# Patient Record
Sex: FEMALE | Race: WHITE | NOT HISPANIC OR LATINO | Employment: FULL TIME | URBAN - METROPOLITAN AREA
[De-identification: names, ages, dates, MRNs, and addresses within clinical notes are randomized per-mention and may not be internally consistent; named-entity substitution may affect disease eponyms.]

---

## 2018-05-03 ENCOUNTER — OFFICE VISIT - HEALTHEAST (OUTPATIENT)
Dept: FAMILY MEDICINE | Facility: CLINIC | Age: 38
End: 2018-05-03

## 2018-05-03 ENCOUNTER — COMMUNICATION - HEALTHEAST (OUTPATIENT)
Dept: TELEHEALTH | Facility: CLINIC | Age: 38
End: 2018-05-03

## 2018-05-03 DIAGNOSIS — R30.0 DYSURIA: ICD-10-CM

## 2018-05-03 DIAGNOSIS — R35.0 INCREASED URINARY FREQUENCY: ICD-10-CM

## 2018-05-03 DIAGNOSIS — N94.10 FEMALE DYSPAREUNIA: ICD-10-CM

## 2018-05-03 DIAGNOSIS — F52.31 FEMALE ORGASMIC DISORDER: ICD-10-CM

## 2018-05-03 ASSESSMENT — MIFFLIN-ST. JEOR: SCORE: 1321.32

## 2018-05-04 ENCOUNTER — COMMUNICATION - HEALTHEAST (OUTPATIENT)
Dept: FAMILY MEDICINE | Facility: CLINIC | Age: 38
End: 2018-05-04

## 2018-05-09 ENCOUNTER — COMMUNICATION - HEALTHEAST (OUTPATIENT)
Dept: FAMILY MEDICINE | Facility: CLINIC | Age: 38
End: 2018-05-09

## 2018-05-15 ENCOUNTER — COMMUNICATION - HEALTHEAST (OUTPATIENT)
Dept: FAMILY MEDICINE | Facility: CLINIC | Age: 38
End: 2018-05-15

## 2018-05-17 ENCOUNTER — COMMUNICATION - HEALTHEAST (OUTPATIENT)
Dept: FAMILY MEDICINE | Facility: CLINIC | Age: 38
End: 2018-05-17

## 2018-06-04 ENCOUNTER — RECORDS - HEALTHEAST (OUTPATIENT)
Dept: ADMINISTRATIVE | Facility: OTHER | Age: 38
End: 2018-06-04

## 2018-06-08 ENCOUNTER — COMMUNICATION - HEALTHEAST (OUTPATIENT)
Dept: FAMILY MEDICINE | Facility: CLINIC | Age: 38
End: 2018-06-08

## 2018-07-13 ENCOUNTER — RECORDS - HEALTHEAST (OUTPATIENT)
Dept: ADMINISTRATIVE | Facility: OTHER | Age: 38
End: 2018-07-13

## 2018-08-29 ENCOUNTER — RECORDS - HEALTHEAST (OUTPATIENT)
Dept: ADMINISTRATIVE | Facility: OTHER | Age: 38
End: 2018-08-29

## 2018-09-05 ENCOUNTER — OFFICE VISIT - HEALTHEAST (OUTPATIENT)
Dept: FAMILY MEDICINE | Facility: CLINIC | Age: 38
End: 2018-09-05

## 2018-09-05 DIAGNOSIS — Z12.4 SCREENING FOR CERVICAL CANCER: ICD-10-CM

## 2018-09-05 DIAGNOSIS — N88.9 CERVIX ABNORMALITY: ICD-10-CM

## 2018-09-05 LAB
CLUE CELLS: NORMAL
TRICHOMONAS, WET PREP: NORMAL
YEAST, WET PREP: NORMAL

## 2018-09-05 ASSESSMENT — MIFFLIN-ST. JEOR: SCORE: 1330.39

## 2018-09-06 LAB
HPV SOURCE: NORMAL
HUMAN PAPILLOMA VIRUS 16 DNA: NEGATIVE
HUMAN PAPILLOMA VIRUS 18 DNA: NEGATIVE
HUMAN PAPILLOMA VIRUS FINAL DIAGNOSIS: NORMAL
HUMAN PAPILLOMA VIRUS OTHER HR: NEGATIVE
SPECIMEN DESCRIPTION: NORMAL

## 2018-09-10 ENCOUNTER — COMMUNICATION - HEALTHEAST (OUTPATIENT)
Dept: FAMILY MEDICINE | Facility: CLINIC | Age: 38
End: 2018-09-10

## 2018-09-12 ENCOUNTER — COMMUNICATION - HEALTHEAST (OUTPATIENT)
Dept: FAMILY MEDICINE | Facility: CLINIC | Age: 38
End: 2018-09-12

## 2018-09-12 ENCOUNTER — COMMUNICATION - HEALTHEAST (OUTPATIENT)
Dept: HEALTH INFORMATION MANAGEMENT | Facility: CLINIC | Age: 38
End: 2018-09-12

## 2018-09-12 ENCOUNTER — COMMUNICATION - HEALTHEAST (OUTPATIENT)
Dept: TELEHEALTH | Facility: CLINIC | Age: 38
End: 2018-09-12

## 2018-09-12 LAB
BKR LAB AP ABNORMAL BLEEDING: NO
BKR LAB AP BIRTH CONTROL/HORMONES: NORMAL
BKR LAB AP CERVICAL APPEARANCE: NORMAL
BKR LAB AP GYN ADEQUACY: NORMAL
BKR LAB AP GYN INTERPRETATION: NORMAL
BKR LAB AP HPV REFLEX: NORMAL
BKR LAB AP LMP: NORMAL
BKR LAB AP PATIENT STATUS: NORMAL
BKR LAB AP PREVIOUS ABNORMAL: NORMAL
BKR LAB AP PREVIOUS NORMAL: 2011
HIGH RISK?: NO
PATH REPORT.COMMENTS IMP SPEC: NORMAL
RESULT FLAG (HE HISTORICAL CONVERSION): NORMAL

## 2018-10-26 ENCOUNTER — OFFICE VISIT - HEALTHEAST (OUTPATIENT)
Dept: FAMILY MEDICINE | Facility: CLINIC | Age: 38
End: 2018-10-26

## 2018-10-26 DIAGNOSIS — M54.50 ACUTE BILATERAL LOW BACK PAIN WITHOUT SCIATICA: ICD-10-CM

## 2018-10-26 DIAGNOSIS — M53.3 SI (SACROILIAC) JOINT DYSFUNCTION: ICD-10-CM

## 2018-10-26 ASSESSMENT — MIFFLIN-ST. JEOR: SCORE: 1325.86

## 2019-02-11 ENCOUNTER — OFFICE VISIT - HEALTHEAST (OUTPATIENT)
Dept: FAMILY MEDICINE | Facility: CLINIC | Age: 39
End: 2019-02-11

## 2019-02-11 DIAGNOSIS — R53.83 LOW ENERGY: ICD-10-CM

## 2019-02-11 DIAGNOSIS — K21.9 GASTROESOPHAGEAL REFLUX DISEASE, ESOPHAGITIS PRESENCE NOT SPECIFIED: ICD-10-CM

## 2019-02-11 ASSESSMENT — MIFFLIN-ST. JEOR: SCORE: 1188.43

## 2019-03-04 ENCOUNTER — OFFICE VISIT - HEALTHEAST (OUTPATIENT)
Dept: FAMILY MEDICINE | Facility: CLINIC | Age: 39
End: 2019-03-04

## 2019-03-04 DIAGNOSIS — R31.0 GROSS HEMATURIA: ICD-10-CM

## 2019-03-04 DIAGNOSIS — Z11.3 ROUTINE SCREENING FOR STI (SEXUALLY TRANSMITTED INFECTION): ICD-10-CM

## 2019-03-04 DIAGNOSIS — Z00.00 HEALTHCARE MAINTENANCE: ICD-10-CM

## 2019-03-04 DIAGNOSIS — F32.81 PMDD (PREMENSTRUAL DYSPHORIC DISORDER): ICD-10-CM

## 2019-03-04 LAB
ALBUMIN SERPL-MCNC: 3.9 G/DL (ref 3.5–5)
ALBUMIN UR-MCNC: NEGATIVE MG/DL
ALP SERPL-CCNC: 37 U/L (ref 45–120)
ALT SERPL W P-5'-P-CCNC: 18 U/L (ref 0–45)
ANION GAP SERPL CALCULATED.3IONS-SCNC: 7 MMOL/L (ref 5–18)
APPEARANCE UR: CLEAR
AST SERPL W P-5'-P-CCNC: 19 U/L (ref 0–40)
BILIRUB SERPL-MCNC: 0.5 MG/DL (ref 0–1)
BILIRUB UR QL STRIP: NEGATIVE
BUN SERPL-MCNC: 8 MG/DL (ref 8–22)
CALCIUM SERPL-MCNC: 9.2 MG/DL (ref 8.5–10.5)
CHLORIDE BLD-SCNC: 104 MMOL/L (ref 98–107)
CHOLEST SERPL-MCNC: 138 MG/DL
CO2 SERPL-SCNC: 27 MMOL/L (ref 22–31)
COLOR UR AUTO: YELLOW
CREAT SERPL-MCNC: 0.77 MG/DL (ref 0.6–1.1)
FASTING STATUS PATIENT QL REPORTED: NO
GFR SERPL CREATININE-BSD FRML MDRD: >60 ML/MIN/1.73M2
GLUCOSE BLD-MCNC: 88 MG/DL (ref 70–125)
GLUCOSE UR STRIP-MCNC: NEGATIVE MG/DL
HDLC SERPL-MCNC: 51 MG/DL
HGB UR QL STRIP: NEGATIVE
HIV 1+2 AB+HIV1 P24 AG SERPL QL IA: NEGATIVE
KETONES UR STRIP-MCNC: NEGATIVE MG/DL
LDLC SERPL CALC-MCNC: 76 MG/DL
LEUKOCYTE ESTERASE UR QL STRIP: NEGATIVE
NITRATE UR QL: NEGATIVE
PH UR STRIP: 7 [PH] (ref 5–8)
POTASSIUM BLD-SCNC: 3.8 MMOL/L (ref 3.5–5)
PROT SERPL-MCNC: 6 G/DL (ref 6–8)
SODIUM SERPL-SCNC: 138 MMOL/L (ref 136–145)
SP GR UR STRIP: 1.01 (ref 1–1.03)
TRIGL SERPL-MCNC: 54 MG/DL
UROBILINOGEN UR STRIP-ACNC: NORMAL

## 2019-03-04 ASSESSMENT — MIFFLIN-ST. JEOR: SCORE: 1193.19

## 2019-03-05 LAB
C TRACH DNA SPEC QL PROBE+SIG AMP: NEGATIVE
N GONORRHOEA DNA SPEC QL NAA+PROBE: NEGATIVE
T PALLIDUM AB SER QL: NEGATIVE

## 2019-03-06 LAB
HAV IGM SERPL QL IA: NEGATIVE
HBV CORE IGM SERPL QL IA: NEGATIVE
HBV SURFACE AG SERPL QL IA: NEGATIVE
HCV AB SERPL QL IA: NEGATIVE

## 2019-05-31 ENCOUNTER — OFFICE VISIT - HEALTHEAST (OUTPATIENT)
Dept: FAMILY MEDICINE | Facility: CLINIC | Age: 39
End: 2019-05-31

## 2019-05-31 DIAGNOSIS — B07.8 OTHER VIRAL WARTS: ICD-10-CM

## 2019-05-31 DIAGNOSIS — F32.81 PMDD (PREMENSTRUAL DYSPHORIC DISORDER): ICD-10-CM

## 2019-05-31 DIAGNOSIS — K12.0 CANKER SORE: ICD-10-CM

## 2019-05-31 DIAGNOSIS — T78.40XA ALLERGIC STATE, INITIAL ENCOUNTER: ICD-10-CM

## 2019-05-31 ASSESSMENT — MIFFLIN-ST. JEOR: SCORE: 1142.04

## 2019-06-18 ENCOUNTER — OFFICE VISIT - HEALTHEAST (OUTPATIENT)
Dept: FAMILY MEDICINE | Facility: CLINIC | Age: 39
End: 2019-06-18

## 2019-06-18 DIAGNOSIS — S46.911A MUSCLE STRAIN OF RIGHT UPPER ARM, INITIAL ENCOUNTER: ICD-10-CM

## 2019-06-18 ASSESSMENT — MIFFLIN-ST. JEOR: SCORE: 1153.5

## 2019-06-21 ENCOUNTER — COMMUNICATION - HEALTHEAST (OUTPATIENT)
Dept: FAMILY MEDICINE | Facility: CLINIC | Age: 39
End: 2019-06-21

## 2019-06-28 ENCOUNTER — COMMUNICATION - HEALTHEAST (OUTPATIENT)
Dept: FAMILY MEDICINE | Facility: CLINIC | Age: 39
End: 2019-06-28

## 2019-07-01 ENCOUNTER — COMMUNICATION - HEALTHEAST (OUTPATIENT)
Dept: FAMILY MEDICINE | Facility: CLINIC | Age: 39
End: 2019-07-01

## 2019-07-03 ENCOUNTER — COMMUNICATION - HEALTHEAST (OUTPATIENT)
Dept: FAMILY MEDICINE | Facility: CLINIC | Age: 39
End: 2019-07-03

## 2019-09-13 ENCOUNTER — OFFICE VISIT - HEALTHEAST (OUTPATIENT)
Dept: FAMILY MEDICINE | Facility: CLINIC | Age: 39
End: 2019-09-13

## 2019-09-13 DIAGNOSIS — G56.00 MILD CARPAL TUNNEL SYNDROME, UNSPECIFIED LATERALITY: ICD-10-CM

## 2019-09-13 DIAGNOSIS — M79.602 PAIN IN BOTH UPPER EXTREMITIES: ICD-10-CM

## 2019-09-13 DIAGNOSIS — M77.12 LATERAL EPICONDYLITIS OF LEFT ELBOW: ICD-10-CM

## 2019-09-13 DIAGNOSIS — M79.601 PAIN IN BOTH UPPER EXTREMITIES: ICD-10-CM

## 2019-09-13 ASSESSMENT — MIFFLIN-ST. JEOR: SCORE: 1144.42

## 2019-10-15 ENCOUNTER — OFFICE VISIT - HEALTHEAST (OUTPATIENT)
Dept: FAMILY MEDICINE | Facility: CLINIC | Age: 39
End: 2019-10-15

## 2019-10-15 DIAGNOSIS — J40 BRONCHITIS: ICD-10-CM

## 2019-10-15 ASSESSMENT — MIFFLIN-ST. JEOR: SCORE: 1162.57

## 2019-10-30 ENCOUNTER — COMMUNICATION - HEALTHEAST (OUTPATIENT)
Dept: FAMILY MEDICINE | Facility: CLINIC | Age: 39
End: 2019-10-30

## 2019-11-26 ENCOUNTER — COMMUNICATION - HEALTHEAST (OUTPATIENT)
Dept: FAMILY MEDICINE | Facility: CLINIC | Age: 39
End: 2019-11-26

## 2019-12-09 ENCOUNTER — OFFICE VISIT - HEALTHEAST (OUTPATIENT)
Dept: FAMILY MEDICINE | Facility: CLINIC | Age: 39
End: 2019-12-09

## 2019-12-09 DIAGNOSIS — N94.9 FEMALE GENITAL SYMPTOMS: ICD-10-CM

## 2019-12-09 DIAGNOSIS — N93.8 DYSFUNCTIONAL UTERINE BLEEDING: ICD-10-CM

## 2019-12-09 LAB — HGB BLD-MCNC: 12.8 G/DL (ref 12–16)

## 2019-12-09 ASSESSMENT — MIFFLIN-ST. JEOR: SCORE: 1180.71

## 2019-12-18 ENCOUNTER — COMMUNICATION - HEALTHEAST (OUTPATIENT)
Dept: FAMILY MEDICINE | Facility: CLINIC | Age: 39
End: 2019-12-18

## 2020-02-07 ENCOUNTER — COMMUNICATION - HEALTHEAST (OUTPATIENT)
Dept: FAMILY MEDICINE | Facility: CLINIC | Age: 40
End: 2020-02-07

## 2020-02-07 DIAGNOSIS — N93.8 DYSFUNCTIONAL UTERINE BLEEDING: ICD-10-CM

## 2020-02-07 DIAGNOSIS — N94.9 FEMALE GENITAL SYMPTOMS: ICD-10-CM

## 2020-02-13 ENCOUNTER — COMMUNICATION - HEALTHEAST (OUTPATIENT)
Dept: FAMILY MEDICINE | Facility: CLINIC | Age: 40
End: 2020-02-13

## 2020-02-24 ENCOUNTER — RECORDS - HEALTHEAST (OUTPATIENT)
Dept: ADMINISTRATIVE | Facility: OTHER | Age: 40
End: 2020-02-24

## 2020-03-04 ENCOUNTER — RECORDS - HEALTHEAST (OUTPATIENT)
Dept: ADMINISTRATIVE | Facility: OTHER | Age: 40
End: 2020-03-04

## 2020-05-04 ENCOUNTER — AMBULATORY - HEALTHEAST (OUTPATIENT)
Dept: SURGERY | Facility: CLINIC | Age: 40
End: 2020-05-04

## 2020-05-04 DIAGNOSIS — Z11.59 ENCOUNTER FOR SCREENING FOR OTHER VIRAL DISEASES: ICD-10-CM

## 2020-08-04 ENCOUNTER — AMBULATORY - HEALTHEAST (OUTPATIENT)
Dept: SURGERY | Facility: HOSPITAL | Age: 40
End: 2020-08-04

## 2020-08-04 DIAGNOSIS — Z11.59 ENCOUNTER FOR SCREENING FOR OTHER VIRAL DISEASES: ICD-10-CM

## 2020-08-17 ENCOUNTER — OFFICE VISIT - HEALTHEAST (OUTPATIENT)
Dept: FAMILY MEDICINE | Facility: CLINIC | Age: 40
End: 2020-08-17

## 2020-08-17 ENCOUNTER — COMMUNICATION - HEALTHEAST (OUTPATIENT)
Dept: FAMILY MEDICINE | Facility: CLINIC | Age: 40
End: 2020-08-17

## 2020-08-17 ENCOUNTER — AMBULATORY - HEALTHEAST (OUTPATIENT)
Dept: FAMILY MEDICINE | Facility: CLINIC | Age: 40
End: 2020-08-17

## 2020-08-17 DIAGNOSIS — Z01.818 PREOP GENERAL PHYSICAL EXAM: ICD-10-CM

## 2020-08-17 DIAGNOSIS — Z11.59 ENCOUNTER FOR SCREENING FOR OTHER VIRAL DISEASES: ICD-10-CM

## 2020-08-17 DIAGNOSIS — N94.9 FEMALE GENITAL SYMPTOMS: ICD-10-CM

## 2020-08-17 LAB
ALBUMIN SERPL-MCNC: 4.1 G/DL (ref 3.5–5)
ALBUMIN UR-MCNC: NEGATIVE MG/DL
ALP SERPL-CCNC: 37 U/L (ref 45–120)
ALT SERPL W P-5'-P-CCNC: 12 U/L (ref 0–45)
ANION GAP SERPL CALCULATED.3IONS-SCNC: 8 MMOL/L (ref 5–18)
APPEARANCE UR: CLEAR
AST SERPL W P-5'-P-CCNC: 14 U/L (ref 0–40)
BILIRUB SERPL-MCNC: 0.7 MG/DL (ref 0–1)
BILIRUB UR QL STRIP: NEGATIVE
BUN SERPL-MCNC: 11 MG/DL (ref 8–22)
CALCIUM SERPL-MCNC: 9.4 MG/DL (ref 8.5–10.5)
CHLORIDE BLD-SCNC: 103 MMOL/L (ref 98–107)
CO2 SERPL-SCNC: 26 MMOL/L (ref 22–31)
COLOR UR AUTO: YELLOW
CREAT SERPL-MCNC: 0.81 MG/DL (ref 0.6–1.1)
GFR SERPL CREATININE-BSD FRML MDRD: >60 ML/MIN/1.73M2
GLUCOSE BLD-MCNC: 84 MG/DL (ref 70–125)
GLUCOSE UR STRIP-MCNC: NEGATIVE MG/DL
HCG UR QL: NEGATIVE
HGB BLD-MCNC: 13.9 G/DL (ref 12–16)
HGB UR QL STRIP: NEGATIVE
KETONES UR STRIP-MCNC: NEGATIVE MG/DL
LEUKOCYTE ESTERASE UR QL STRIP: NEGATIVE
NITRATE UR QL: NEGATIVE
PH UR STRIP: 7.5 [PH] (ref 5–8)
POTASSIUM BLD-SCNC: 3.9 MMOL/L (ref 3.5–5)
PROT SERPL-MCNC: 6.1 G/DL (ref 6–8)
SODIUM SERPL-SCNC: 137 MMOL/L (ref 136–145)
SP GR UR STRIP: 1.01 (ref 1–1.03)
UROBILINOGEN UR STRIP-ACNC: NORMAL

## 2020-08-19 ENCOUNTER — COMMUNICATION - HEALTHEAST (OUTPATIENT)
Dept: SCHEDULING | Facility: CLINIC | Age: 40
End: 2020-08-19

## 2020-08-20 ENCOUNTER — SURGERY - HEALTHEAST (OUTPATIENT)
Dept: SURGERY | Facility: HOSPITAL | Age: 40
End: 2020-08-20

## 2020-08-20 ENCOUNTER — ANESTHESIA - HEALTHEAST (OUTPATIENT)
Dept: SURGERY | Facility: HOSPITAL | Age: 40
End: 2020-08-20

## 2020-08-23 ENCOUNTER — COMMUNICATION - HEALTHEAST (OUTPATIENT)
Dept: SCHEDULING | Facility: CLINIC | Age: 40
End: 2020-08-23

## 2021-01-08 ENCOUNTER — OFFICE VISIT - HEALTHEAST (OUTPATIENT)
Dept: FAMILY MEDICINE | Facility: CLINIC | Age: 41
End: 2021-01-08

## 2021-01-08 DIAGNOSIS — G56.02 CARPAL TUNNEL SYNDROME OF LEFT WRIST: ICD-10-CM

## 2021-01-08 DIAGNOSIS — D18.03 HEMANGIOMA OF LIVER: ICD-10-CM

## 2021-01-08 DIAGNOSIS — R42 LIGHTHEADEDNESS: ICD-10-CM

## 2021-01-08 RX ORDER — ALBUTEROL SULFATE 90 UG/1
2 AEROSOL, METERED RESPIRATORY (INHALATION)
Status: SHIPPED | COMMUNITY
Start: 2020-10-25 | End: 2022-04-20

## 2021-01-23 ENCOUNTER — COMMUNICATION - HEALTHEAST (OUTPATIENT)
Dept: FAMILY MEDICINE | Facility: CLINIC | Age: 41
End: 2021-01-23

## 2021-05-28 ENCOUNTER — RECORDS - HEALTHEAST (OUTPATIENT)
Dept: ADMINISTRATIVE | Facility: CLINIC | Age: 41
End: 2021-05-28

## 2021-05-29 NOTE — PROGRESS NOTES
Assessment/Plan:     Presents to clinic to follow-up on several issues.    1. Canker sore  Patient describes lesions in the roof of her mouth and back of her tongue that resemble canker sores.  I recommended that she discontinue use of sodium lauryl sulfate in her toothpaste and to decrease acidic foods.  Handout was provided to patient on treatment options at home.    2. Allergic state, initial encounter  We discussed that allergy testing would be unlikely to benefit the patient as she would be unable to control her environment.  We recommended Claritin or Flonase as needed for allergies.    3. Other viral warts  Single lesion on patient's thumb treated x5.  - Cryotherapy, skin lesion; Future    4. PMDD (premenstrual dysphoric disorder)  Patient and I had a long discussion about continuation of Prozac.  I offered an increase, she would like to wait 3 months to determine if that is necessary.  No changes to medication today.      Return to clinic in 3 months.    Total time spent with patient was 25 minutes with greater than 50% spent in face-to-face counseling regarding the above plan.    Subjective:      Orquidea Mckeon is a 38 y.o. female who presents to clinic for several concerns.    Patient states she felt she had a nervous breakdown a few weeks ago. She had so much bad news in a short period of time that she felt very overwhelmed. She took good care of herself during this time and now thinks things are getting better.  She wonders if she should stop the Prozac.  She does believe it is helping and has had comments from close friends/family saying that she appears improved on this medication.    Patient has been experiencing a small lesion/bump on her index finger of the right hand which she wonders if it might be a wart.    In addition, patient experiences recurrent lesions in mouth frequently. It comes and goes in spurts.     Patient endorses allergies.  She wonders if it is related to her canker sores.   "She is not taking any medication for this.  She wonders if allergy testing is reasonable.      Past Medical History, Family History, and Social History reviewed.     Current Outpatient Medications on File Prior to Visit   Medication Sig Dispense Refill     FLUoxetine (PROZAC) 10 MG capsule Take 1 capsule (10 mg total) by mouth daily. 90 capsule 3     No current facility-administered medications on file prior to visit.        Review of systems is as stated in HPI.  Endorses: allergy symptoms, cold intolerance and increased thirst  The remainder of the 10 system review is otherwise negative.    Objective:     /60   Pulse 85   Ht 5' 0.05\" (1.525 m)   Wt 122 lb 4.8 oz (55.5 kg)   SpO2 98%   BMI 23.85 kg/m   Body mass index is 23.85 kg/m .    Gen: Alert, NAD, appears stated age, normal hygiene   Psych: no apparent hallucinations or delusions, no pressured speech; alert, oriented x3  SKIN: Very small, 2 mm diameter verrucous lesion present on patient's thumb    This note has been dictated using voice recognition software. Any grammatical or context distortions are unintentional and inherent to the the software.     Chantel Segovia MD      "

## 2021-05-29 NOTE — PROGRESS NOTES
Assessment/Plan:     Patient presents to clinic's 2 days status post cleaning her pool with what appears to be a strain of the right upper arm.  She has symptoms located at the shoulder joint, the elbow joint, and at the wrist extending into the fingers.  There are no clinically concerning physical exam findings, no signs of infection, no obvious nerve compromise.  Would recommend rest, sparing use of NSAIDs, and gentle stretching.  A note was composed for work in order for patient to limit exacerbation.  Anticipate recovery within the next several days to weeks.    Problem List Items Addressed This Visit     None      Visit Diagnoses     Muscle strain of right upper arm, initial encounter    -  Primary          Return to clinic for physical.     Total time spent with patient was 15 minutes with greater than 50% spent in face-to-face counseling regarding the above plan.    Subjective:      Orquidea Mckeon is a 38 y.o. female who presents to clinic for right hand swelling and pain.    Patient first noticed the pain on Sunday, 2 days ago. It started in the hand but now it has progressed up the arm. It is located on the ventral and dorsal surfaces of the forearm but not the lateral aspects. She denies inciting injury. It is now swollen which has her concerned. It has worsened in that it has spread but also severity has increased up 6/10. It is worse when she does . Her shoulder also hurts. Today is the worst day so far. She has taken ibuprofen for it and it 'takes the edge off' but it doesn't resolve the pain. She has not had anything like this before. She has some numbness in the 5th and 4th digits.     Upon further probing, patient endorses that she cleaned her pool for the first time on Sunday.  This involves numerous repetitive atypical movements for the patient.  She is significantly right-handed and did not change positions during the cleaning.    Past Medical History, Family History, and Social  History reviewed.     Current Outpatient Medications on File Prior to Visit   Medication Sig Dispense Refill     FLUoxetine (PROZAC) 10 MG capsule Take 1 capsule (10 mg total) by mouth daily. 90 capsule 3     No current facility-administered medications on file prior to visit.        Review of systems is as stated in HPI.  The remainder of the 10 system review is otherwise negative.    Objective:     /70   Pulse 62   Ht 5' (1.524 m)   Wt 125 lb (56.7 kg)   SpO2 98%   BMI 24.41 kg/m   Body mass index is 24.41 kg/m .    Gen: Alert, NAD, appears stated age, normal hygiene   MSK: grossly full range of motion in all joints, no obvious deformity; possible slight weakness at both the shoulder joint and the  strength on the right, but it would be subtle.  Full range of motion of all joints, no obvious deformity.  No obvious edema or erythema of the limb.  No significant tenderness to palpation.    This note has been dictated using voice recognition software. Any grammatical or context distortions are unintentional and inherent to the the software.     Chantel Segovia MD

## 2021-05-30 NOTE — TELEPHONE ENCOUNTER
Pt. Came into the clinic and needs FMLA forms filled out for her work.  When complete if we can fax it and then call her so she knows that they got sent.  Forms placed in Luca's bin.    Thanks.

## 2021-06-01 VITALS — HEIGHT: 68 IN | WEIGHT: 136 LBS | BODY MASS INDEX: 20.61 KG/M2

## 2021-06-01 VITALS — WEIGHT: 134 LBS | HEIGHT: 68 IN | BODY MASS INDEX: 20.31 KG/M2

## 2021-06-01 NOTE — PROGRESS NOTES
Assessment/Plan:     Patient presents to clinic with ongoing waxing and waning discomfort in bilateral upper extremities is worse than left than the right.  I suspect that given that every joint is affected, patient's sleeping or work environment are likely contributory.  I recommended an ergonomic assessment at work and that patient sleep with a pillow to help support the top shoulder if she is sleeping on her side.    Her physical exam is fairly benign.  She does have a positive Tinel and Phalen, and some mild tenderness to palpation just distal to the left elbow.  She has full range of motion and preserved strength.  I have no suspicion of a rotator cuff injury.    We discussed that next steps would be to resume bracing her wrists at night, consider a band to apply pressure for the left elbow discomfort, and that she should return if she is interested in physical therapy, or an EMG.    Problem List Items Addressed This Visit     None      Visit Diagnoses     Pain in both upper extremities    -  Primary    Lateral epicondylitis of left elbow        Mild carpal tunnel syndrome, unspecified laterality              Return to clinic in 1 to 2 months if not improving.    Total time spent with patient was 15 minutes with greater than 50% spent in face-to-face counseling regarding the above plan.    Subjective:      Orquidea Mckeon is a 39 y.o. female who presents to clinic for pain in bilateral upper extremities.    Patient has been having intermittent, waxing and waning, bilateral upper extremity discomfort for several months.  She states it was particularly bad when she was last seen here, earlier in the summer.  However, it then gradually improved.  However, last week, it returned significantly.  It is worse on the left than the right, patient is right-handed.  Patient has had an ergonomic evaluation of her workstation, but she does type all day.  She has an ergonomic keyboard now, but has not had her station  reevaluated for her posture and support for her wrists and upper extremities.    Patient is also a side sleeper.  She sleeps on both sides, but often will sleep on the right side.  She uses one pillow.  She does not support her shoulder.  She does wake up in the middle the night and flex her wrist.  She has used braces in the past during the day for her hand and wrist pain.      Past Medical History, Family History, and Social History reviewed.     Current Outpatient Medications on File Prior to Visit   Medication Sig Dispense Refill     FLUoxetine (PROZAC) 10 MG capsule Take 1 capsule (10 mg total) by mouth daily. 90 capsule 3     No current facility-administered medications on file prior to visit.        Review of systems is as stated in HPI.  The remainder of the 10 system review is otherwise negative.    Objective:     /70   Pulse 70   Ht 5' (1.524 m)   Wt 123 lb (55.8 kg)   SpO2 98%   BMI 24.02 kg/m   Body mass index is 24.02 kg/m .    Gen: Alert, NAD, appears stated age, normal hygiene   MSK: no obvious deformity, preserved strength bilaterally at both proximal and distal upper extremities, positive Tinel and Phalen on the left, endorsed tenderness to palpation just distal to the elbow on the left, full range of motion in joints      This note has been dictated using voice recognition software. Any grammatical or context distortions are unintentional and inherent to the the software.     Chantel Segovia MD

## 2021-06-02 VITALS — WEIGHT: 135 LBS | BODY MASS INDEX: 20.46 KG/M2 | HEIGHT: 68 IN

## 2021-06-02 VITALS — BODY MASS INDEX: 24.92 KG/M2 | WEIGHT: 132 LBS | HEIGHT: 61 IN

## 2021-06-02 VITALS — BODY MASS INDEX: 24.39 KG/M2 | WEIGHT: 129.2 LBS | HEIGHT: 61 IN

## 2021-06-02 NOTE — PATIENT INSTRUCTIONS - HE
Consider Sucrets (numbing, lozenge)  Guaifenesin: mucinex, robitussin, dayquil/nyquil whatever  Tessalon perles; ok to stack with anything else    TREATMENT OF SORE THROAT  Sore throat treatment: Antibiotics do not help throat pain caused by a virus and are not recommended as they only for bacterial infections. Most sore throats are caused by viruses. Inappropriate use of antibiotics for viral illness can unnecessarily expose patients to side effects like diarrhea, rash, or more serious allergic reactions. Sore throat caused by viral infections usually lasts four to five days. During this time, treatments to reduce pain may be helpful. Several therapies can help to relieve throat pain.  Pain medication -- Over the counter pain relievers such as acetaminophen (Tylenol ) or a non-steroidal anti-inflammatory agent such as ibuprofen or naproxen (Motrin  or Aleve ) have been shown to provide fast and effective relief of sore throat pain.  Oral rinses -- Salt-water gargles are an old stand-by for throat pain. It is not clear that salt water works to relieve pain, but it is unlikely to be harmful. Most recipes suggest 1/4 to 1/2 teaspoon (1.5 to 3.0 g) of salt per one cup (8 ounces or 250 mL) of warm water.   Sprays -- Sprays containing topical anesthetics (eg, benzocaine, phenol) are available to treat sore throat. However, such sprays are no more effective than sucking on hard candy and it is important not to swallow the liquid.  Lozenges -- A variety of lozenges (cough drops) containing topical anesthetics are available to treat throat pain or relieve dryness Lozenges may persist longer in the throat than sprays or gargles and thus, may be more effective for symptom relief [2].  Other treatments -- Other treatments that may help with throat pain include sipping warm beverages (eg, honey or lemon tea, chicken soup), cold beverages, or eating cold or frozen desserts (eg, ice cream, popsicles).  Honey - Honey is a natural  mucolytic and can make sore throats with phlegm feel better. Many patients like to mix it with lemon or lemon tea.    Dr. Pablo's recipe:  - 1 tablespoon lemon juice  - 3-4 tablespoons water  - honey (about a teaspoon but marifer to taste)  Heat in a small glass and try before bedtime. Humera with the recipe to your taste preference.

## 2021-06-02 NOTE — PROGRESS NOTES
Assessment/Plan:     Since to clinic with symptoms consistent with bronchitis.  Recommended symptomatic treatment of guaifenesin, lozenges, and Tessalon Perles.  Patient was given a handout on symptomatic treatment for sore throats.  She was encouraged to have good hydration.    Problem List Items Addressed This Visit     None      Visit Diagnoses     Bronchitis    -  Primary    Relevant Medications    benzonatate (TESSALON PERLES) 100 MG capsule          Return to clinic on Friday, to discuss hand concerns.    Total time spent with patient was 10 minutes with greater than 50% spent in face-to-face counseling regarding the above plan.    Subjective:      Orquidea Mckeon is a 39 y.o. female who presents to clinic for deep cough.    Patient has been experiencing symptoms for 13 days. It is sometimes improving and sometimes worsening.  Patient endorses: congestion, post-nasal drip, itchy throat, fatigue, muscle aches vs meeta horses, and dry cough, slight shortness of breath, chest pressure, ear pressure  Patient denies: chest pain, fever, ear pain, ear discharge, rash, rhinorrhea, sinus pressure  Treatment thus far has consisted of ibuprofen and chloraseptic.  Sick contacts include .  Recent travel to [place].    Past Medical History, Family History, and Social History reviewed.     Current Outpatient Medications on File Prior to Visit   Medication Sig Dispense Refill     FLUoxetine (PROZAC) 10 MG capsule Take 1 capsule (10 mg total) by mouth daily. 90 capsule 3     No current facility-administered medications on file prior to visit.        Review of systems is as stated in HPI.  Endorses: weakness, spots in vision, hoarseness, chest pain, leg cramps, joint pain  The remainder of the 10 system review is otherwise negative.    Objective:     BP 96/70   Temp 98.8  F (37.1  C)   Ht 5' (1.524 m)   Wt 127 lb (57.6 kg)   BMI 24.80 kg/m   Body mass index is 24.8 kg/m .    Gen: Alert, NAD, appears stated age,  normal hygiene   Eyes: conjunctivae without injection, sclera clear, EOMI  ENT/mouth: nares clear, septum midline, absent rhinorrhea, trace pharyngeal injection, neck is supple  CV: RRR, no murmur appreciated, pedal edema absent bilaterally  Resp: CTAB, no wheezes, rales or ronchi  ABD: non-tender to palpation, nondistended  MSK: grossly full range of motion in all joints, no obvious deformity  Neuro: CN II-XII grossly intact, no deficits in coordination  Psych: no apparent hallucinations or delusions, no pressured speech; alert, oriented x3  SKIN: dry and without lesions  Heme/lymph: no pallor, no active bleeding/bruising, no adenopathy appreciated    This note has been dictated using voice recognition software. Any grammatical or context distortions are unintentional and inherent to the the software.     Chantel Segovia MD

## 2021-06-03 VITALS
HEIGHT: 60 IN | OXYGEN SATURATION: 98 % | DIASTOLIC BLOOD PRESSURE: 70 MMHG | HEART RATE: 70 BPM | SYSTOLIC BLOOD PRESSURE: 102 MMHG | BODY MASS INDEX: 24.15 KG/M2 | WEIGHT: 123 LBS

## 2021-06-03 VITALS — BODY MASS INDEX: 24.54 KG/M2 | WEIGHT: 125 LBS | HEIGHT: 60 IN

## 2021-06-03 VITALS
WEIGHT: 127 LBS | HEIGHT: 60 IN | TEMPERATURE: 98.8 F | BODY MASS INDEX: 24.94 KG/M2 | DIASTOLIC BLOOD PRESSURE: 70 MMHG | SYSTOLIC BLOOD PRESSURE: 96 MMHG

## 2021-06-03 VITALS — BODY MASS INDEX: 24.01 KG/M2 | WEIGHT: 122.3 LBS | HEIGHT: 60 IN

## 2021-06-04 VITALS — WEIGHT: 126 LBS | BODY MASS INDEX: 24.61 KG/M2

## 2021-06-04 VITALS
DIASTOLIC BLOOD PRESSURE: 78 MMHG | OXYGEN SATURATION: 99 % | BODY MASS INDEX: 24.61 KG/M2 | HEART RATE: 82 BPM | SYSTOLIC BLOOD PRESSURE: 100 MMHG | WEIGHT: 126 LBS

## 2021-06-04 VITALS
WEIGHT: 131 LBS | HEIGHT: 60 IN | DIASTOLIC BLOOD PRESSURE: 74 MMHG | SYSTOLIC BLOOD PRESSURE: 102 MMHG | OXYGEN SATURATION: 98 % | HEART RATE: 69 BPM | BODY MASS INDEX: 25.72 KG/M2

## 2021-06-04 NOTE — PROGRESS NOTES
"  Assessment/Plan:     Patient presents to clinic with a distant history of pelvic pain and suspected endometriosis, and a new onset worsening dysfunctional uterine bleeding with known fibroids.  I suspect the patient would benefit from an ablation versus a hysterectomy.  I referred patient to gynecology for evaluation of options.  Although I do not suspect acute anemia, a hemoglobin was obtained for completion.    Problem List Items Addressed This Visit     Pelvic Pain - Primary    Relevant Orders    Ambulatory referral to Gynecology      Other Visit Diagnoses     Dysfunctional uterine bleeding        Relevant Orders    Ambulatory referral to Gynecology    Hemoglobin          Return to clinic PRN.    Subjective:      Orquidea Mckeon is a 39 y.o. female who presents to clinic for dysfunctional uterine bleeding.    Patient's last menstrual cycle was the worst she has had.  She describes a cyst or a polyp, but when asked to describe in detail she describes a \"gummy-like\" clot.  Bleeding was light the first week, and heavy the second week.  And lasted from approximately November 18 until November 20.  She is still having periods once monthly, fairly regularly, but the cycles themselves have become more and more dysfunctional.  She also has a history of ongoing pelvic pain.  It has not been thickened factor in some time.    Past Medical History, Family History, and Social History reviewed.  Patient does have a history of endometritis and small fibroids.    Current Outpatient Medications on File Prior to Visit   Medication Sig Dispense Refill     benzonatate (TESSALON PERLES) 100 MG capsule Take 1 capsule (100 mg total) by mouth every 6 (six) hours as needed for cough. 30 capsule 0     FLUoxetine (PROZAC) 10 MG capsule Take 1 capsule (10 mg total) by mouth daily. 90 capsule 3     No current facility-administered medications on file prior to visit.        Review of systems is as stated in HPI.  Endorses: lightheadedness " and throat concerns  The remainder of the 10 system review is otherwise negative.    Objective:     /74   Pulse 69   Ht 5' (1.524 m)   Wt 131 lb (59.4 kg)   SpO2 98%   BMI 25.58 kg/m   Body mass index is 25.58 kg/m .    Gen: Alert, NAD, appears stated age, normal hygiene   Psych: no apparent hallucinations or delusions, no pressured speech; alert, oriented x3  SKIN: dry and without lesions  Heme/lymph: no pallor    This note has been dictated using voice recognition software. Any grammatical or context distortions are unintentional and inherent to the the software.     Chantel Segovia MD

## 2021-06-05 VITALS
HEART RATE: 69 BPM | WEIGHT: 119 LBS | BODY MASS INDEX: 22.48 KG/M2 | SYSTOLIC BLOOD PRESSURE: 118 MMHG | OXYGEN SATURATION: 98 % | DIASTOLIC BLOOD PRESSURE: 80 MMHG

## 2021-06-10 NOTE — PROGRESS NOTES
[facility]  [address]  [place] [address]  Dept: [phone]  Dept Fax: [phone]  Primary Provider: Chantel Carrillo MD  Pre-op Performing Provider: CHANTEL CARRILLO    PREOPERATIVE EVALUATION:  Today's date: 8/17/2020    Orquidea Mckeon is a 39 y.o. female who presents for a preoperative evaluation.    Surgical Information:  No flowsheet data found.  Fax number for surgical facility: Note does not need to be faxed, will be available electronically in Epic.  Type of Anesthesia Anticipated: unknown at this time    Subjective     HPI related to upcoming procedure: Patient has been having pelvic pain for about 3 years. It has worsened this past month. It goes in waves. The pain is aching. It radiates up the abdomen and back. Patient has tried contraception, physical therapy, constipation medications and analgesics. Nothing has worked sufficiently. She has had numerous referrals.    No flowsheet data found.        Patient does not have a Health Care Directive or Living Will: Discussed advance care planning with patient; information given to patient to review.    [place] reviewed:  reviewed - no concerns    none    Review of Systems  10 point negative except for leg cramps    Patient Active Problem List    Diagnosis Date Noted     PMDD (premenstrual dysphoric disorder) 03/04/2019     Gastroesophageal reflux disease, esophagitis presence not specified 02/11/2019     Hemangioma of liver 09/18/2014     Herpes Simplex Type I      Eczema      Pelvic Pain      Past Medical History:   Diagnosis Date     Hemangioma     location: liver     Past Surgical History:   Procedure Laterality Date     BREAST SURGERY       MT APPENDECTOMY      Description: Appendectomy;  Recorded: 11/26/2008;     MT LIGATE FALLOPIAN TUBE      Description: Tubal Ligation;  Recorded: 06/27/2013;  Comments: for sterilization on 7/09     No current outpatient medications on file.     No current  facility-administered medications for this visit.        Allergies   Allergen Reactions     Other Allergy (See Comments)      [facility] [employer] [facility], 2008.         Social History     Tobacco Use     Smoking status: Former Smoker     Last attempt to quit: 3/4/2006     Years since quittin.4     Smokeless tobacco: Never Used   Substance Use Topics     Alcohol use: Yes     Alcohol/week: 1.0 standard drinks     Types: 1 Glasses of wine per week      Family History   Problem Relation Age of Onset     Hemangiomas Sister      Hemangiomas Brother      Breast cancer Maternal Grandmother      Kidney disease Maternal Grandfather      Alcohol abuse Maternal Grandfather      Social History     Substance and Sexual Activity   Drug Use No           Objective   /78   Pulse 82   Wt 126 lb (57.2 kg)   SpO2 99%   BMI 24.61 kg/m    Physical Exam  Gen: Alert, NAD, appears stated age, normal hygiene   Eyes: conjunctivae without injection, sclera clear, EOMI  ENT/mouth: nares clear, septum midline, absent rhinorrhea, throat without injection, neck is supple, no thyroid enlargement  CV: RRR, no murmur appreciated, pedal edema absent bilaterally  Resp: CTAB, no wheezes, rales or ronchi  ABD: normoactive, non-tender to palpation, nondistended  MSK: grossly full range of motion in all joints, no obvious deformity  Neuro: CN II-XII grossly intact, no deficits in coordination  Psych: no apparent hallucinations or delusions, no pressured speech; alert, oriented x3  SKIN: dry and without lesions  Heme/lymph: no pallor, no active bleeding/bruising, no adenopathy appreciated      Recent Labs   Lab Test 19  1608 19  1400   HGB 12.8  --    NA  --  138   K  --  3.8   CREATININE  --  0.77        PRE-OP Diagnostics:   Labs pending at this time. Results will be reviewed when available.  No EKG required for low risk surgery (cataract, skin procedure, breast biopsy,  etc).  No EKG required, no history of coronary heart disease, significant arrhythmia, peripheral arterial disease or other structural heart disease.       Assessment & Plan       The proposed surgical procedure is considered LOW risk.    REVISED CARDIAC RISK INDEX   The patient has the following serious cardiovascular risks for perioperative complications:  No serious cardiac risks = 0 points    INTERPRETATION: 0 points: Class I (very low risk - 0.4% complication rate)      ICD-10-CM    1. Preop general physical exam  Z01.818 Comprehensive Metabolic Panel     Hemoglobin     Urinalysis-UC if Indicated     Pregnancy, Urine   2. Pelvic Pain  N94.9        The patient has the following additional risks and recommendations for perioperative complications:     - No identified additional risk factors other than previously addressed     MEDICATION INSTRUCTIONS:  Patient is on no chronic medications    RECOMMENDATION:  APPROVAL GIVEN to proceed with proposed procedure, without further diagnostic evaluation.    No follow-ups on file.    Signed Electronically by: Chantel Segovia MD    Copy of this evaluation report is provided to requesting physician.

## 2021-06-10 NOTE — ANESTHESIA PROCEDURE NOTES
Peripheral Block    Patient location during procedure: OR  Start time: 8/20/2020 9:34 AM  End time: 8/20/2020 9:38 AM  post-op analgesia per surgeon order as noted in medical record  Staffing:  Performing  Anesthesiologist: Rula Garcia MD  Preanesthetic Checklist  Completed: patient identified, risks, benefits, and alternatives discussed, timeout performed, consent obtained, at patient's request, airway assessed, oxygen available, suction available, emergency drugs available and hand hygiene performed  Peripheral Block  Block type: other, TAP  Prep: ChloraPrep  Patient position: supine  Patient monitoring: cardiac monitor, continuous pulse oximetry, heart rate and blood pressure  Laterality: bilateral, same technique used bilaterally  Injection technique: ultrasound guided    Ultrasound used to visualize needle placement in proximity to nerve being blocked: yes   US used to visualize anesthetic spread  Visualized anatomic structures normal  No Pathological Findings  Permanent ultrasound image captured for medical record  Sterile gel and probe cover used for ultrasound.  Needle  Needle type: Stimuplex   Needle gauge: 20G  Needle length: 4 in  no peripheral nerve catheter placed  Assessment  Injection assessment: no difficulty with injection, negative aspiration for heme, no paresthesia on injection and incremental injection

## 2021-06-10 NOTE — ANESTHESIA PREPROCEDURE EVALUATION
Anesthesia Evaluation      Patient summary reviewed   No history of anesthetic complications     Airway   Mallampati: II  Neck ROM: full   Pulmonary     breath sounds clear to auscultation  (+) a smoker (remote history, quit in 2006)  (-) asthma, sleep apnea                         Cardiovascular   (-) hypertension  Rhythm: regular  Rate: normal,         Neuro/Psych    (+) chronic pain  (-) no seizures, no CVA    Endo/Other    (-) no diabetes, no obesity, not pregnant     GI/Hepatic/Renal    (+) GERD well controlled,        Other findings:   Covid negative 8/17    Labs 8/20  Hbg 13.2  + ANTIBODIES IN BLOOD     Labs 8/17  Hbg 13.9  Na 137  K 3.9  BUN/Cr 11/0.81      Dental - normal exam                        Anesthesia Plan  Planned anesthetic: general endotracheal  Acetaminophen 1 g (pre-op), ketamine 25 mg on induction, ketorolac 15 mg if ok w/ surgeon  Bilateral TAP blocks post-induction  Propofol gtt background   Dexamethasone 10 mg, ondansetron 4 mg  ASA 2   Induction: intravenous   Anesthetic plan and risks discussed with: patient  Anesthesia plan special considerations: antiemetics,   Post-op plan: routine recovery

## 2021-06-10 NOTE — TELEPHONE ENCOUNTER
Orquidea had surgery Thursday, hysterectomy. Feels fine with abdomen, manageable. Is wondering about lungs and breathing tube, is swallowing more. Not short of breath, not discomfort but different than usual. Temp 99 yesterday, no fever today. Discussed fluids, taking deep breaths, moving about. If any worsening or persisting symptoms be seen. Certainly if any shortness of breath even very subtle develops or fever develops be seen. She verbalized understanding.     Additional Information    Negative: Sounds like a life-threatening emergency to the triager    Negative: Chest pain    Negative: Difficulty breathing    Negative: Surgical incision symptoms and questions    Negative: [1] Discomfort (pain, burning or stinging) when passing urine AND [2] male    Negative: [1] Discomfort (pain, burning or stinging) when passing urine AND [2] female    Negative: Constipation    Negative: New or worsening leg (calf, thigh) pain    Negative: New or worsening leg swelling    Negative: Dizziness is severe, or persists > 24 hours after surgery    Negative: Pain, redness, swelling, or pus at IV Site    Negative: Symptoms arising from use of a urinary catheter (Valles or Coude)    Negative: Cast problems or questions    Negative: Medication question    Negative: [1] Widespread rash AND [2] bright red, sunburn-like    Negative: [1] SEVERE headache AND [2] after spinal (epidural) anesthesia    Negative: [1] Vomiting AND [2] persists > 4 hours    Negative: [1] Vomiting AND [2] abdomen looks much more swollen than usual    Negative: [1] Drinking very little AND [2] dehydration suspected (e.g., no urine > 12 hours, very dry mouth, very lightheaded)    Negative: Patient sounds very sick or weak to the triager    Negative: Sounds like a serious complication to the triager    Negative: Fever > 100.4 F (38.0 C)    Negative: [1] SEVERE post-op pain (e.g., excruciating, pain scale 8-10) AND [2] not controlled with pain medications    Negative:  [1] Caller has URGENT question AND [2] triager unable to answer question    Negative: [1] Headache AND [2] after spinal (epidural) anesthesia AND [3] not severe    Negative: Fever present > 3 days (72 hours)    Negative: [1] MILD-MODERATE post-op pain (e.g., pain scale 1-7) AND [2] not controlled with pain medications    Negative: [1] Caller has NON-URGENT question AND [2] triager unable to answer question    Protocols used: POST-OP SYMPTOMS AND [id number]

## 2021-06-10 NOTE — ANESTHESIA CARE TRANSFER NOTE
Last vitals:   Vitals:    08/20/20 1125   BP: 113/69   Pulse: 96   Resp: 15   Temp: 36.3  C (97.4  F)   SpO2: 100%     Patient's level of consciousness is drowsy  Spontaneous respirations: yes  Maintains airway independently: yes  Dentition unchanged: yes  Oropharynx: oropharynx clear of all foreign objects    QCDR Measures:  ASA# 20 - Surgical Safety Checklist: WHO surgical safety checklist completed prior to induction    PQRS# 430 - Adult PONV Prevention: 4558F - Pt received => 2 anti-emetic agents (different classes) preop & intraop  ASA# 8 - Peds PONV Prevention: NA - Not pediatric patient, not GA or 2 or more risk factors NOT present  PQRS# 424 - Yue-op Temp Management: 4559F - At least one body temp DOCUMENTED => 35.5C or 95.9F within required timeframe  PQRS# 426 - PACU Transfer Protocol: - Transfer of care checklist used  ASA# 14 - Acute Post-op Pain: ASA14B - Patient did NOT experience pain >= 7 out of 10

## 2021-06-10 NOTE — PATIENT INSTRUCTIONS - HE

## 2021-06-14 NOTE — PROGRESS NOTES
Assessment & Plan     Carpal tunnel syndrome of left > right wrist  Although patient does endorse jamming the left ring finger, the pain appears to be more related to a carpal tunnel syndrome, which is also consistent with her increase in typing in the past few weeks.  Recommended bracing, patient has these at home already.  Offered physical therapy which is excepted.  If this does not improve symptoms, low threshold for an EMG.  - Ambulatory referral to PT/OT    Hemangioma of liver  Reviewed the most recent CT scan with patient which demonstrated a slight increase in the hemangioma compared to the ultrasound done in 2017.  - MR Liver With Without Contrast per protocol    Lightheadedness  Recommended increased hydration, monitoring of symptoms, looking way periodically when working at the computer, and reporting back if there is any improvement.      Review of the result(s) of each unique test - most recent Chest CT and 2017 abdominal ultrasound           No follow-ups on file.    Chantel Segovia MD  [facility]     Orquidea Mckeon is 40 y.o. and presents to clinic today for the following health issues:    Patient jammed her left ring finger over the summer.  She rejammed it 1 month ago.  She began having symptoms in the past few weeks of tingling and numbness as well as pain in the palm of her hand.  She wonders if it is attributable to the jamming of the finger.  She has also restarted work.  She is working from home and does not have a perfectly ergonomic set up.  She finds that it is worse at the end of day after typing all day.  It is present in the left hand as well but not as aggressively.    Patient also would like to review the imaging of her most recent CT chest which was obtained for difficulty breathing, in order to review the known hemangioma which appears to be growing.    Finally, patient also endorses some lightheadedness.  This has been present for many  months, but has worsened recently.  She believes she drinks enough water and her urine is clear.      Review of Systems  none      Objective    /80   Pulse 69   Wt 119 lb (54 kg)   LMP 08/04/2020   SpO2 98%   BMI 22.48 kg/m    Body mass index is 22.48 kg/m .  Physical Exam  GEN: No apparent distress  MSK: Tenderness to palpation at the left wrist.  Positive Tinel and Phalen sign on the left.  Improvement in symptoms with compression of the forearm.  Skin: Skin tenting present

## 2021-06-16 PROBLEM — K21.9 GASTROESOPHAGEAL REFLUX DISEASE: Status: ACTIVE | Noted: 2019-02-11

## 2021-06-16 PROBLEM — F32.81 PMDD (PREMENSTRUAL DYSPHORIC DISORDER): Status: ACTIVE | Noted: 2019-03-04

## 2021-06-16 PROBLEM — L30.9 ECZEMA: Status: ACTIVE | Noted: 2020-09-02

## 2021-06-17 NOTE — PATIENT INSTRUCTIONS - HE
Patient Instructions by Chantel Segovia MD at 2/11/2019  1:50 PM     Author: Chantel Segovia MD Service: -- Author Type: Physician    Filed: 2/11/2019  2:10 PM Encounter Date: 2/11/2019 Status: Addendum    : Chantel Segovia MD (Physician)    Related Notes: Original Note by Chantel Segovia MD (Physician) filed at 2/11/2019  2:10 PM         Patient Education     Tips to Control Acid Reflux    To control acid reflux, youll need to make some basic diet and lifestyle changes. The simple steps outlined below may be all youll need to ease discomfort.  Watch what you eat    Avoid fatty foods and spicy foods.    Eat fewer acidic foods, such as citrus and tomato-based foods. These can increase symptoms.    Limit drinking alcohol, caffeine, and fizzy beverages. All increase acid reflux.    Try limiting chocolate, peppermint, and spearmint. These can worsen acid reflux in some people.  Watch when you eat    Avoid lying down for 3 hours after eating.    Do not snack before going to bed.  Raise your head  Raising your head and upper body by 4 to 6 inches helps limit reflux when youre lying down. Put blocks under the head of your bed frame to raise it.  Other changes    Lose weight, if you need to    Dont exercise near bedtime    Avoid tight-fitting clothes    Limit aspirin and ibuprofen    Stop smoking   Date Last Reviewed: 7/1/2016 2000-2017 [employer]. [address] [place] [address], [place] [address]. All rights reserved. This information is not intended as a substitute for professional medical care. Always follow your healthcare professional's instructions.

## 2021-06-17 NOTE — PROGRESS NOTES
Assessment/Plan:     Patient presents to clinic for establish medical care.  She is experiencing an aching discomfort with both urination and orgasm, that is accompanied by increased urinary frequency and occasional radiation to her back.  No physical exam is performed today as patient has been seen by several specialists and the likelihood of finding something readily apparent on examination seemed unlikely.  For the same reason, will not repeat a UA which has already returned normal.  Patient was tested negative for sexually transmitted infections and for her wet prep.  She is currently on doxycycline for endometritis, but has not noticed any improvement.  I did recommend that she continue with the referral to the pelvic floor physical therapy clinic, patient will go at least once to see if she derives any benefit.  Recommended naproxen prior to intercourse by 30 minutes to see if there is any improvement, to trial intercourse with a condom, to trial pyrimidine, and to obtain a urological evaluation.  Patient will return having attempted these experiments and with that formal consultation.    Discontinued Medications:  There are no discontinued medications.    AVS printed and given to patient.  Return to clinic after urology exam.    Total time spent with patient was 25 minutes with greater than 50% spent in face-to-face counseling regarding the above plan.    This note has been dictated using voice recognition software. Any grammatical or context distortions are unintentional and inherent to the the software.     Chantel Segovia MD  Family [facility]      Subjective:      Orquidea Mckeon is a 37 y.o. female who presents to clinic for pelvic pain ×3 months.    Patient has been experiencing discomfort for approximately 3 months.  It has been increasing in both frequency and duration since its inception.  Prior to this point she has never experienced any discomfort like this before.  It does come  "and go.  The discomfort seems to be triggered by urination and by orgasm.  After an orgasm, the pain will become worse for the next 2 days.  The pain is located \"deep\", occasionally attributed to the cervix, does not appear to be related to the surface of the urethra.  The pain does occasionally radiate to her back.  There is no pain with actual intercourse/penetration.  The discomfort is described as an ache, that occasionally moves her to tears in severity.  It was worse the past 3 days, this coincided with the beginning of her menstrual cycle.  Prior to that, menstrual cycle seems not to affect the sensation.  She has not had any new partners, and nothing has changed about her sex life.  She is not noticing any change in her vaginal discharge, previously was more clumpy and now it is more normal.  She was never treated with an antifungal agent.  Patient has noticed increasing urinary frequency and pain with urination described as an aching, not a burning sensation.   She was encouraged to take doxycycline to treat endometritis, but has not noticed any improvement.  She was encouraged to take naproxen but has not yet begun.  She was referred to pelvic therapy but has not yet attended these treatments.  The ultrasound did demonstrate small fibroids on the uterus, it is unlikely that these are causing her pain.  All other examinations and lab work has been essentially normal.    Old records reviewed:   - patient has not been seen in our clinic since 2013  - previous note from ob/gyn in outside health system  - previous labs/imagining: gc/chlamydia, wet prep, pap, ua, ultrasound, etc    Past Medical History, Family History, and Social History reviewed.   History   Smoking Status     Never Smoker   Smokeless Tobacco     Never Used       Review of systems is as stated in HPI.  Patient endorses: back pain, increased urination, pain with urination  The remainder of the 10 system review is otherwise " "negative.    Objective:     /72  Pulse 70  Ht 5' 8\" (1.727 m)  Wt 134 lb (60.8 kg)  LMP 04/29/2018  SpO2 98%  BMI 20.37 kg/m2 Body mass index is 20.37 kg/(m^2).    Gen: Alert, NAD, appears stated age, normal hygiene   Psych: no apparent hallucinations or delusions, no pressured speech; alert, oriented x3      No current outpatient prescriptions on file prior to visit.     No current facility-administered medications on file prior to visit.                "

## 2021-06-17 NOTE — PATIENT INSTRUCTIONS - HE
Patient Instructions by Chantel Segovia MD at 5/31/2019  1:30 PM     Author: Chantel Segovia MD Service: -- Author Type: Physician    Filed: 5/31/2019  1:52 PM Encounter Date: 5/31/2019 Status: Addendum    : Chantel Segovia MD (Physician)    Related Notes: Original Note by Chantel Segovia MD (Physician) filed at 5/31/2019  1:52 PM       Avoid: sodium lauryl sulfate      Patient Education     Understanding Canker Sores  Canker sores are small, painful sores inside the mouth. They occur most often on the tongue, gums, or insides of the cheeks. The medical term for canker sores is aphthous ulcers.  What causes a canker sore?  The exact cause of canker sores is not known, but they are linked to a number of conditions. These include:    An injury or irritation in the mouth, such as biting the inside of your cheek or braces rubbing    Allergy or sensitivity to certain foods or substances, such as citrus juice or some kinds of toothpaste    Poor nutrition    Emotional stress    Certain infections and illnesses  Canker sores tend to run in families.  What are the symptoms of a canker sore?  These are some common traits of canker sores:    Sores are open and grayish-yellow, surrounded by redness.    Sores are usually painful and sensitive to touch.    Canker sores may be preceded by a burning or tingling sensation a few hours to a few days before the sore appears.    Children and teens tend to get canker sores more often than adults.  How are canker sores treated?  Canker sores usually go away by themselves within 10 to 14 days. There is no cure for canker sores. Treatment focuses on relieving symptoms and shortening outbreaks. Treatments may include:    Prescription or over-the-counter skin treatments to apply to the sores. Steroids for your skin (topical) may protect the canker sores from further irritation and allow them to heal. Topical pain relief medicines may numb the area and make the  sores less painful.    Certain types of toothpaste. These do not contain sodium lauryl sulfate. This type of toothpaste may prevent further aggravation of canker sores.    Oral prescription medicines. These are used for severe cases to help relieve symptoms.    Prescription or over-the-counter pain medicines. These help with discomfort.  What are the complications of a canker sore?  Mouth sores that seem to be canker sores can be signs of a more serious illness. If you have other signs of illness along with mouth sores, you should talk with a healthcare provider. Canker sores can be so painful that they interfere with talking, eating, or drinking.  When should I call my healthcare provider?  Call your healthcare provider right away if you have any of these:    Canker sores that dont go away after 2 weeks    Canker sores that come back more than 3 times a year    Canker sores that are larger than about a half-inch across    Fever of 100.4 F (38 C) or higher, or as directed    Pain that gets worse    You arent able to eat or drink because of painful sores    Symptoms that dont get better, or symptoms that get worse    New symptoms   Date Last Reviewed: 5/1/2016 2000-2017 [employer]. [address] [place] [address], [place] [address]. All rights reserved. This information is not intended as a substitute for professional medical care. Always follow your healthcare professional's instructions.

## 2021-06-18 NOTE — LETTER
Letter by Chantel Segovia MD at      Author: Chantel Segovia MD Service: -- Author Type: --    Filed:  Encounter Date: 2/11/2019 Status: (Other)       February 11, 2019     Patient: Orquidea Mckeon   YOB: 1980   Date of Visit: 2/11/2019       To Whom It May Concern:    It is my medical opinion that Orquidea Mckeon may return to work on 2/12 provided symptoms have improved.    If you have any questions or concerns, please don't hesitate to call.    Sincerely,        Electronically signed by Chantel Segovia MD

## 2021-06-18 NOTE — LETTER
Letter by Chantel Segovia MD at      Author: Chantel Segovia MD Service: -- Author Type: --    Filed:  Encounter Date: 2/11/2019 Status: (Other)       February 11, 2019     Patient: Orquidea Mckeon   YOB: 1980   Date of Visit: 2/11/2019       To Whom it May Concern:    Orquidea Mckeon was seen in my clinic on 2/11/2019.    If you have any questions or concerns, please don't hesitate to call.    Sincerely,         Electronically signed by Chantel Segovia MD

## 2021-06-19 NOTE — LETTER
Letter by Chantel Segovia MD at      Author: Chantel Segovia MD Service: -- Author Type: --    Filed:  Encounter Date: 6/18/2019 Status: (Other)         June 18, 2019     Patient: Orquidea Mckeon   YOB: 1980   Date of Visit: 6/18/2019       To Whom It May Concern:    It is my medical opinion that Orquidea Mckeon should not return to work before 6/21.    If you have any questions or concerns, please don't hesitate to call.    Sincerely,        Electronically signed by Chantel Segovia MD

## 2021-06-19 NOTE — LETTER
Letter by Chantel Segovia MD at      Author: Chantel Segovia MD Service: -- Author Type: --    Filed:  Encounter Date: 9/13/2019 Status: (Other)         September 13, 2019     Patient: Orquidea Mckeon   YOB: 1980   Date of Visit: 9/13/2019       To Whom it May Concern:    Orquidea Mckeon was seen in my clinic on 9/13/2019.  She will need to have her workstation reevaluated for ergonomic optimization.    If you have any questions or concerns, please don't hesitate to call.    Sincerely,         Electronically signed by Chantel Segovia MD

## 2021-06-20 NOTE — PROGRESS NOTES
"  Assessment/Plan:     Patient was due for Pap smear so, although she presented for erythema of the cervix is visualized by the urologist, we chose to do a Pap.  However, during the Pap we noticed a cervical polyp and a slightly abnormal appearance to the transition zone.  We will await the results and make a plan going forward once these are known.  Also obtained a wet prep based on the vaginal discharge discussion and the friability of the cervix, very scant vaginal discharge appreciated on examination.    Problem List Items Addressed This Visit     None      Visit Diagnoses     Cervix abnormality    -  Primary    Relevant Orders    Wet Prep, Vaginal    Orgasm dysfunction        Relevant Orders    Wet Prep, Vaginal    Screening for cervical cancer        Relevant Orders    Gynecologic Cytology (PAP Smear)          Return to clinic PRN.    Total time spent with patient was 15 minutes with greater than 50% spent in face-to-face counseling regarding the above plan.    Subjective:      Orquidea Mckeon is a 38 y.o. female who presents to clinic for \"redness of the cervix\".    Patient has been suffering from pain with orgasm for several months now.  She states it is slightly improved but still not resolved.  She has been seen by urology who noted an erythematous cervix.  Patient would like this evaluated.  Other than the known orgasmic difficulties, patient has no concerns.  She does note a slightly increased viscosity to the vaginal discharge.      Past Medical History, Family History, and Social History reviewed.     Current Outpatient Prescriptions on File Prior to Visit   Medication Sig Dispense Refill     polyethylene glycol (GLYCOLAX) 17 gram/dose powder Take 17 g by mouth.       ranitidine (ZANTAC) 150 MG tablet TAKE ONE TABLET BY MOUTH TWICE A DAY       simethicone (MYLICON) 80 MG chewable tablet Chew 80 mg every 6 (six) hours as needed for flatulence.       No current facility-administered medications on file " "prior to visit.        Review of systems is as stated in HPI.  Endorses: weight changes, leg cramps, back pain  The remainder of the 10 system review is otherwise negative.    Objective:     /70  Pulse 77  Ht 5' 8\" (1.727 m)  Wt 136 lb (61.7 kg)  SpO2 98%  BMI 20.68 kg/m2 Body mass index is 20.68 kg/(m^2).    Gen: Alert, NAD, appears stated age, normal hygiene   :   - external genitalia: normal appearance, no lesions, no flattening of the anatomic structures  - urethral meatus: without prolapse, no obvious irritation  - vagina: expected resilience given estrogen status, no cystocele or rectocele, normal discharge  - cervix: Cervical polyp presents, abnormal appearance to the transition zone with some increased erythema and friability, no lesions, no discharge, no cervical prolapse appreciated  - uterus: anteverted, no uterine prolapse appreciated  - anus and perineum: normal and without lesions      This note has been dictated using voice recognition software. Any grammatical or context distortions are unintentional and inherent to the the software.     Chantel Segovia MD      "

## 2021-06-21 NOTE — PROGRESS NOTES
Assessment/Plan:     Patient presents to clinic with 4 weeks of low back pain that is worsening. However, on examination, I suspect a component of sacroiliac joint dysfunction. No warning signs to indicate spinal cord compression.    1. SI (sacroiliac) joint dysfunction with Acute bilateral low back pain without sciatica  - handout on symptomatic treatment  - recommend stretching and exercises  - PT ordered  - flexeril for night time use predominantly     Discontinued Medications:  There are no discontinued medications.    AVS printed and given to patient.  Return to clinic in 6 months for physical.     Total time spent with patient was 15 minutes with greater than 50% spent in face-to-face counseling regarding the above plan.    This note has been dictated using voice recognition software. Any grammatical or context distortions are unintentional and inherent to the the software.     Chantel Segovia MD  Family Medicine [facility]      Subjective:      Orquidea Mckeon is a 38 y.o. female who presents to clinic for low back pain.    Location/radiation: low back pain, but also experiences shoulder and arm pain; does not radiate  Quality: endorses a 'locking up sensation' in her low back, pressure  Severity: 6/10 at its worst, 5/10 now  Length of time since symptoms began: 4 weeks, worsening  Frequency, episode duration, regularity: constant  Triggers or situations in which it occurs: worsens with stillness, better with movement  Modifying factors: ibuprofen does not seem to help; she has not tried heat or ice  Associated signs and sx: nausea, unsure if that is related    Patient states they may have experienced this type of pain before.  She specifically denies red flag symptoms.     Past Medical History, Family History, and Social History reviewed.   History   Smoking Status     Never Smoker   Smokeless Tobacco     Never Used       Review of systems is as stated in HPI.  Patient endorses: back pain, lump on  "heel  The remainder of the 10 system review is otherwise negative.    Objective:     /70  Pulse 61  Ht 5' 8\" (1.727 m)  Wt 135 lb (61.2 kg)  SpO2 98%  BMI 20.53 kg/m2 Body mass index is 20.53 kg/(m^2).    Gen: Alert, NAD, appears stated age, normal hygiene   MSK: grossly full range of motion in all joints, no obvious deformity; full ROM of spine, tenderness to palpation in bilateral lower lumbar/sacral area but not in the midline, full ROM of bilateral hips, no worsening of pain with bridging maneuvers        Current Outpatient Prescriptions on File Prior to Visit   Medication Sig Dispense Refill     polyethylene glycol (GLYCOLAX) 17 gram/dose powder Take 17 g by mouth.       ranitidine (ZANTAC) 150 MG tablet TAKE ONE TABLET BY MOUTH TWICE A DAY       simethicone (MYLICON) 80 MG chewable tablet Chew 80 mg every 6 (six) hours as needed for flatulence.       No current facility-administered medications on file prior to visit.                "

## 2021-06-23 NOTE — PROGRESS NOTES
Assessment/Plan:     Patient presents to clinic for low energy and lightheadedness.  I suspect the lightheadedness is secondary to dehydration as she is not taking as much water as normal.  In addition, this could have been compounded by patient's diarrhea and vomiting.  Recommend increased hydration, rest, and monitoring for worsening symptoms.  Patient does seem to be improving, note was written for work.  It is unclear if this is secondary to influenza virus, gastrointestinal illness, or some other cause.  Physical exam is benign and vital signs are within normal limits.    For patient's acid reflux, handout was provided on behavior modifications, I am comfortable the patient discontinue the Zantac although I do not believe this is the cause of her friends mother's esophageal surgery.  Discussed that intermittent GERD is not a concern.    Problem List Items Addressed This Visit     Gastroesophageal reflux disease, esophagitis presence not specified      Other Visit Diagnoses     Low energy    -  Primary          Return to clinic for physical.   Total time spent with patient was 15 minutes with greater than 50% spent in face-to-face counseling regarding the above plan.    Subjective:      Orquidea Mckeon is a 38 y.o. female who presents to clinic for several concerns.    On Friday, patient vomited twice and had diarrhea x 1-2x. Patient then experienced body aches and feeling exhausted. She does not feel she had a fever. She endorses some current dizziness described as lightheadedness. She has not had the lightheadedness today. Her energy is low but better today. She has had very low energy over the weekend. She denies any cold symptoms. Her son was sick almost one week ago but he recovered more quickly.     She did stop taking her acid reflux medication last month.  Patient had heard that taking Zantac could cause the need for surgical intervention on the esophagus.  She does experience symptoms of acid  "reflux.      Past Medical History, Family History, and Social History reviewed.     Current Outpatient Medications on File Prior to Visit   Medication Sig Dispense Refill     polyethylene glycol (GLYCOLAX) 17 gram/dose powder Take 17 g by mouth.       [DISCONTINUED] cyclobenzaprine (FLEXERIL) 5 MG tablet Take 1 tablet (5 mg total) by mouth 3 (three) times a day as needed for muscle spasms. 30 tablet 0     [DISCONTINUED] ranitidine (ZANTAC) 150 MG tablet TAKE ONE TABLET BY MOUTH TWICE A DAY       [DISCONTINUED] simethicone (MYLICON) 80 MG chewable tablet Chew 80 mg every 6 (six) hours as needed for flatulence.       No current facility-administered medications on file prior to visit.        Review of systems is as stated in HPI.  The remainder of the 10 system review is otherwise negative.    Objective:     /60 (Patient Site: Right Arm, Patient Position: Sitting, Cuff Size: Adult Regular)   Pulse 70   Ht 5' 1\" (1.549 m)   Wt 129 lb 3.2 oz (58.6 kg)   SpO2 98%   BMI 24.41 kg/m   Body mass index is 24.41 kg/m .    Gen: Alert, NAD, appears stated age, normal hygiene, appears fatigued  Eyes: conjunctivae without injection, sclera clear, EOMI  ENT/mouth: nares clear, septum midline, absent rhinorrhea  CV: RRR, no murmur appreciated, pedal edema absent bilaterally  Resp: CTAB, no wheezes, rales or ronchi  ABD: non-tender to palpation, nondistended  MSK: grossly full range of motion in all joints, no obvious deformity  Neuro: CN II-XII grossly intact, no deficits in coordination  Psych: no apparent hallucinations or delusions, no pressured speech; alert, oriented x3  SKIN: dry and without lesions  Heme/lymph: no pallor, no active bleeding/bruising, no adenopathy appreciated    This note has been dictated using voice recognition software. Any grammatical or context distortions are unintentional and inherent to the the software.     Chantel Segovia MD      "

## 2021-06-24 NOTE — PROGRESS NOTES
Assessment/Plan:     Patient presents today for routine physical examination.    Healthcare Maintenance: USPSTF recommendations for age 38;  Patient has been counseled on/screened for:  - the benefits of supplemental folic acid   - intimate partner violence and there are no concerns at this time  - a healthful diet and physical activity for CVD disease prevention  - Diabetes and hyperlipidemia: screening was performed.   Sexually transmitted infections: Patient would like to be screened for chlamydia, gonorrhea, syphilis, HIV, and hepatitis  Immunizations: up to date   Cervical Cancer Screening: patient has been screened for cervical cancer per protocol in 2018, results were negative with negative hpv, next testing in 2023      Additional concerns are as detailed below:    Patient presents to clinic for routine healthcare maintenance this is increase in irritability around her menstrual cycle.    1. PMDD (premenstrual dysphoric disorder)  After discussion that we could regulate her periods or regulate her mood to approach this issue, patient would like to try a mood management medication first as she does have a bilateral tubal ligation.  We will trial fluoxetine 10 mg once daily for at least 6 weeks to 2 months.  At that time, patient can return for increased dosing or she may wait until I return from leave.  - FLUoxetine (PROZAC) 10 MG capsule; Take 1 capsule (10 mg total) by mouth daily.  Dispense: 90 capsule; Refill: 3    2. Gross hematuria  No symptoms currently, but patient does state that she has some blood in her urine when she strains.  No recent UA on file.  - Urinalysis-UC if Indicated      AVS printed and given to patient.  Return to clinic in 1 year.    I have had an Advance Directives discussion with the patient.    Total time spent with patient was 40 minutes with greater than 50% spent in face-to-face counseling regarding the above plan.    This note has been dictated using voice recognition  "software. Any grammatical or context distortions are unintentional and inherent to the the software.     Chantel Segovia MD  Family Medicine [facility]    Subjective:     Orquidea Mckeon is a 38 y.o. female who presents to clinic for routine physical.    Additional concerns include:    1.  Patient notices that her \"periods seem to last 3 weeks\".  She will only have 1 week of actual menstruation, but has an increase in crabbiness for 3 weeks surrounding the period.  Wonders what can be done about this.    Diet: well balanced diet and except on the weekends  Physical Activity: The patient maintains a regular, healthy exercise program.    PHQ-2 Score:  0    Health Care Directive: not on file but handout provided    Patient Care Team:   PCP: Chantel Segovia     Past Medical History, Family History, and Social History reviewed.     Review of systems is as stated in HPI.  Patient endorses: none  The remainder of the 10 system review is otherwise negative.    Objective:     /80   Pulse 63   Ht 5' 0.5\" (1.537 m)   Wt 132 lb (59.9 kg)   SpO2 98%   BMI 25.36 kg/m    Gen: Alert, NAD, appears stated age, normal hygiene   Eyes: conjunctivae without injection, sclera clear, EOMI  ENT/mouth: nares clear, septum midline, absent rhinorrhea,absent pharyngeal injection, neck is supple, no thyroid enlargement  CV: RRR, no murmur appreciated, pedal edema absent bilaterally  Resp: CTAB, no wheezes, rales or ronchi  ABD: normoactive, non-tender to palpation, nondistended  MSK: grossly full range of motion in all joints, no obvious deformity  Neuro: CN II-XII grossly intact, no deficits in coordination  Psych: no apparent hallucinations or delusions, no pressured speech; alert, oriented x3  SKIN: dry and without lesions  Heme/lymph: no pallor, no active bleeding/bruising, no adenopathy appreciated  Breast: Implants present, nontender to palpation, no nipple discharge, no masses appreciated    Medications:  Current " Outpatient Medications   Medication Sig     FLUoxetine (PROZAC) 10 MG capsule Take 1 capsule (10 mg total) by mouth daily.       Allergies:  Allergies   Allergen Reactions     Other Allergy (See Comments)      Contrast Media [employer]-Box MISC, 2008.; Contrast Media Ready-Box MISC, 2008.         PMH:  Past Medical History:   Diagnosis Date     Hemangioma     location: liver       PSH:  Past Surgical History:   Procedure Laterality Date     BREAST SURGERY       DE APPENDECTOMY      Description: Appendectomy;  Recorded: 2008;     DE LIGATE FALLOPIAN TUBE      Description: Tubal Ligation;  Recorded: 2013;  Comments: for sterilization on        Family Hx:  Family History   Problem Relation Age of Onset     Hemangiomas Sister      Hemangiomas Brother      Breast cancer Maternal Grandmother      Kidney disease Maternal Grandfather      Alcohol abuse Maternal Grandfather        Social History:  Social History     Socioeconomic History     Marital status:      Spouse name: Not on file     Number of children: Not on file     Years of education: Not on file     Highest education level: Not on file   Occupational History     Not on file   Social Needs     Financial resource strain: Not on file     Food insecurity:     Worry: Not on file     Inability: Not on file     Transportation needs:     Medical: Not on file     Non-medical: Not on file   Tobacco Use     Smoking status: Former Smoker     Last attempt to quit: 3/4/2006     Years since quittin.0     Smokeless tobacco: Never Used   Substance and Sexual Activity     Alcohol use: Yes     Alcohol/week: 0.6 oz     Types: 1 Glasses of wine per week     Drug use: No     Sexual activity: Yes     Partners: Male     Birth control/protection: Surgical   Lifestyle     Physical activity:     Days per week: Not on file     Minutes per session: Not on file     Stress: Not on file   Relationships     Social connections:     Talks on phone: Not on file      Gets together: Not on file     Attends Buddhism service: Not on file     Active member of club or organization: Not on file     Attends meetings of clubs or organizations: Not on file     Relationship status: Not on file     Intimate partner violence:     Fear of current or ex partner: Not on file     Emotionally abused: Not on file     Physically abused: Not on file     Forced sexual activity: Not on file   Other Topics Concern     Not on file   Social History Narrative     Not on file       LDL Calculated (mg/dL)   Date Value   12/12/2011 105        Immunization History   Administered Date(s) Administered     Influenza, inj, historic,unspecified 10/30/2015, 10/20/2016, 11/15/2017     Influenza, seasonal,quad inj 6-35 mos 12/12/2011     Influenza,seasonal quad, PF, 36+MOS 10/14/2014     Td, Adult, Absorbed 11/19/1998     Td,adult,historic,unspecified 11/19/1998     Tdap 12/12/2011     There are no preventive care reminders to display for this patient.

## 2021-06-26 ENCOUNTER — HEALTH MAINTENANCE LETTER (OUTPATIENT)
Age: 41
End: 2021-06-26

## 2021-08-26 ENCOUNTER — OFFICE VISIT (OUTPATIENT)
Dept: FAMILY MEDICINE | Facility: CLINIC | Age: 41
End: 2021-08-26
Payer: COMMERCIAL

## 2021-08-26 VITALS
SYSTOLIC BLOOD PRESSURE: 96 MMHG | BODY MASS INDEX: 23.81 KG/M2 | HEART RATE: 74 BPM | OXYGEN SATURATION: 98 % | WEIGHT: 126 LBS | DIASTOLIC BLOOD PRESSURE: 60 MMHG

## 2021-08-26 DIAGNOSIS — R30.0 DYSURIA: ICD-10-CM

## 2021-08-26 DIAGNOSIS — N76.0 VAGINITIS AND VULVOVAGINITIS: Primary | ICD-10-CM

## 2021-08-26 LAB
ALBUMIN UR-MCNC: NEGATIVE MG/DL
APPEARANCE UR: CLEAR
BILIRUB UR QL STRIP: NEGATIVE
CLUE CELLS: NORMAL
COLOR UR AUTO: YELLOW
GLUCOSE UR STRIP-MCNC: NEGATIVE MG/DL
HGB UR QL STRIP: NEGATIVE
KETONES UR STRIP-MCNC: NEGATIVE MG/DL
LEUKOCYTE ESTERASE UR QL STRIP: NEGATIVE
NITRATE UR QL: NEGATIVE
PH UR STRIP: 7 [PH] (ref 5–8)
SP GR UR STRIP: 1.01 (ref 1–1.03)
TRICHOMONAS, WET PREP: NORMAL
UROBILINOGEN UR STRIP-ACNC: 0.2 E.U./DL
WBC'S/HIGH POWER FIELD, WET PREP: NORMAL
YEAST, WET PREP: NORMAL

## 2021-08-26 PROCEDURE — 99213 OFFICE O/P EST LOW 20 MIN: CPT | Performed by: FAMILY MEDICINE

## 2021-08-26 PROCEDURE — 81003 URINALYSIS AUTO W/O SCOPE: CPT | Performed by: FAMILY MEDICINE

## 2021-08-26 PROCEDURE — 87210 SMEAR WET MOUNT SALINE/INK: CPT | Performed by: FAMILY MEDICINE

## 2021-08-26 NOTE — PROGRESS NOTES
Assessment & Plan     Vaginitis and vulvovaginitis  Wet prep is normal.  Clinical exam and symptoms consistent with yeast vaginitis.  Treat with diflucan.  Notify clinic if symptoms persist.  - Wet prep - Clinic Collect  - fluconazole (DIFLUCAN) 150 MG tablet; Take 1 tablet (150 mg) by mouth once for 1 dose Repeat in 3-7 days if symptoms persist.    Dysuria  Ua is negative.  Symptoms likely due to above  - UA Macro with Reflex to Micro and Culture - lab collect; Future  - UA Macro with Reflex to Micro and Culture - lab collect    Chiquis Sanchez MD  [facility] TOM Heard is a 41 year old who presents for the following health issues  HPI   Symptoms present for 1 week, started with 3 days of discharge and now discomfort.  Doesn't feel like she's emptying her bladder and pelvic bones sore. Monogamous, but not active recently.    No fevers, chills or flank pain.  Normal menses  Otherwise healthy      Review of Systems   CONSTITUTIONAL: NEGATIVE for fever, chills, change in weight  ENT/MOUTH: NEGATIVE for ear, mouth and throat problems  RESP: NEGATIVE for significant cough or SOB  CV: NEGATIVE for chest pain, palpitations or peripheral edema      Objective    BP 96/60 (BP Location: Left arm, Patient Position: Sitting, Cuff Size: Adult Regular)   Pulse 74   Wt 57.2 kg (126 lb)   SpO2 98%   BMI 23.81 kg/m    Body mass index is 23.81 kg/m .  Physical Exam   GENERAL: healthy, alert and no distress  RESP: lungs clear to auscultation - no rales, rhonchi or wheezes  CV: regular rate and rhythm, normal S1 S2, no S3 or S4, no murmur, click or rub, no peripheral edema and peripheral pulses strong  ABDOMEN: soft, nontender, no hepatosplenomegaly, no masses and bowel sounds normal   (female): normal female external genitalia, normal urethral meatus , vaginal mucosa pink, moist, well rugated, vaginal discharge - white, curd-like and odorless and normal cervix, adnexae, and uterus  without masses.  MS: no gross musculoskeletal defects noted, no edema

## 2021-08-27 ENCOUNTER — TELEPHONE (OUTPATIENT)
Dept: FAMILY MEDICINE | Facility: CLINIC | Age: 41
End: 2021-08-27

## 2021-08-27 RX ORDER — FLUCONAZOLE 150 MG/1
150 TABLET ORAL ONCE
Qty: 2 TABLET | Refills: 0 | Status: SHIPPED | OUTPATIENT
Start: 2021-08-27 | End: 2021-08-27

## 2021-08-27 NOTE — TELEPHONE ENCOUNTER
I reached pt and we spoke today. Pt is aware of the result message below through "[employer] and was notified by her Pharmacy of the medication. Pt will update us if there are no improvements.

## 2021-08-27 NOTE — TELEPHONE ENCOUNTER
----- Message from Chiquis Sanchez MD sent at 8/27/2021  9:14 AM CDT -----  Clinically your symptoms are consistent with yeast, despite the lab results.  I am sending a prescription to your pharmacy for diflucan.  You should feel better over the weekend.  Please let me know Monday if your symptoms persist.

## 2021-09-16 ENCOUNTER — OFFICE VISIT (OUTPATIENT)
Dept: FAMILY MEDICINE | Facility: CLINIC | Age: 41
End: 2021-09-16
Payer: COMMERCIAL

## 2021-09-16 VITALS — SYSTOLIC BLOOD PRESSURE: 102 MMHG | DIASTOLIC BLOOD PRESSURE: 72 MMHG | HEART RATE: 60 BPM | OXYGEN SATURATION: 98 %

## 2021-09-16 DIAGNOSIS — H53.9 ABNORMAL VISUAL PERCEPTION: ICD-10-CM

## 2021-09-16 DIAGNOSIS — D18.03 HEMANGIOMA OF LIVER: Primary | ICD-10-CM

## 2021-09-16 DIAGNOSIS — R45.4 IRRITABILITY: ICD-10-CM

## 2021-09-16 PROBLEM — R10.2 PAIN IN PELVIS: Status: ACTIVE | Noted: 2020-09-02

## 2021-09-16 PROCEDURE — 99214 OFFICE O/P EST MOD 30 MIN: CPT | Performed by: FAMILY MEDICINE

## 2021-09-16 RX ORDER — DESVENLAFAXINE 25 MG/1
25 TABLET, EXTENDED RELEASE ORAL DAILY
Qty: 30 TABLET | Refills: 3 | Status: SHIPPED | OUTPATIENT
Start: 2021-09-16 | End: 2021-10-15

## 2021-09-16 NOTE — PROGRESS NOTES
"    Assessment & Plan     Hemangioma of liver  - CT Abdomen w/o Contrast    Irritability  Patient was significantly tearful during her examination.  When pressed she described a feeling of \"lost\".  I do not believe this is related to any kind of dementia but believe that she is suffering emotionally.  I strongly encouraged her to consider an antianxiety medication which may help with the abnormal visual perception she endorses.  - desvenlafaxine succinate (PRISTIQ) 25 MG 24 hr tablet  Dispense: 30 tablet; Refill: 3    Abnormal visual perception  Patient also describes fleeting instances of seeing \"something\" at the corner of her eye that when she turns to stare directly at it, disappears.  What she describes are shaped like spiders or small people.  This may be concern for Lewy body dementia, although should be an atypical patient for this.  She is significantly concerned about it and I think that a brain MRI would be our best first step.  After that could consider referral to neurology if symptoms persist.  - brain MRI        Return in about 4 weeks (around 10/14/2021).    Chantel Segovia MD  [facility]    Imani Heard is a 41 year old who presents for the following health issues     HPI     Ct abdomen without contrast for liver was never performed. She has a known liver hemangioma.     Other concern about gallbladder:  Cramp on the left lateral abdomen  Mom had gallbladder dysfunction  Wants to know if it can be checked  Only occasionally does the stool float  Normal color of stool    Finally patient started seeing things at the corner of her eye. When she turns to look fully at them she cannot find them. She where she is hallucinating. She states she sees things that resembles small spiders, small people, or sometimes something darting across the road. It is starting to \"freak her out\".    We delved more into this, patient describes feeling \"lost\" but could not elaborate. She " became very tearful.    Review of Systems   Endorses: fatigue, dizziness, depression/anxiety symptoms  Constitutional, HEENT, cardiovascular, pulmonary, GI, , musculoskeletal, neuro, skin, endocrine and psych systems are negative, except as otherwise noted.      Objective    /72   Pulse 60   SpO2 98%   There is no height or weight on file to calculate BMI.  Physical Exam   Gen: NAD  Psych: Normal affect, no hallucinations or delusions, very tearful

## 2021-09-27 ENCOUNTER — HOSPITAL ENCOUNTER (OUTPATIENT)
Dept: CT IMAGING | Facility: CLINIC | Age: 41
Discharge: HOME | End: 2021-09-27
Attending: FAMILY MEDICINE | Admitting: FAMILY MEDICINE
Payer: COMMERCIAL

## 2021-09-27 DIAGNOSIS — D18.03 HEMANGIOMA OF LIVER: ICD-10-CM

## 2021-09-27 PROCEDURE — 74150 CT ABDOMEN W/O CONTRAST: CPT

## 2021-09-28 ENCOUNTER — TELEPHONE (OUTPATIENT)
Dept: FAMILY MEDICINE | Facility: CLINIC | Age: 41
End: 2021-09-28

## 2021-09-28 DIAGNOSIS — D18.03 HEMANGIOMA OF LIVER: Primary | ICD-10-CM

## 2021-09-30 DIAGNOSIS — Z11.52 ENCOUNTER FOR SCREENING FOR COVID-19: Primary | ICD-10-CM

## 2021-10-01 ENCOUNTER — LAB (OUTPATIENT)
Dept: LAB | Facility: CLINIC | Age: 41
End: 2021-10-01
Payer: COMMERCIAL

## 2021-10-01 DIAGNOSIS — Z11.52 ENCOUNTER FOR SCREENING FOR COVID-19: ICD-10-CM

## 2021-10-01 PROCEDURE — 99000 SPECIMEN HANDLING OFFICE-LAB: CPT

## 2021-10-01 PROCEDURE — 86769 SARS-COV-2 COVID-19 ANTIBODY: CPT | Mod: 90

## 2021-10-01 PROCEDURE — 36415 COLL VENOUS BLD VENIPUNCTURE: CPT

## 2021-10-04 LAB
SARS-COV-2 AB SERPL IA-ACNC: 61 U/ML
SARS-COV-2 AB SERPL QL IA: POSITIVE

## 2021-10-05 ENCOUNTER — IMMUNIZATION (OUTPATIENT)
Dept: NURSING | Facility: CLINIC | Age: 41
End: 2021-10-05
Payer: COMMERCIAL

## 2021-10-05 PROCEDURE — 91300 PR COVID VAC PFIZER DIL RECON 30 MCG/0.3 ML IM: CPT

## 2021-10-05 PROCEDURE — 0001A PR COVID VAC PFIZER DIL RECON 30 MCG/0.3 ML IM: CPT

## 2021-10-08 ENCOUNTER — MYC MEDICAL ADVICE (OUTPATIENT)
Dept: FAMILY MEDICINE | Facility: CLINIC | Age: 41
End: 2021-10-08

## 2021-10-08 ENCOUNTER — E-VISIT (OUTPATIENT)
Dept: URGENT CARE | Facility: URGENT CARE | Age: 41
End: 2021-10-08
Payer: COMMERCIAL

## 2021-10-08 ENCOUNTER — NURSE TRIAGE (OUTPATIENT)
Dept: NURSING | Facility: CLINIC | Age: 41
End: 2021-10-08

## 2021-10-08 ENCOUNTER — LAB (OUTPATIENT)
Dept: FAMILY MEDICINE | Facility: CLINIC | Age: 41
End: 2021-10-08
Attending: PREVENTIVE MEDICINE
Payer: COMMERCIAL

## 2021-10-08 DIAGNOSIS — Z20.822 SUSPECTED COVID-19 VIRUS INFECTION: ICD-10-CM

## 2021-10-08 DIAGNOSIS — Z20.822 SUSPECTED COVID-19 VIRUS INFECTION: Primary | ICD-10-CM

## 2021-10-08 LAB — SARS-COV-2 RNA RESP QL NAA+PROBE: NEGATIVE

## 2021-10-08 PROCEDURE — U0005 INFEC AGEN DETEC AMPLI PROBE: HCPCS

## 2021-10-08 PROCEDURE — U0003 INFECTIOUS AGENT DETECTION BY NUCLEIC ACID (DNA OR RNA); SEVERE ACUTE RESPIRATORY SYNDROME CORONAVIRUS 2 (SARS-COV-2) (CORONAVIRUS DISEASE [COVID-19]), AMPLIFIED PROBE TECHNIQUE, MAKING USE OF HIGH THROUGHPUT TECHNOLOGIES AS DESCRIBED BY CMS-2020-01-R: HCPCS

## 2021-10-08 PROCEDURE — 99421 OL DIG E/M SVC 5-10 MIN: CPT | Performed by: PREVENTIVE MEDICINE

## 2021-10-08 NOTE — TELEPHONE ENCOUNTER
Hey,   I had a covid vaccine Tuesday about 2:00. I woke up Wednesday with body aches, exhausted, hot/ cold flashes. The body aches went away but yesterday and still today I am nauseous when I eat and don't eat, get exhausted quickly and am still getting hot/ cold flashes.. Is there something I need to be doing/ worrying about?      thank you   Orquidea

## 2021-10-08 NOTE — PATIENT INSTRUCTIONS
Dear Orquidea Mckeon,    Your symptoms show that you may have coronavirus (COVID-19). This illness can cause fever, cough and trouble breathing. Many people get a mild case and get better on their own. Some people can get very sick.    Will I be tested for COVID-19?  We would like to test you for Covid-19 virus. I have placed orders for this test.     To schedule: go to your [employer] home page and scroll down to the section that says  You have an appointment that needs to be scheduled  and click the large green button that says  Schedule Now  and follow the steps to find the next available openings.    If you are unable to complete these [employer] scheduling steps, please call [phone] to schedule your testing.     Return to work/school/ guidance:  Please let your workplace manager and staffing office know when your quarantine ends     We can t give you an exact date as it depends on the above. You can calculate this on your own or work with your manager/staffing office to calculate this. (For example if you were exposed on 10/4, you would have to quarantine for 14 full days. That would be through 10/18. You could return on 10/19.)      If you receive a positive COVID-19 test result, follow the guidance of the those who are giving you the results. Usually the return to work is 10 (or in some cases 20 days from symptom onset.) If you work at [facility], you must also be cleared by Employee Occupational Health and Safety to return to work.        If you receive a negative COVID-19 test result and did not have a high risk exposure to someone with a known positive COVID-19 test, you can return to work once you're free of fever for 24 hours without fever-reducing medication and your symptoms are improving or resolved.      If you receive a negative COVID-19 test and If you had a high risk exposure to someone who has tested positive for COVID-19 then you can return to work 14 days after your last contact  with the positive individual    Note: If you have ongoing exposure to the covid positive person, this quarantine period may be more than 14 days. (For example, if you are continued to be exposed to your child who tested positive and cannot isolate from them, then the quarantine of 7-14 days can't start until your child is no longer contagious. This is typically 10 days from onset of the child's symptoms. So the total duration may be 17-24 days in this case.)    Sign up for [employer].   We know it's scary to hear that you might have COVID-19. We want to track your symptoms to make sure you're okay over the next 2 weeks. Please look for an email from [employer]--this is a free, online program that we'll use to keep in touch. To sign up, follow the link in the email you will receive. Learn more at [email or web address]    How can I take care of myself?    Get lots of rest. Drink extra fluids (unless a doctor has told you not to)    Take Tylenol (acetaminophen) or ibuprofen for fever or pain. If you have liver or kidney problems, ask your family doctor if it's okay to take Tylenol o ibuprofen    If you have other health problems (like cancer, heart failure, an organ transplant or severe kidney disease): Call your specialty clinic if you don't feel better in the next 2 days.    Know when to call 911. Emergency warning signs include:  o Trouble breathing or shortness of breath  o Pain or pressure in the chest that doesn't go away  o Feeling confused like you haven't felt before, or not being able to wake up  o Bluish-colored lips or face    Where can I get more information?  [employer] - About COVID-19:   [email or web address]    CDC - What to Do If You're Sick:   www.cdc.gov/coronavirus/2019-ncov/about/steps-when-sick.html

## 2021-10-08 NOTE — TELEPHONE ENCOUNTER
Triage call:   Called patient to triage upon direction of Dr Kailee Heredia     Tired and nauseated   Hot and cold flashes  Pfizer- first dose - got on Tuesday at 2 pm  symptoms started early Wednesday morning  Has not reacted to an immunization in the past   Intermittent cough that sounds congested as well  Advised that cough would not be associated with the COVID vaccine  Other symptoms would be a suspected systemic vaccine reaction.     Writer advised a virtual visit today- offered either to transfer to scheduling or to have her do an E-visit. Patient agreeable to an evisit. Walked her through how to do a mychart evisit. Patient will do so today.       Reason for Disposition    Typical COVID-19 symptoms (e.g., cough, difficulty breathing, loss of taste or smell, runny nose, sore throat) that are NOT expected from vaccine    [1] COVID-19 infection suspected by caller or triager AND [2] mild symptoms (cough, fever, or others) AND [3] negative COVID-19 rapid test     Has not been tested for COVID- writer assessment indicates possible need for testing and virtual visit with provider.    Additional Information    Negative: [1] Difficulty breathing or swallowing AND [2] starts within 2 hours after injection    Negative: Sounds like a life-threatening emergency to the triager    Negative: [1] Symptoms of COVID-19 (e.g., cough, fever, SOB, or others) AND [2] within 14 days of EXPOSURE (close contact) with diagnosed or suspected COVID-19 patient    Negative: [1] Symptoms of COVID-19 (e.g., cough, fever, SOB, or others) AND [2] within 14 days of being at a crowded indoor or outdoor event (e.g., concert, festival, rally, wedding)    Negative: SEVERE difficulty breathing (e.g., struggling for each breath, speaks in single words)    Negative: Difficult to awaken or acting confused (e.g., disoriented, slurred speech)    Negative: Bluish (or gray) lips or face now    Negative: Shock suspected (e.g., cold/pale/clammy skin, too  weak to stand, low BP, rapid pulse)    Negative: Sounds like a life-threatening emergency to the triager    Negative: [1] COVID-19 exposure AND [2] no symptoms    Negative: COVID-19 vaccine reaction suspected (e.g., fever, headache, muscle aches) occurring 1 to 3 days after getting vaccine    Negative: COVID-19 vaccine, questions about    Negative: [1] Lives with someone known to have influenza (flu test positive) AND [2] flu-like symptoms (e.g., cough, runny nose, sore throat, SOB; with or without fever)    Negative: [1] Adult with possible COVID-19 symptoms AND [2] triager concerned about severity of symptoms or other causes    Negative: COVID-19 and breastfeeding, questions about    Negative: SEVERE or constant chest pain or pressure (Exception: mild central chest pain, present only when coughing)    Negative: MODERATE difficulty breathing (e.g., speaks in phrases, SOB even at rest, pulse 100-120)    Negative: [1] Headache AND [2] stiff neck (can't touch chin to chest)    Negative: MILD difficulty breathing (e.g., minimal/no SOB at rest, SOB with walking, pulse <100)    Negative: Chest pain or pressure    Negative: Patient sounds very sick or weak to the triager    Negative: Fever > 103 F (39.4 C)    Negative: [1] Fever > 101 F (38.3 C) AND [2] age > 60 years    Negative: [1] Fever > 100.0 F (37.8 C) AND [2] bedridden (e.g., nursing home patient, CVA, chronic illness, recovering from surgery)    Negative: HIGH RISK for severe COVID complications (e.g., age > 64 years, obesity with BMI > 25, pregnant, chronic lung disease or other chronic medical condition)  (Exception: Already seen by PCP and no new or worsening symptoms.)    Negative: [1] HIGH RISK patient AND [2] influenza is widespread in the community AND [3] ONE OR MORE respiratory symptoms: cough, sore throat, runny or stuffy nose    Negative: [1] HIGH RISK patient AND [2] influenza exposure within the last 7 days AND [3] ONE OR MORE respiratory symptoms:  cough, sore throat, runny or stuffy nose    Protocols used: CORONAVIRUS (COVID-19) VACCINE QUESTIONS AND FPHLEMVLA-A-OG 8.25.2021, CORONAVIRUS (COVID-19) DIAGNOSED OR JIMAJCGIC-H-VL 8.25.2021

## 2021-10-08 NOTE — TELEPHONE ENCOUNTER
Called patient and triaged symptoms. See triage encounter for additional information.   Helen Gamboa RN BSN 10/8/2021 10:51 AM

## 2021-10-15 ENCOUNTER — OFFICE VISIT (OUTPATIENT)
Dept: FAMILY MEDICINE | Facility: CLINIC | Age: 41
End: 2021-10-15
Payer: COMMERCIAL

## 2021-10-15 VITALS
DIASTOLIC BLOOD PRESSURE: 70 MMHG | HEART RATE: 69 BPM | HEIGHT: 61 IN | SYSTOLIC BLOOD PRESSURE: 102 MMHG | BODY MASS INDEX: 22.84 KG/M2 | OXYGEN SATURATION: 97 % | WEIGHT: 121 LBS

## 2021-10-15 DIAGNOSIS — R45.4 IRRITABILITY: Primary | ICD-10-CM

## 2021-10-15 DIAGNOSIS — R53.83 OTHER FATIGUE: ICD-10-CM

## 2021-10-15 DIAGNOSIS — D18.03 HEMANGIOMA OF LIVER: ICD-10-CM

## 2021-10-15 DIAGNOSIS — Z12.31 ENCOUNTER FOR SCREENING MAMMOGRAM FOR BREAST CANCER: ICD-10-CM

## 2021-10-15 DIAGNOSIS — Z00.00 HEALTH CARE MAINTENANCE: ICD-10-CM

## 2021-10-15 LAB
ALBUMIN SERPL-MCNC: 4.1 G/DL (ref 3.5–5)
ALP SERPL-CCNC: 43 U/L (ref 45–120)
ALT SERPL W P-5'-P-CCNC: 16 U/L (ref 0–45)
ANION GAP SERPL CALCULATED.3IONS-SCNC: 10 MMOL/L (ref 5–18)
AST SERPL W P-5'-P-CCNC: 17 U/L (ref 0–40)
BILIRUB SERPL-MCNC: 0.4 MG/DL (ref 0–1)
BUN SERPL-MCNC: 8 MG/DL (ref 8–22)
CALCIUM SERPL-MCNC: 9.4 MG/DL (ref 8.5–10.5)
CHLORIDE BLD-SCNC: 107 MMOL/L (ref 98–107)
CHOLEST SERPL-MCNC: 136 MG/DL
CO2 SERPL-SCNC: 23 MMOL/L (ref 22–31)
CREAT SERPL-MCNC: 0.86 MG/DL (ref 0.6–1.1)
FASTING STATUS PATIENT QL REPORTED: ABNORMAL
GFR SERPL CREATININE-BSD FRML MDRD: 84 ML/MIN/1.73M2
GLUCOSE BLD-MCNC: 99 MG/DL (ref 70–125)
HDLC SERPL-MCNC: 45 MG/DL
LDLC SERPL CALC-MCNC: 75 MG/DL
POTASSIUM BLD-SCNC: 3.8 MMOL/L (ref 3.5–5)
PROT SERPL-MCNC: 6.5 G/DL (ref 6–8)
SODIUM SERPL-SCNC: 140 MMOL/L (ref 136–145)
TRIGL SERPL-MCNC: 78 MG/DL
TSH SERPL DL<=0.005 MIU/L-ACNC: 1.78 UIU/ML (ref 0.3–5)

## 2021-10-15 PROCEDURE — 80061 LIPID PANEL: CPT | Performed by: FAMILY MEDICINE

## 2021-10-15 PROCEDURE — 99396 PREV VISIT EST AGE 40-64: CPT | Mod: 25 | Performed by: FAMILY MEDICINE

## 2021-10-15 PROCEDURE — 90471 IMMUNIZATION ADMIN: CPT | Performed by: FAMILY MEDICINE

## 2021-10-15 PROCEDURE — 80053 COMPREHEN METABOLIC PANEL: CPT | Performed by: FAMILY MEDICINE

## 2021-10-15 PROCEDURE — 36415 COLL VENOUS BLD VENIPUNCTURE: CPT | Performed by: FAMILY MEDICINE

## 2021-10-15 PROCEDURE — 90686 IIV4 VACC NO PRSV 0.5 ML IM: CPT | Performed by: FAMILY MEDICINE

## 2021-10-15 PROCEDURE — 84443 ASSAY THYROID STIM HORMONE: CPT | Performed by: FAMILY MEDICINE

## 2021-10-15 RX ORDER — DESVENLAFAXINE 25 MG/1
25 TABLET, EXTENDED RELEASE ORAL DAILY
Qty: 90 TABLET | Refills: 3 | Status: SHIPPED | OUTPATIENT
Start: 2021-10-15 | End: 2023-02-02

## 2021-10-15 RX ORDER — DIPHENHYDRAMINE HCL 25 MG
25 CAPSULE ORAL EVERY 6 HOURS PRN
Qty: 20 CAPSULE | Refills: 0 | Status: SHIPPED | OUTPATIENT
Start: 2021-10-15 | End: 2022-09-27

## 2021-10-15 ASSESSMENT — MIFFLIN-ST. JEOR: SCORE: 1142.26

## 2021-10-15 ASSESSMENT — PATIENT HEALTH QUESTIONNAIRE - PHQ9: SUM OF ALL RESPONSES TO PHQ QUESTIONS 1-9: 8

## 2021-10-15 NOTE — PROGRESS NOTES
Assessment/Plan:     Patient presents today for routine physical examination.    Healthcare Maintenance: USPSTF recommendations for age 41;    Patient has been counseled on/screened for:  - the benefits of supplemental folic acid   - intimate partner violence and there are no concerns at this time  - a healthful diet and physical activity for CVD prevention  - Diabetes and hyperlipidemia: screening was performed.   Sexually transmitted infections: Patient would not like to be screened for chlamydia, gonorrhea, syphilis, HIV, and hepatitis  Immunizations: up to date except for influenza which was recommended  Cervical Cancer Screening: patient has been screened for cervical cancer per protocol in 2018, results were negative with negative HPV, due in 2023  Mammogram: ordered today      Additional concerns are as detailed below:    1. Irritability  Patient is doing remarkably well on this medication desires no increase in dose.  She will reach out to me if she does desire an increase we can prescribe Zofran at the same time.  - desvenlafaxine succinate (PRISTIQ) 25 MG 24 hr tablet; Take 1 tablet (25 mg) by mouth daily  Dispense: 90 tablet; Refill: 3    2. Other fatigue  - TSH with free T4 reflex    3. Hemangioma of liver  Patient has a history of an allergy to contrast and will take Benadryl prior to her Eovist MRI of the abdomen.  - diphenhydrAMINE (BENADRYL ALLERGY) 25 MG capsule; Take 1 capsule (25 mg) by mouth every 6 hours as needed for itching or allergies (pre-dosing for mri) Administer 30 min - 2 hours pre - IV contrast injection  Dispense: 20 capsule; Refill: 0  - MR Liver wo & w Contrast; Future    4. Health care maintenance  - Comprehensive metabolic panel (BMP + Alb, Alk Phos, ALT, AST, Total. Bili, TP); Future  - Lipid panel reflex to direct LDL Non-fasting; Future  - Comprehensive metabolic panel (BMP + Alb, Alk Phos, ALT, AST, Total. Bili, TP)  - Lipid panel reflex to direct LDL Non-fasting    5.  "Encounter for screening mammogram for breast cancer  - *MA Screening Digital Bilateral; Future      AVS printed and given to patient.  Return to clinic in 1 year.     I have had an Advance Directives discussion with the patient.    This note has been dictated using voice recognition software. Any grammatical or context distortions are unintentional and inherent to the the software.     Chantel Segovia MD  [facility]    Subjective:     Orquidea Mckeon is a 41 year old female who presents to clinic for routine physical.    Additional concerns include:    1. Patient noted significant side effects when she first started the pristiq. However, now she is not noticing almost any and she is feeling better. She definitely notices a difference.     PHQ-2 Score:  2  PHQ-9 Score: 8    Health Care Directive: discussed    Patient Care Team:   PCP: Chantel Segovia     Past Medical History, Family History, and Social History reviewed.     Review of systems:  Patient endorses: nausea, fatigue  The remainder of the 10 system review is otherwise negative.    Objective:     /70   Pulse 69   Ht 1.543 m (5' 0.75\")   Wt 54.9 kg (121 lb)   SpO2 97%   BMI 23.05 kg/m    Gen: Alert, NAD, appears stated age, normal hygiene   Eyes: conjunctivae without injection, sclera clear, EOMI  ENT/mouth: nares clear, septum midline, absent rhinorrhea,absent pharyngeal injection, neck is supple, no thyroid enlargement  CV: RRR, no murmur appreciated, pedal edema absent bilaterally  Resp: CTAB, no wheezes, rales or ronchi  ABD: normoactive, non-tender to palpation, nondistended  MSK: grossly full range of motion in all joints, no obvious deformity  Neuro: CN II-XII grossly intact, no deficits in coordination  Psych: no apparent hallucinations or delusions, no pressured speech; alert, oriented x3  SKIN: dry and without lesions  Heme/lymph: no pallor, no active bleeding/bruising, no adenopathy appreciated  Breast: " nontender to palpation, no masses appreciated, no nipple discharge, implants present      Medications:  Current Outpatient Medications   Medication     albuterol (PROAIR HFA;PROVENTIL HFA;VENTOLIN HFA) 90 mcg/actuation inhaler     desvenlafaxine succinate (PRISTIQ) 25 MG 24 hr tablet     diphenhydrAMINE (BENADRYL ALLERGY) 25 MG capsule     No current facility-administered medications for this visit.       Allergies:  Allergies   Allergen Reactions     Blood-Group Specific Substance Unknown     Anti-D   present. Expect delays in blood for transfusion.  Draw 2 lavender  for type and screen orders.     Iodinated Contrast Media [Diagnostic X-Ray Materials] Hives       PMH:  Past Medical History:   Diagnosis Date     Hemangioma     location: liver       PSH:  Past Surgical History:   Procedure Laterality Date     APPENDECTOMY       BREAST SURGERY       C LIGATE FALLOPIAN TUBE      Description: Tubal Ligation;  Recorded: 06/27/2013;  Comments: for sterilization on 7/09     LAPAROSCOPIC ASSISTED HYSTERECTOMY VAGINAL N/A 8/20/2020    Procedure: LAPAROSCOPIC ASSISTED VAGINAL HYSTERECTOMY BILATERAL SALPINGECTOMY CYSTOSCOPY;  Surgeon: Chelsea Mitchell MD;  [facility];  Service: Gynecology       Family Hx:  Family History   Problem Relation Age of Onset     Hemangiomas Sister      Hemangiomas Brother      Breast Cancer Maternal Grandmother      Kidney Disease Maternal Grandfather      Alcoholism Maternal Grandfather      Breast Cancer Maternal Aunt        Social History:  Social History     Socioeconomic History     Marital status:      Spouse name: Not on file     Number of children: Not on file     Years of education: Not on file     Highest education level: Not on file   Occupational History     Not on file   Tobacco Use     Smoking status: Former Smoker     Quit date: 3/4/2006     Years since quitting: 15.6     Smokeless tobacco: Never Used   Substance and Sexual Activity     Alcohol use: Yes      Alcohol/week: 1.0 standard drinks     Comment: rare     Drug use: No     Sexual activity: Yes     Partners: Male     Birth control/protection: Surgical   Other Topics Concern     Not on file   Social History Narrative     Not on file     Social Determinants of Health     Financial Resource Strain:      Difficulty of Paying Living Expenses:    Food Insecurity:      Worried About Running Out of Food in the Last Year:      Ran Out of Food in the Last Year:    Transportation Needs:      Lack of Transportation (Medical):      Lack of Transportation (Non-Medical):    Physical Activity:      Days of Exercise per Week:      Minutes of Exercise per Session:    Stress:      Feeling of Stress :    Social Connections:      Frequency of Communication with Friends and Family:      Frequency of Social Gatherings with Friends and Family:      Attends Christianity Services:      Active Member of Clubs or Organizations:      Attends Club or Organization Meetings:      Marital Status:    Intimate Partner Violence:      Fear of Current or Ex-Partner:      Emotionally Abused:      Physically Abused:      Sexually Abused:        No components found for: LDLCALC     Immunization History   Administered Date(s) Administered     COVID-19,PF,Pfizer 10/05/2021     Flu, Unspecified 10/30/2015, 10/20/2016, 11/15/2017, 11/11/2018, 05/09/2019     Influenza Vaccine IM > 6 months Valent IIV4 (Alfuria,Fluzone) 10/14/2014, 10/09/2018, 10/15/2021     Influenza Vaccine, 6+MO IM (QUADRIVALENT W/PRESERVATIVES) 12/12/2011     Td (Adult), Adsorbed 11/19/1998     Td,adult,historic,unspecified 11/19/1998     Tdap (Adacel,Boostrix) 12/12/2011         Answers for HPI/ROS submitted by the patient on 10/15/2021  Frequency of exercise:: 2-3 days/week  Getting at least 3 servings of Calcium per day:: Yes  Diet:: Breakfast skipped  Taking medications regularly:: Yes  Medication side effects:: Other  Bi-annual eye exam:: NO  Dental care twice a year:: Yes  Sleep  apnea or symptoms of sleep apnea:: Daytime drowsiness  Additional concerns today:: Yes  Duration of exercise:: 15-30 minutes

## 2021-10-27 ENCOUNTER — IMMUNIZATION (OUTPATIENT)
Dept: NURSING | Facility: CLINIC | Age: 41
End: 2021-10-27
Attending: PEDIATRICS
Payer: COMMERCIAL

## 2021-10-27 PROCEDURE — 0002A PR COVID VAC PFIZER DIL RECON 30 MCG/0.3 ML IM: CPT

## 2021-10-27 PROCEDURE — 91300 PR COVID VAC PFIZER DIL RECON 30 MCG/0.3 ML IM: CPT

## 2021-12-16 ENCOUNTER — HOSPITAL ENCOUNTER (OUTPATIENT)
Dept: MAMMOGRAPHY | Facility: CLINIC | Age: 41
Discharge: HOME | End: 2021-12-16
Attending: FAMILY MEDICINE | Admitting: FAMILY MEDICINE
Payer: COMMERCIAL

## 2021-12-16 DIAGNOSIS — Z12.31 ENCOUNTER FOR SCREENING MAMMOGRAM FOR BREAST CANCER: ICD-10-CM

## 2021-12-16 PROCEDURE — 77067 SCR MAMMO BI INCL CAD: CPT

## 2021-12-23 ENCOUNTER — HOSPITAL ENCOUNTER (OUTPATIENT)
Dept: MAMMOGRAPHY | Facility: CLINIC | Age: 41
End: 2021-12-23
Attending: FAMILY MEDICINE
Payer: COMMERCIAL

## 2021-12-23 ENCOUNTER — HOSPITAL ENCOUNTER (OUTPATIENT)
Dept: ULTRASOUND IMAGING | Facility: CLINIC | Age: 41
End: 2021-12-23
Attending: FAMILY MEDICINE
Payer: COMMERCIAL

## 2021-12-23 DIAGNOSIS — N64.89 BREAST ASYMMETRY: ICD-10-CM

## 2021-12-23 PROCEDURE — 77061 BREAST TOMOSYNTHESIS UNI: CPT | Mod: LT

## 2021-12-23 PROCEDURE — 76642 ULTRASOUND BREAST LIMITED: CPT | Mod: LT

## 2022-03-04 ENCOUNTER — MYC MEDICAL ADVICE (OUTPATIENT)
Dept: FAMILY MEDICINE | Facility: CLINIC | Age: 42
End: 2022-03-04
Payer: COMMERCIAL

## 2022-03-04 DIAGNOSIS — L30.8 OTHER ECZEMA: Primary | ICD-10-CM

## 2022-03-04 RX ORDER — KETOCONAZOLE 20 MG/G
CREAM TOPICAL DAILY
Qty: 30 G | Refills: 1 | Status: SHIPPED | OUTPATIENT
Start: 2022-03-04

## 2022-09-25 ENCOUNTER — HEALTH MAINTENANCE LETTER (OUTPATIENT)
Age: 42
End: 2022-09-25

## 2022-09-27 ENCOUNTER — OFFICE VISIT (OUTPATIENT)
Dept: FAMILY MEDICINE | Facility: CLINIC | Age: 42
End: 2022-09-27
Payer: COMMERCIAL

## 2022-09-27 VITALS
OXYGEN SATURATION: 98 % | BODY MASS INDEX: 24.77 KG/M2 | WEIGHT: 130 LBS | SYSTOLIC BLOOD PRESSURE: 100 MMHG | HEART RATE: 69 BPM | DIASTOLIC BLOOD PRESSURE: 60 MMHG

## 2022-09-27 DIAGNOSIS — R05.9 COUGH: ICD-10-CM

## 2022-09-27 DIAGNOSIS — R42 DIZZINESS: Primary | ICD-10-CM

## 2022-09-27 DIAGNOSIS — F43.9 STRESS: ICD-10-CM

## 2022-09-27 PROCEDURE — 99214 OFFICE O/P EST MOD 30 MIN: CPT | Performed by: FAMILY MEDICINE

## 2022-09-27 RX ORDER — MECLIZINE HYDROCHLORIDE 25 MG/1
TABLET ORAL
COMMUNITY
Start: 2022-08-22 | End: 2022-09-27

## 2022-09-27 RX ORDER — METHYLPREDNISOLONE 4 MG
TABLET, DOSE PACK ORAL
Qty: 21 TABLET | Refills: 0 | Status: SHIPPED | OUTPATIENT
Start: 2022-09-27 | End: 2022-12-19

## 2022-09-27 ASSESSMENT — PAIN SCALES - GENERAL: PAINLEVEL: NO PAIN (0)

## 2022-09-27 NOTE — PROGRESS NOTES
Assessment/Plan:    Dizziness  Dizziness question eustachian tube dysfunction following recent air travel to [place].  Medrol Dosepak was prescribed.  Meclizine can be utilized on as-needed basis but not true vertigo described.  - methylPREDNISolone (MEDROL DOSEPAK) 4 MG tablet therapy pack  Dispense: 21 tablet; Refill: 0    Cough  Recent cough.  Likely viral URI etiology.  Monitor for symptomatic worsening.  Routine COVID monitoring ongoing.    Stress  Stress history.  Utilizing Pristiq 25 mg daily with described benefit historically over the past year.          Subjective:    Orquidea Mckeon is seen today for follow-up assessment.  Underlying dizziness concerns.  Followed return from [place] by airplane about 1 week prior to August 22 evaluation to urgency room for dizziness.  Head CT without acute findings.  Had been prescribed meclizine which she had utilized.  Initial benefits.  Now some recurrent concerns since Sunday while shopping.  More of an off-balance sensation.  Her sister has cerebral hemangioma described.  Patient has had recent cough as well.  Utilizing metered-dose inhaler on as-needed basis.  Vision somewhat hazy with episodes.  Uncertain if seasonal allergy component.  Contrast allergy described.  Using Pristiq 25 mg daily and continues low-dose over past year for stress reaction, improved.  Comprehensive review of systems as above otherwise all negative.     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  Hobbies:  working out, outdoors, crafting (photos and putting together photo books, etc.)      Past Surgical History:   Procedure Laterality Date     APPENDECTOMY       BREAST SURGERY       LAPAROSCOPIC ASSISTED HYSTERECTOMY VAGINAL  N/A 2020    Procedure: LAPAROSCOPIC ASSISTED VAGINAL HYSTERECTOMY BILATERAL SALPINGECTOMY CYSTOSCOPY;  Surgeon: Chelsea Mitchell MD;  Location: [facility] [place];  Service: Gynecology     [facility] LIGATE FALLOPIAN TUBE      Description: Tubal Ligation;  Recorded: 2013;  Comments: for sterilization on         Family History   Problem Relation Age of Onset     Hemangiomas Sister      Hemangiomas Brother      Breast Cancer Maternal Grandmother      Kidney Disease Maternal Grandfather      Alcoholism Maternal Grandfather      Breast Cancer Maternal Aunt         Past Medical History:   Diagnosis Date     Hemangioma     location: liver        Social History     Tobacco Use     Smoking status: Former Smoker     Quit date: 3/4/2006     Years since quittin.5     Smokeless tobacco: Never Used   Substance Use Topics     Alcohol use: Yes     Alcohol/week: 1.0 standard drink     Comment: rare     Drug use: No        Current Outpatient Medications   Medication Sig Dispense Refill     albuterol (PROAIR HFA/PROVENTIL HFA/VENTOLIN HFA) 108 (90 Base) MCG/ACT inhaler Inhale 1-2 puffs into the lungs every 4 hours as needed for shortness of breath / dyspnea or wheezing 8.5 g 0     desvenlafaxine succinate (PRISTIQ) 25 MG 24 hr tablet Take 1 tablet (25 mg) by mouth daily 90 tablet 3     ketoconazole (NIZORAL) 2 % external cream Apply topically daily 30 g 1     methylPREDNISolone (MEDROL DOSEPAK) 4 MG tablet therapy pack Follow Package Directions 21 tablet 0     diphenhydrAMINE (BENADRYL ALLERGY) 25 MG capsule Take 1 capsule (25 mg) by mouth every 6 hours as needed for itching or allergies (pre-dosing for mri) Administer 30 min - 2 hours pre - IV contrast injection (Patient not taking: Reported on 2022) 20 capsule 0     meclizine (ANTIVERT) 25 MG tablet  (Patient not taking: Reported on 2022)            Objective:    Vitals:    22 1407   BP: 100/60   Pulse: 69   SpO2: 98%   Weight: 59 kg (130 lb)       Body mass index is 24.77 kg/m .    Alert.  No apparent distress.  External auditory canals appear normal with normal tympanic membrane.  No TM perforation.  Nasopharynx without significant congestion.  Oropharynx normal without postnasal drainage.  Neck supple without significant cervical lymphadenopathy.  Chest clear currently without expiratory wheeze or inspiratory crackle.  Symmetric expansion.  Cardiac exam regular.  Neurologic exam nonfocal without pronator drift.  Negative Romberg testing.  Heel toe walk normal.  Distal CMS intact upper and lower extremities.      CT scan 8/22/22 Impression    1.  No CT findings of acute intracranial process.        This note has been dictated using voice recognition software and as a result may contain minor grammatical errors and unintended word substitutions.

## 2022-12-05 ENCOUNTER — MYC MEDICAL ADVICE (OUTPATIENT)
Dept: FAMILY MEDICINE | Facility: CLINIC | Age: 42
End: 2022-12-05

## 2022-12-19 ENCOUNTER — OFFICE VISIT (OUTPATIENT)
Dept: FAMILY MEDICINE | Facility: CLINIC | Age: 42
End: 2022-12-19
Payer: COMMERCIAL

## 2022-12-19 VITALS
OXYGEN SATURATION: 98 % | BODY MASS INDEX: 25.81 KG/M2 | HEART RATE: 80 BPM | DIASTOLIC BLOOD PRESSURE: 64 MMHG | WEIGHT: 135.5 LBS | TEMPERATURE: 97.9 F | SYSTOLIC BLOOD PRESSURE: 106 MMHG

## 2022-12-19 DIAGNOSIS — Z02.89 ENCOUNTER FOR COMPLETION OF FORM WITH PATIENT: Primary | ICD-10-CM

## 2022-12-19 DIAGNOSIS — R42 DIZZINESS: ICD-10-CM

## 2022-12-19 DIAGNOSIS — R42 LIGHTHEADEDNESS: ICD-10-CM

## 2022-12-19 DIAGNOSIS — T50.8X5D ADVERSE EFFECT OF CONTRAST MEDIA, SUBSEQUENT ENCOUNTER: ICD-10-CM

## 2022-12-19 LAB
ALBUMIN SERPL BCG-MCNC: 4.5 G/DL (ref 3.5–5.2)
ALP SERPL-CCNC: 46 U/L (ref 35–104)
ALT SERPL W P-5'-P-CCNC: 21 U/L (ref 10–35)
ANION GAP SERPL CALCULATED.3IONS-SCNC: 10 MMOL/L (ref 7–15)
AST SERPL W P-5'-P-CCNC: 25 U/L (ref 10–35)
BASOPHILS # BLD AUTO: 0 10E3/UL (ref 0–0.2)
BASOPHILS NFR BLD AUTO: 1 %
BILIRUB SERPL-MCNC: 0.3 MG/DL
BUN SERPL-MCNC: 12.2 MG/DL (ref 6–20)
CALCIUM SERPL-MCNC: 9.3 MG/DL (ref 8.6–10)
CHLORIDE SERPL-SCNC: 106 MMOL/L (ref 98–107)
CREAT SERPL-MCNC: 0.9 MG/DL (ref 0.51–0.95)
DEPRECATED HCO3 PLAS-SCNC: 26 MMOL/L (ref 22–29)
EOSINOPHIL # BLD AUTO: 0.2 10E3/UL (ref 0–0.7)
EOSINOPHIL NFR BLD AUTO: 4 %
ERYTHROCYTE [DISTWIDTH] IN BLOOD BY AUTOMATED COUNT: 12.4 % (ref 10–15)
GFR SERPL CREATININE-BSD FRML MDRD: 81 ML/MIN/1.73M2
GLUCOSE SERPL-MCNC: 114 MG/DL (ref 70–99)
HCT VFR BLD AUTO: 37.5 % (ref 35–47)
HGB BLD-MCNC: 13.6 G/DL (ref 11.7–15.7)
IMM GRANULOCYTES # BLD: 0 10E3/UL
IMM GRANULOCYTES NFR BLD: 0 %
LYMPHOCYTES # BLD AUTO: 1.7 10E3/UL (ref 0.8–5.3)
LYMPHOCYTES NFR BLD AUTO: 34 %
MCH RBC QN AUTO: 30.3 PG (ref 26.5–33)
MCHC RBC AUTO-ENTMCNC: 36.3 G/DL (ref 31.5–36.5)
MCV RBC AUTO: 84 FL (ref 78–100)
MONOCYTES # BLD AUTO: 0.3 10E3/UL (ref 0–1.3)
MONOCYTES NFR BLD AUTO: 6 %
NEUTROPHILS # BLD AUTO: 2.8 10E3/UL (ref 1.6–8.3)
NEUTROPHILS NFR BLD AUTO: 56 %
PLATELET # BLD AUTO: 202 10E3/UL (ref 150–450)
POTASSIUM SERPL-SCNC: 4.4 MMOL/L (ref 3.4–5.3)
PROT SERPL-MCNC: 6.4 G/DL (ref 6.4–8.3)
RBC # BLD AUTO: 4.49 10E6/UL (ref 3.8–5.2)
SODIUM SERPL-SCNC: 142 MMOL/L (ref 136–145)
TSH SERPL DL<=0.005 MIU/L-ACNC: 2.1 UIU/ML (ref 0.3–4.2)
WBC # BLD AUTO: 5 10E3/UL (ref 4–11)

## 2022-12-19 PROCEDURE — 36415 COLL VENOUS BLD VENIPUNCTURE: CPT | Performed by: FAMILY MEDICINE

## 2022-12-19 PROCEDURE — 80050 GENERAL HEALTH PANEL: CPT | Performed by: FAMILY MEDICINE

## 2022-12-19 PROCEDURE — 99214 OFFICE O/P EST MOD 30 MIN: CPT | Performed by: FAMILY MEDICINE

## 2022-12-19 RX ORDER — DIPHENHYDRAMINE HCL 25 MG
CAPSULE ORAL
Qty: 2 CAPSULE | Refills: 0 | Status: SHIPPED | OUTPATIENT
Start: 2022-12-19

## 2022-12-19 RX ORDER — MECLIZINE HYDROCHLORIDE 25 MG/1
25 TABLET ORAL 3 TIMES DAILY PRN
Qty: 30 TABLET | Refills: 1 | Status: SHIPPED | OUTPATIENT
Start: 2022-12-19 | End: 2024-02-02

## 2022-12-19 ASSESSMENT — PAIN SCALES - GENERAL: PAINLEVEL: MILD PAIN (2)

## 2022-12-19 NOTE — PROGRESS NOTES
Assessment & Plan     Encounter for completion of form with patient  FMLA paperwork filled out with the patient today.  I did give a limitation of 6 months for reassessment.  This should allow her to get in with neurology and complete any needed work-up.  Could also have PT or OT started by this point if appropriate.    Lightheadedness  Dizziness  Patient does not describe classic vertigo symptoms, but did feel that she benefited a bit from meclizine.  This was refilled for her today.  Recommended MRI of the brain and further evaluation and referral to neurology was placed.  Below lab work obtained today and normal.  - MR Brain w/o & w Contrast; Future  - Adult Neurology  Referral; Future  - meclizine (ANTIVERT) 25 MG tablet; Take 1 tablet (25 mg) by mouth 3 times daily as needed for dizziness  - Comprehensive metabolic panel (BMP + Alb, Alk Phos, ALT, AST, Total. Bili, TP)  - CBC with platelets and differential  - TSH with free T4 reflex    Adverse effect of contrast media, subsequent encounter  Patient has a contrast allergy.  She has done well with pretreatment with only Benadryl in the past.  - diphenhydrAMINE (BENADRYL ALLERGY) 25 MG capsule; Administer 30 min - 2 hours pre - IV contrast injection                 Return in about 4 weeks (around 1/16/2023) for Routine preventive.    Shabana Schultz MD  [facility] 42 [facility], presenting for the following health issues:  Forms      History of Present Illness       Reason for visit:  Vertigo / FMLA PAPERWORK    She eats 2-3 servings of fruits and vegetables daily.She consumes 0 sweetened beverage(s) daily.She exercises with enough effort to increase her heart rate 20 to 29 minutes per day.  She exercises with enough effort to increase her heart rate 4 days per week.   She is taking medications regularly.     Patient presents today to have FMLA paperwork filled out.  She works for [employer],  "primarily at an office position.  She works full-time.  She describes the onset of a \"balance issue\", associated with \"hazy\" vision bilaterally.  When this first presented, in August of this year, she was evaluated at the urgency room on 8/22/2022.  At that time she noticed some left facial sensory changes along with pressure at the back of her head and fatigue.  She notes that she has a family history of \"hemangiomas\", patient herself has 1 in her liver, her sister had a \"brain hemorrhage\" at age 8 related to some sort of vascular lesion.  She has some concerned about this.  At the urgency room, she was recommended to pursue an MRI of the brain but never ended up doing this.  She then saw Dr. Carty here at the [facility] in follow-up after having a plane flight that seemed to exacerbate her symptoms.  Today she feels like the episodes of lightheadedness are becoming more frequent.  She generally feels hazy.  She describes a pressure at the back of her head that traces up to her forehead.  She has a hard time staring at a screen at her job.  She states that sometimes she feels \"confused\", and has some word finding difficulties.  She denies any numbness or tingling of her face at present.  She sometimes feels a plugged sensation of her right ear but denies diminished hearing.  She denies vertigo.  She has no history of migraines.  In September, at her follow-up with Dr. Carty, she was prescribed a Medrol Dosepak.  It is unclear if this was helpful.  She did have a head CT at the emergency room in August which was normal.    Review of Systems   Constitutional, HEENT, cardiovascular, pulmonary, gi and gu systems are negative, except as otherwise noted.      Objective    /64 (BP Location: Left arm, Patient Position: Sitting, Cuff Size: Adult Regular)   Pulse 80   Temp 97.9  F (36.6  C) (Temporal)   Wt 61.5 kg (135 lb 8 oz)   SpO2 98%   BMI 25.81 kg/m    Body mass index is 25.81 kg/m .  Physical Exam "   GENERAL: healthy, alert and no distress  EYES: Eyes grossly normal to inspection, PERRL and conjunctivae and sclerae normal  HENT: ear canals and TM's normal, nose and mouth without ulcers or lesions  NECK: no adenopathy, no asymmetry, masses, or scars and thyroid normal to palpation  RESP: lungs clear to auscultation - no rales, rhonchi or wheezes  CV: regular rate and rhythm, normal S1 S2, no S3 or S4, no murmur, click or rub, no peripheral edema and peripheral pulses strong  MS: no gross musculoskeletal defects noted, no edema  SKIN: no suspicious lesions or rashes  NEURO: Normal strength and tone, mentation intact and speech normal  PSYCH: mentation appears normal, affect normal/bright    Results for orders placed or performed in visit on 12/19/22   Comprehensive metabolic panel (BMP + Alb, Alk Phos, ALT, AST, Total. Bili, TP)     Status: Abnormal   Result Value Ref Range    Sodium 142 136 - 145 mmol/L    Potassium 4.4 3.4 - 5.3 mmol/L    Chloride 106 98 - 107 mmol/L    Carbon Dioxide (CO2) 26 22 - 29 mmol/L    Anion Gap 10 7 - 15 mmol/L    Urea Nitrogen 12.2 6.0 - 20.0 mg/dL    Creatinine 0.90 0.51 - 0.95 mg/dL    Calcium 9.3 8.6 - 10.0 mg/dL    Glucose 114 (H) 70 - 99 mg/dL    Alkaline Phosphatase 46 35 - 104 U/L    AST 25 10 - 35 U/L    ALT 21 10 - 35 U/L    Protein Total 6.4 6.4 - 8.3 g/dL    Albumin 4.5 3.5 - 5.2 g/dL    Bilirubin Total 0.3 <=1.2 mg/dL    GFR Estimate 81 >60 mL/min/1.73m2   TSH with free T4 reflex     Status: Normal   Result Value Ref Range    TSH 2.10 0.30 - 4.20 uIU/mL   CBC with platelets and differential     Status: None   Result Value Ref Range    WBC Count 5.0 4.0 - 11.0 10e3/uL    RBC Count 4.49 3.80 - 5.20 10e6/uL    Hemoglobin 13.6 11.7 - 15.7 g/dL    Hematocrit 37.5 35.0 - 47.0 %    MCV 84 78 - 100 fL    MCH 30.3 26.5 - 33.0 pg    MCHC 36.3 31.5 - 36.5 g/dL    RDW 12.4 10.0 - 15.0 %    Platelet Count 202 150 - 450 10e3/uL    % Neutrophils 56 %    % Lymphocytes 34 %    %  Monocytes 6 %    % Eosinophils 4 %    % Basophils 1 %    % Immature Granulocytes 0 %    Absolute Neutrophils 2.8 1.6 - 8.3 10e3/uL    Absolute Lymphocytes 1.7 0.8 - 5.3 10e3/uL    Absolute Monocytes 0.3 0.0 - 1.3 10e3/uL    Absolute Eosinophils 0.2 0.0 - 0.7 10e3/uL    Absolute Basophils 0.0 0.0 - 0.2 10e3/uL    Absolute Immature Granulocytes 0.0 <=0.4 10e3/uL   CBC with platelets and differential     Status: None    Narrative    The following orders were created for panel order CBC with platelets and differential.  Procedure                               Abnormality         Status                     ---------                               -----------         ------                     CBC with platelets and d...[[id number]]                      Final result                 Please view results for these tests on the individual orders.

## 2022-12-20 PROBLEM — R42 DIZZINESS: Status: ACTIVE | Noted: 2022-12-20

## 2022-12-20 PROBLEM — R42 LIGHTHEADEDNESS: Status: ACTIVE | Noted: 2022-12-20

## 2023-01-03 ENCOUNTER — HOSPITAL ENCOUNTER (OUTPATIENT)
Dept: MRI IMAGING | Facility: CLINIC | Age: 43
Discharge: HOME | End: 2023-01-03
Attending: FAMILY MEDICINE | Admitting: FAMILY MEDICINE
Payer: COMMERCIAL

## 2023-01-03 DIAGNOSIS — R42 DIZZINESS: ICD-10-CM

## 2023-01-03 DIAGNOSIS — R42 LIGHTHEADEDNESS: ICD-10-CM

## 2023-01-03 PROCEDURE — A9585 GADOBUTROL INJECTION: HCPCS | Performed by: FAMILY MEDICINE

## 2023-01-03 PROCEDURE — 255N000002 HC RX 255 OP 636: Performed by: FAMILY MEDICINE

## 2023-01-03 PROCEDURE — 70553 MRI BRAIN STEM W/O & W/DYE: CPT

## 2023-01-03 RX ORDER — GADOBUTROL 604.72 MG/ML
6 INJECTION INTRAVENOUS ONCE
Status: COMPLETED | OUTPATIENT
Start: 2023-01-03 | End: 2023-01-03

## 2023-01-03 RX ADMIN — GADOBUTROL 6 ML: 604.72 INJECTION INTRAVENOUS at 19:11

## 2023-02-04 ENCOUNTER — HEALTH MAINTENANCE LETTER (OUTPATIENT)
Age: 43
End: 2023-02-04

## 2023-05-01 ENCOUNTER — APPOINTMENT (OUTPATIENT)
Dept: CT IMAGING | Facility: CLINIC | Age: 43
End: 2023-05-01
Attending: EMERGENCY MEDICINE
Payer: COMMERCIAL

## 2023-05-01 ENCOUNTER — MYC MEDICAL ADVICE (OUTPATIENT)
Dept: FAMILY MEDICINE | Facility: CLINIC | Age: 43
End: 2023-05-01

## 2023-05-01 ENCOUNTER — HOSPITAL ENCOUNTER (EMERGENCY)
Facility: CLINIC | Age: 43
Discharge: HOME | End: 2023-05-01
Attending: EMERGENCY MEDICINE | Admitting: EMERGENCY MEDICINE
Payer: COMMERCIAL

## 2023-05-01 VITALS
TEMPERATURE: 97.5 F | HEIGHT: 61 IN | SYSTOLIC BLOOD PRESSURE: 110 MMHG | WEIGHT: 125 LBS | RESPIRATION RATE: 16 BRPM | BODY MASS INDEX: 23.6 KG/M2 | DIASTOLIC BLOOD PRESSURE: 56 MMHG | OXYGEN SATURATION: 97 % | HEART RATE: 59 BPM

## 2023-05-01 DIAGNOSIS — R45.4 IRRITABILITY: ICD-10-CM

## 2023-05-01 DIAGNOSIS — K52.9 COLITIS: ICD-10-CM

## 2023-05-01 LAB
ALBUMIN SERPL-MCNC: 3.9 G/DL (ref 3.5–5)
ALBUMIN UR-MCNC: 20 MG/DL
ALP SERPL-CCNC: 46 U/L (ref 45–120)
ALT SERPL W P-5'-P-CCNC: 18 U/L (ref 0–45)
ANION GAP SERPL CALCULATED.3IONS-SCNC: 10 MMOL/L (ref 5–18)
APPEARANCE UR: CLEAR
AST SERPL W P-5'-P-CCNC: 19 U/L (ref 0–40)
BACTERIA #/AREA URNS HPF: ABNORMAL /HPF
BASOPHILS # BLD AUTO: 0 10E3/UL (ref 0–0.2)
BASOPHILS NFR BLD AUTO: 0 %
BILIRUB DIRECT SERPL-MCNC: 0.2 MG/DL
BILIRUB SERPL-MCNC: 0.7 MG/DL (ref 0–1)
BILIRUB UR QL STRIP: NEGATIVE
BUN SERPL-MCNC: 7 MG/DL (ref 8–22)
C REACTIVE PROTEIN LHE: 0.7 MG/DL (ref 0–?)
CALCIUM SERPL-MCNC: 8.9 MG/DL (ref 8.5–10.5)
CHLORIDE BLD-SCNC: 106 MMOL/L (ref 98–107)
CO2 SERPL-SCNC: 24 MMOL/L (ref 22–31)
COLOR UR AUTO: ABNORMAL
CREAT SERPL-MCNC: 0.81 MG/DL (ref 0.6–1.1)
EOSINOPHIL # BLD AUTO: 0.1 10E3/UL (ref 0–0.7)
EOSINOPHIL NFR BLD AUTO: 1 %
ERYTHROCYTE [DISTWIDTH] IN BLOOD BY AUTOMATED COUNT: 12.8 % (ref 10–15)
GFR SERPL CREATININE-BSD FRML MDRD: >90 ML/MIN/1.73M2
GLUCOSE BLD-MCNC: 95 MG/DL (ref 70–125)
GLUCOSE UR STRIP-MCNC: NEGATIVE MG/DL
HCT VFR BLD AUTO: 39.5 % (ref 35–47)
HGB BLD-MCNC: 14 G/DL (ref 11.7–15.7)
HGB UR QL STRIP: NEGATIVE
IMM GRANULOCYTES # BLD: 0 10E3/UL
IMM GRANULOCYTES NFR BLD: 1 %
KETONES UR STRIP-MCNC: NEGATIVE MG/DL
LEUKOCYTE ESTERASE UR QL STRIP: NEGATIVE
LIPASE SERPL-CCNC: 27 U/L (ref 0–52)
LYMPHOCYTES # BLD AUTO: 1.3 10E3/UL (ref 0.8–5.3)
LYMPHOCYTES NFR BLD AUTO: 21 %
MAGNESIUM SERPL-MCNC: 1.9 MG/DL (ref 1.8–2.6)
MCH RBC QN AUTO: 29.6 PG (ref 26.5–33)
MCHC RBC AUTO-ENTMCNC: 35.4 G/DL (ref 31.5–36.5)
MCV RBC AUTO: 84 FL (ref 78–100)
MONOCYTES # BLD AUTO: 0.8 10E3/UL (ref 0–1.3)
MONOCYTES NFR BLD AUTO: 13 %
NEUTROPHILS # BLD AUTO: 4 10E3/UL (ref 1.6–8.3)
NEUTROPHILS NFR BLD AUTO: 64 %
NITRATE UR QL: NEGATIVE
NRBC # BLD AUTO: 0 10E3/UL
NRBC BLD AUTO-RTO: 0 /100
PH UR STRIP: 7.5 [PH] (ref 5–7)
PLATELET # BLD AUTO: 179 10E3/UL (ref 150–450)
POTASSIUM BLD-SCNC: 3.8 MMOL/L (ref 3.5–5)
PROT SERPL-MCNC: 6 G/DL (ref 6–8)
RBC # BLD AUTO: 4.73 10E6/UL (ref 3.8–5.2)
RBC URINE: 0 /HPF
SODIUM SERPL-SCNC: 140 MMOL/L (ref 136–145)
SP GR UR STRIP: 1.01 (ref 1–1.03)
SQUAMOUS EPITHELIAL: 3 /HPF
UROBILINOGEN UR STRIP-MCNC: <2 MG/DL
WBC # BLD AUTO: 6.2 10E3/UL (ref 4–11)
WBC URINE: 0 /HPF

## 2023-05-01 PROCEDURE — 99285 EMERGENCY DEPT VISIT HI MDM: CPT | Mod: 25

## 2023-05-01 PROCEDURE — 96361 HYDRATE IV INFUSION ADD-ON: CPT

## 2023-05-01 PROCEDURE — 96375 TX/PRO/DX INJ NEW DRUG ADDON: CPT

## 2023-05-01 PROCEDURE — 80053 COMPREHEN METABOLIC PANEL: CPT | Performed by: EMERGENCY MEDICINE

## 2023-05-01 PROCEDURE — 82248 BILIRUBIN DIRECT: CPT | Performed by: EMERGENCY MEDICINE

## 2023-05-01 PROCEDURE — 86140 C-REACTIVE PROTEIN: CPT | Performed by: EMERGENCY MEDICINE

## 2023-05-01 PROCEDURE — 96374 THER/PROPH/DIAG INJ IV PUSH: CPT

## 2023-05-01 PROCEDURE — 83735 ASSAY OF MAGNESIUM: CPT | Performed by: EMERGENCY MEDICINE

## 2023-05-01 PROCEDURE — 258N000003 HC RX IP 258 OP 636: Performed by: EMERGENCY MEDICINE

## 2023-05-01 PROCEDURE — 83690 ASSAY OF LIPASE: CPT | Performed by: EMERGENCY MEDICINE

## 2023-05-01 PROCEDURE — 85025 COMPLETE CBC W/AUTO DIFF WBC: CPT | Performed by: EMERGENCY MEDICINE

## 2023-05-01 PROCEDURE — 36415 COLL VENOUS BLD VENIPUNCTURE: CPT | Performed by: EMERGENCY MEDICINE

## 2023-05-01 PROCEDURE — 81001 URINALYSIS AUTO W/SCOPE: CPT | Performed by: EMERGENCY MEDICINE

## 2023-05-01 PROCEDURE — 74176 CT ABD & PELVIS W/O CONTRAST: CPT

## 2023-05-01 PROCEDURE — 250N000011 HC RX IP 250 OP 636: Performed by: EMERGENCY MEDICINE

## 2023-05-01 RX ORDER — DICYCLOMINE HCL 20 MG
20 TABLET ORAL 4 TIMES DAILY PRN
Qty: 20 TABLET | Refills: 0 | Status: SHIPPED | OUTPATIENT
Start: 2023-05-01

## 2023-05-01 RX ORDER — ONDANSETRON 2 MG/ML
4 INJECTION INTRAMUSCULAR; INTRAVENOUS ONCE
Status: COMPLETED | OUTPATIENT
Start: 2023-05-01 | End: 2023-05-01

## 2023-05-01 RX ORDER — ONDANSETRON 4 MG/1
4 TABLET, ORALLY DISINTEGRATING ORAL EVERY 8 HOURS PRN
Qty: 20 TABLET | Refills: 0 | Status: SHIPPED | OUTPATIENT
Start: 2023-05-01 | End: 2024-02-02

## 2023-05-01 RX ADMIN — FAMOTIDINE 20 MG: 10 INJECTION, SOLUTION INTRAVENOUS at 09:09

## 2023-05-01 RX ADMIN — SODIUM CHLORIDE, POTASSIUM CHLORIDE, SODIUM LACTATE AND CALCIUM CHLORIDE 1000 ML: 600; 310; 30; 20 INJECTION, SOLUTION INTRAVENOUS at 09:09

## 2023-05-01 RX ADMIN — ONDANSETRON 4 MG: 2 INJECTION INTRAMUSCULAR; INTRAVENOUS at 09:09

## 2023-05-01 ASSESSMENT — ENCOUNTER SYMPTOMS
RHINORRHEA: 0
COUGH: 0
VOMITING: 0
ABDOMINAL DISTENTION: 1
ABDOMINAL PAIN: 1
DYSURIA: 0
CHILLS: 1
DIAPHORESIS: 1
MYALGIAS: 0
BLOOD IN STOOL: 1
DIARRHEA: 1
NAUSEA: 1

## 2023-05-01 ASSESSMENT — ACTIVITIES OF DAILY LIVING (ADL): ADLS_ACUITY_SCORE: 33

## 2023-05-01 NOTE — DISCHARGE INSTRUCTIONS
As needed for pain control at home, take:  - over-the-counter ibuprofen 800mg by mouth every 8 hours (max dose 2400mg in 24 hours)  - over-the-counter acetaminophen 1g by mouth every 6 hours (max dose 4g in 24 hours)  - prescribed Bentyl for abdominal pain    Use the Zofran for any nausea or vomiting.    Drink plenty of fluids to stay hydrated.    Follow up with your Primary Care provider in 2 days for a recheck.    Return to the Emergency Department for any persistent vomiting, severe worsening, or any other concerns.

## 2023-05-01 NOTE — ED PROVIDER NOTES
EMERGENCY DEPARTMENT ENCOUNTER      NAME: Orquidea Mckeon  AGE: 42 year old female  YOB: 1980  MRN: [id number]  EVALUATION DATE & TIME: No admission date for patient encounter.    PCP: No Ref-Primary, Physician    ED PROVIDER: Patricio Deal M.D.      Chief Complaint   Patient presents with     Abdominal Pain         IMPRESSION  1. Colitis        PLAN  - NSAIDs, Bentyl, Zofran for home  - close PCP follow up  - discharge to home    ED COURSE & MEDICAL DECISION MAKING    ED Course as of 05/01/23 1534   Mon May 01, 2023   0857 42yoF with history of GERD, appendectomy, hysterectomy presenting for evaluation of abdominal pain, bloating, nausea, & diarrhea. Reports started 3 days ago with mild diffuse abdominal pain & bloating; ongoing over the weekend with mild diarrhea, subjective fevers, chills, loss of appetite. Able to eat yesterday but no desire to this morning; NPO since yesterday; no vomiting. No one else sick at home. Denies hospital of similar. Denies alcohol use.    Normal vitals on presentation. Uncomfortable on exam (standing at bedside hunched over) with clear lungs, normal work of breathing, no CVA tenderness, very mild diffuse abdominal tenderness & bloating with no peritoneal signs, mild dry mucous membranes, normal neuro exam.    Doubt acute aortic syndrome. Appears more uncomfortable on exam than abdominal exam reveals. SBO needs ruling out. Will obtain CT, blood, urine while giving IVF, Zofran, Pepcid for symptoms. Patient comfortable with this plan; no further questions at this time.   1024 CT reveals colitis; no intraabdominal abscess, other acute process. Unchanged liver hemangioma noted as well; doubt contributory to presentation today. Labs reassuring with negative CRP (doubt IBD), no leukocytosis, no anemia, no DAYANA, no glaring electrolyte abnormality, normal bilirubin/LFTs/lipase. Patient asymptomatic on recheck; has no abdominal tenderness on exam. Patient able to tolerate PO  and walk without difficulty. Has nonspecific colitis; doubt bacterial cause warranting antibiotics at this time. Patient comfortable with discharge at this time. Return precautions and need for PCP follow up discussed and understood. No further questions at the time of discharge.       --------------------------------------------------------------------------------   --------------------------------------------------------------------------------     8:46 AM I met with the patient for the initial interview and physical examination. Discussed plan for treatment and workup in the ED.      This patient involved a high degree of complexity in medical decision making, as significant risks were present and assessed. Recent encounters & results in medical record reviewed by me.    All workup (i.e. any EKG/labs/imaging as per charting below) reviewed and independently interpreted by me. See respective sections for details.    Broad differential considered for this patient presenting, including but not limited to:  Colitis, intraabdominal abscess, diverticulitis, perforated viscus, omental infarction, epiploic appendagitis, SBO, pyelo, UTI, sepsis, acute aortic syndrome    I wore the following PPE during this patient encounter:  N95 mask, face shield w/ eye protection, gloves    See additional MDM below if interested.      MEDICATIONS GIVEN IN THE EMERGENCY DEPARTMENT  Medications   lactated ringers BOLUS 1,000 mL (0 mLs Intravenous Stopped 5/1/23 1027)   ondansetron (ZOFRAN) injection 4 mg (4 mg Intravenous $Given 5/1/23 0909)   famotidine (PEPCID) injection 20 mg (20 mg Intravenous $Given 5/1/23 0909)       NEW PRESCRIPTIONS STARTED AT TODAY'S ER VISIT  Discharge Medication List as of 5/1/2023 10:28 AM      START taking these medications    Details   dicyclomine (BENTYL) 20 MG tablet Take 1 tablet (20 mg) by mouth 4 times daily as needed (abdominal pain), Disp-20 tablet, R-0, E-Prescribe      ondansetron (ZOFRAN ODT) 4 MG  ODT tab Take 1 tablet (4 mg) by mouth every 8 hours as needed for nausea, Disp-20 tablet, R-0, E-Prescribe         CONTINUE these medications which have NOT CHANGED    Details   albuterol (PROAIR HFA/PROVENTIL HFA/VENTOLIN HFA) 108 (90 Base) MCG/ACT inhaler Inhale 1-2 puffs into the lungs every 4 hours as needed for shortness of breath / dyspnea or wheezing, Disp-8.5 g, R-0, E-PrescribePharmacy may dispense brand covered by insurance (Proair, or proventil or ventolin or generic albuterol inhaler)      desvenlafaxine succinate (PRISTIQ) 25 MG 24 hr tablet TAKE 1 TABLET(25 MG) BY MOUTH DAILY, Disp-90 tablet, R-0, E-Prescribe      diphenhydrAMINE (BENADRYL ALLERGY) 25 MG capsule Administer 30 min - 2 hours pre - IV contrast injection, Disp-2 capsule, R-0, E-Prescribe      ketoconazole (NIZORAL) 2 % external cream Apply topically dailyDisp-30 g, Q-5M-Spciptnsj      meclizine (ANTIVERT) 25 MG tablet Take 1 tablet (25 mg) by mouth 3 times daily as needed for dizziness, Disp-30 tablet, R-1, E-Prescribe                 =================================================================      HPI  Patient information was obtained from: patient     Use of : N/A         Orquidea Mckeon is a 42 year old female with a pertinent history of GERD who presents to this ED by walk in solo for evaluation of abdominal pain and bloating.     Patient reports abdominal pain and bloating that began mildly on Friday (4/28/23) but has been worsening. Her abdominal pain is pressure like, diffuse, and worse with movement. Yesterday her pain felt improved after eating, but she has not eaten or drank anything today aside from a small amount of coffee that made her feel worse. She denies ever having pain like this before and denies any history of abdominal issues or bloating. Patient also endorses nausea that began yesterday but no vomiting. She has had a few episodes of diarrhea, and notes that this morning when she farted she produced  blood and mucus. She endorses hot and cold flashes. Patient denies any dysuria, rhinorrhea, cough, or body aches.  She denies any sick contacts at home. Patient has not been on antibiotics recently. She has a history of appendectomy and hysterectomy. Patient denies any alcohol use. No other complaints or concerns expressed at this time.       REVIEW OF SYSTEMS   Review of Systems   Constitutional: Positive for chills and diaphoresis.   HENT: Negative for rhinorrhea.    Respiratory: Negative for cough.    Gastrointestinal: Positive for abdominal distention, abdominal pain, blood in stool, diarrhea and nausea. Negative for vomiting.   Genitourinary: Negative for dysuria.   Musculoskeletal: Negative for myalgias.   All other systems reviewed and are negative.    All other systems reviewed and are negative except as noted above in HPI.        --------------- MEDICAL HISTORY ---------------  PAST MEDICAL HISTORY:  Reviewed independently by me.  Past Medical History:   Diagnosis Date     Hemangioma     location: liver     Patient Active Problem List   Diagnosis     Herpes Simplex Type I     Pelvic Pain     Gastroesophageal reflux disease, esophagitis presence not specified     PMDD (premenstrual dysphoric disorder)     Hemangioma of liver     Eczema     Pain in pelvis     Lightheadedness     Dizziness       PAST SURGICAL HISTORY:  Reviewed independently by me.  Past Surgical History:   Procedure Laterality Date     APPENDECTOMY       BREAST SURGERY       LAPAROSCOPIC ASSISTED HYSTERECTOMY VAGINAL N/A 8/20/2020    Procedure: LAPAROSCOPIC ASSISTED VAGINAL HYSTERECTOMY BILATERAL SALPINGECTOMY CYSTOSCOPY;  Surgeon: Chelsea Mitchell MD;  [place] [facility] [place]necology     [facility] LIGATE FALLOPIAN TUBE      Description: Tubal Ligation;  Recorded: 06/27/2013;  Comments: for sterilization on 7/09       CURRENT MEDICATIONS:    Reviewed independently by me.  No current facility-administered medications for  this encounter.    Current Outpatient Medications:      dicyclomine (BENTYL) 20 MG tablet, Take 1 tablet (20 mg) by mouth 4 times daily as needed (abdominal pain), Disp: 20 tablet, Rfl: 0     ondansetron (ZOFRAN ODT) 4 MG ODT tab, Take 1 tablet (4 mg) by mouth every 8 hours as needed for nausea, Disp: 20 tablet, Rfl: 0     albuterol (PROAIR HFA/PROVENTIL HFA/VENTOLIN HFA) 108 (90 Base) MCG/ACT inhaler, Inhale 1-2 puffs into the lungs every 4 hours as needed for shortness of breath / dyspnea or wheezing, Disp: 8.5 g, Rfl: 0     desvenlafaxine succinate (PRISTIQ) 25 MG 24 hr tablet, TAKE 1 TABLET(25 MG) BY MOUTH DAILY, Disp: 90 tablet, Rfl: 0     diphenhydrAMINE (BENADRYL ALLERGY) 25 MG capsule, Administer 30 min - 2 hours pre - IV contrast injection, Disp: 2 capsule, Rfl: 0     ketoconazole (NIZORAL) 2 % external cream, Apply topically daily, Disp: 30 g, Rfl: 1     meclizine (ANTIVERT) 25 MG tablet, Take 1 tablet (25 mg) by mouth 3 times daily as needed for dizziness, Disp: 30 tablet, Rfl: 1    ALLERGIES:  Reviewed independently by me.  Allergies   Allergen Reactions     Blood-Group Specific Substance Unknown     Anti-D   present. Expect delays in blood for transfusion.  Draw 2 lavender  for type and screen orders.     Iodinated Contrast Media [Iodinated Contrast Media] Hives       FAMILY HISTORY:  Reviewed independently by me.  Family History   Problem Relation Age of Onset     Hemangiomas Sister      Hemangiomas Brother      Breast Cancer Maternal Grandmother      Kidney Disease Maternal Grandfather      Alcoholism Maternal Grandfather      Breast Cancer Maternal Aunt        SOCIAL HISTORY:   Reviewed independently by me.  Social History     Socioeconomic History     Marital status:    Tobacco Use     Smoking status: Former     Types: Cigarettes     Quit date: 3/4/2006     Years since quittin.1     Smokeless tobacco: Never   Substance and Sexual Activity     Alcohol use: Yes     Alcohol/week: 1.0  "standard drink of alcohol     Comment: rare     Drug use: No     Sexual activity: Yes     Partners: Male     Birth control/protection: Surgical       --------------- PHYSICAL EXAM ---------------  Nursing notes and vitals independently reviewed by me.  VITALS:  Vitals:    05/01/23 0843 05/01/23 1000   BP: (!) 148/78 110/56   Pulse: 85 59   Resp: 16    Temp: 97.5  F (36.4  C)    TempSrc: Oral    SpO2: 99% 97%   Weight: 56.7 kg (125 lb)    Height: 1.549 m (5' 1\")        PHYSICAL EXAM:    General:  alert, interactive, no distress, uncomfortable appearing standing at bedside hunched over initially during exam  Eyes:  conjunctivae clear, conjugate gaze  HENT:  atraumatic, nose with no rhinorrhea, oropharynx clear, mild dry mucus membranes  Neck:  no meningismus  Cardiovascular:  HR 80s during exam, regular rhythm, no murmurs, brisk cap refill  Chest:  no chest wall tenderness  Pulmonary:  no stridor, normal phonation, normal work of breathing, clear lungs bilaterally  Abdomen:  soft, mild diffuse bloating, mild diffuse tenderness, no rebound or guarding  :  no CVA tenderness  Back:  no midline spinal tenderness  Musculoskeletal:  no pretibial edema, no calf tenderness. Gross ROM intact to joints of extremities with no obvious deformities.  Skin:  warm, dry, no rash  Neuro:  awake, alert, answers questions appropriately, follows commands, moves all limbs  Psych:  calm, normal affect      --------------- RESULTS ---------------  LAB:  Reviewed and independently interpreted by me.  Results for orders placed or performed during the hospital encounter of 05/01/23   CT Abdomen Pelvis w/o Contrast    Impression    IMPRESSION:     1. Mild colitis descending colon should explain the patient's symptoms.    2. Large 10 x 6.6 cm mass centrally in the liver. Unchanged since noncontrast study 2021 and lung bases from CT chest 2020. Likely a large hemangioma as well as referenced on older reports however no direct imaging available " to definitively   characterizes.  3. Consider MRI abdomen without and with contrast to confirm.   UA with Microscopic reflex to Culture    Specimen: Urine, Midstream   Result Value Ref Range    Color Urine Light Yellow Colorless, Straw, Light Yellow, Yellow    Appearance Urine Clear Clear    Glucose Urine Negative Negative mg/dL    Bilirubin Urine Negative Negative    Ketones Urine Negative Negative mg/dL    Specific Gravity Urine 1.007 1.001 - 1.030    Blood Urine Negative Negative    pH Urine 7.5 (H) 5.0 - 7.0    Protein Albumin Urine 20 (A) Negative mg/dL    Urobilinogen Urine <2.0 <2.0 mg/dL    Nitrite Urine Negative Negative    Leukocyte Esterase Urine Negative Negative    Bacteria Urine Few (A) None Seen /HPF    RBC Urine 0 <=2 /HPF    WBC Urine 0 <=5 /HPF    Squamous Epithelials Urine 3 (H) <=1 /HPF   Basic metabolic panel   Result Value Ref Range    Sodium 140 136 - 145 mmol/L    Potassium 3.8 3.5 - 5.0 mmol/L    Chloride 106 98 - 107 mmol/L    Carbon Dioxide (CO2) 24 22 - 31 mmol/L    Anion Gap 10 5 - 18 mmol/L    Urea Nitrogen 7 (L) 8 - 22 mg/dL    Creatinine 0.81 0.60 - 1.10 mg/dL    Calcium 8.9 8.5 - 10.5 mg/dL    Glucose 95 70 - 125 mg/dL    GFR Estimate >90 >60 mL/min/1.73m2   Result Value Ref Range    Lipase 27 0 - 52 U/L   Hepatic function panel   Result Value Ref Range    Bilirubin Total 0.7 0.0 - 1.0 mg/dL    Bilirubin Direct 0.2 <=0.5 mg/dL    Protein Total 6.0 6.0 - 8.0 g/dL    Albumin 3.9 3.5 - 5.0 g/dL    Alkaline Phosphatase 46 45 - 120 U/L    AST 19 0 - 40 U/L    ALT 18 0 - 45 U/L   CRP inflammation   Result Value Ref Range    CRP 0.7 0.0 - <0.8 mg/dL   Result Value Ref Range    Magnesium 1.9 1.8 - 2.6 mg/dL   CBC with platelets and differential   Result Value Ref Range    WBC Count 6.2 4.0 - 11.0 10e3/uL    RBC Count 4.73 3.80 - 5.20 10e6/uL    Hemoglobin 14.0 11.7 - 15.7 g/dL    Hematocrit 39.5 35.0 - 47.0 %    MCV 84 78 - 100 fL    MCH 29.6 26.5 - 33.0 pg    MCHC 35.4 31.5 - 36.5 g/dL     RDW 12.8 10.0 - 15.0 %    Platelet Count 179 150 - 450 10e3/uL    % Neutrophils 64 %    % Lymphocytes 21 %    % Monocytes 13 %    % Eosinophils 1 %    % Basophils 0 %    % Immature Granulocytes 1 %    NRBCs per 100 WBC 0 <1 /100    Absolute Neutrophils 4.0 1.6 - 8.3 10e3/uL    Absolute Lymphocytes 1.3 0.8 - 5.3 10e3/uL    Absolute Monocytes 0.8 0.0 - 1.3 10e3/uL    Absolute Eosinophils 0.1 0.0 - 0.7 10e3/uL    Absolute Basophils 0.0 0.0 - 0.2 10e3/uL    Absolute Immature Granulocytes 0.0 <=0.4 10e3/uL    Absolute NRBCs 0.0 10e3/uL         RADIOLOGY:  Reviewed and independently interpreted by me. Please see official radiology report.  Recent Results (from the past 24 hour(s))   CT Abdomen Pelvis w/o Contrast    Narrative    EXAM: CT ABDOMEN PELVIS W/O CONTRAST  LOCATION: [facility]  DATE/TIME: 5/1/2023 9:26 AM CDT    INDICATION: abdominal pain, bloating, nausea, diarrhea  COMPARISON: 09/27/2021 CT abdomen without IV contrast. Upper abdomen from CT chest 08/23/2020. By history older study from 2008 not available for comparison.  TECHNIQUE: CT scan of the abdomen and pelvis was performed without IV contrast. Multiplanar reformats were obtained. Dose reduction techniques were used.  CONTRAST: None.    FINDINGS:   LOWER CHEST: Normal.    HEPATOBILIARY: 10.0 x 6.6 cm mass centrally in the liver. This has not significantly changed since 09/27/2021. By history this was present 2008 but those images are no longer available for comparison. This is indeterminate on the imaging available but by   old report this was described as a cavernous hemangioma in 2008 when it measured approximately 5 cm. Given this change in size would consider repeat characterizing.    No other findings.    PANCREAS: Normal.    SPLEEN: Normal.    ADRENAL GLANDS: Normal.    KIDNEYS/BLADDER: Normal.    BOWEL: Mild colitis involving the descending colon. Bowel loops otherwise normal.    LYMPH NODES: Normal.    VASCULATURE:  Unremarkable.    PELVIC ORGANS: Normal.    MUSCULOSKELETAL: Normal.      Impression    IMPRESSION:     1. Mild colitis descending colon should explain the patient's symptoms.    2. Large 10 x 6.6 cm mass centrally in the liver. Unchanged since noncontrast study 2021 and lung bases from CT chest 2020. Likely a large hemangioma as well as referenced on older reports however no direct imaging available to definitively   characterizes.  3. Consider MRI abdomen without and with contrast to confirm.             --------------- ADDITIONAL MDM ---------------  History:  - Supplemental history from:       -- see above charting, if applicable: patient  - External Record(s) reviewed:       -- see above charting, if applicable       -- Inpatient/outpatient record, prior labs, prior imaging    Workup:  - Chart documentation above includes differential considered and any EKGs or imaging independently interpreted by provider.  - In additional to work up documented, I considered the following work up:       -- see above charting, if applicable    External Consultation:  - Discussion of management with another provider:       -- see above charting, if applicable    Complicating Factors:  - Care impacted by chronic illness:       -- see above MDM, past medical history, & problem list  - Care affected by social determinants of health:       -- see above social history    Disposition Considerations:  - Discharge       -- I considered admission given that the patient came to the Emergency Department, but ultimately discharged the patient per decision making above       -- I recommended the patient continue their current prescription strength medication(s) as charted above in current medications list       -- I prescribed prescription strength medication(s) as charted above       -- I recommended over-the-counter medication(s) as charted above & in discharge instructions         I, Estella Moreno, am serving as a scribe to document  services personally performed by Dr. Patricio Deal based on my observation and the provider's statements to me. I, Patricio Deal MD attest that Estella Josh is acting in a scribe capacity, has observed my performance of the services and has documented them in accordance with my direction.      Patricio Deal MD  05/01/23  Emergency Medicine  [facility] EMERGENCY ROOM  [address] [address]  [phone]  Dept: [phone]     Patricio Deal MD  05/01/23 1530

## 2023-05-01 NOTE — ED TRIAGE NOTES
Pt reports generalized abdominal pressure since Friday with diarrhea.  Pt states dark stools now today.  Pt reports nausea with no vomiting.

## 2023-05-02 ENCOUNTER — TELEPHONE (OUTPATIENT)
Dept: FAMILY MEDICINE | Facility: CLINIC | Age: 43
End: 2023-05-02
Payer: COMMERCIAL

## 2023-05-02 RX ORDER — DESVENLAFAXINE 25 MG/1
25 TABLET, EXTENDED RELEASE ORAL DAILY
Qty: 90 TABLET | Refills: 2 | Status: SHIPPED | OUTPATIENT
Start: 2023-05-02 | End: 2024-02-05

## 2023-05-02 NOTE — TELEPHONE ENCOUNTER
Incoming call from patient wanting provider's recommendation.  Patient was seen in ER yesterday 5/1 & was dx with Colitis. Was prescribed Dicyclomine PRN for abd pain. States that she took Dicyclomine & Advil this am but does not seem to help. Pain is 7/10, which has been the same. Denies any nausea/vomitting. Been having chills. Feels bloated. Able to have BM but still has mucus & blood in stool.    Per patient, was unable to schedule ER follow-up until 5/22 with provider. Reports that she is going out of town tomorrow to [place] for a week. Patient was wondering if she should be concerned while on vacation or if she should be put on any antibiotic. Routing to Dr. Schultz to advise.     Please leave detailed message if no answer, per patient request.    Edouard Live, RN, BSN  [facility]

## 2023-05-02 NOTE — TELEPHONE ENCOUNTER
"Last Written Prescription Date:  2/2/2023  Last Fill Quantity: 90,  # refills: 0   Last office visit provider:  12/19/2022     Requested Prescriptions   Pending Prescriptions Disp Refills     desvenlafaxine succinate (PRISTIQ) 25 MG 24 hr tablet 90 tablet 0     Sig: Take 1 tablet (25 mg) by mouth daily       Serotonin-Norepinephrine Reuptake Inhibitors  Passed - 5/1/2023 12:57 PM        Passed - Blood pressure under 140/90 in past 12 months     BP Readings from Last 3 Encounters:   05/01/23 110/56   12/19/22 106/64   09/27/22 100/60                 Passed - Recent (12 mo) or future (30 days) visit within the authorizing provider's specialty     Patient has had an office visit with the authorizing provider or a provider within the authorizing providers department within the previous 12 mos or has a future within next 30 days. See \"Patient Info\" tab in inbasket, or \"Choose Columns\" in Meds & Orders section of the refill encounter.              Passed - Medication is active on med list        Passed - Patient is age 18 or older        Passed - No active pregnancy on record        Passed - Normal serum creatinine on file in past 12 months     Recent Labs   Lab Test 05/01/23  0906   CR 0.81       Ok to refill medication if creatinine is low          Passed - No positive pregnancy test in past 12 months             Aleja Singh RN 05/02/23 9:32 AM  "

## 2023-05-03 NOTE — TELEPHONE ENCOUNTER
Informed pt of provider's message and she verbalized understanding. Patient stated she is not leaving to [place] until later tonight. Added pt to provider's schedule for today at 12:40 PM and pt was advised to be here at 12:20 PM. Will route to let provider know.    STEVE GarciaN, RN  [facility]

## 2023-05-03 NOTE — TELEPHONE ENCOUNTER
It appears that patient was leaving town today -- otherwise I could probably add her on over lunch today.  It looked like her labs looked good, if there was a bacterial cause of her symptoms, her white count should have been elevated.  Let me know if she can be seen today or if she has left already.  If she is in severe pain while in [place], she may need to be seen there again.  Compelling reasons to be seen would be fever, lots of blood in the stool, or inability to pass stool/increased bloating.

## 2023-05-08 ENCOUNTER — HOSPITAL ENCOUNTER (EMERGENCY)
Facility: CLINIC | Age: 43
Discharge: HOME | End: 2023-05-08
Attending: STUDENT IN AN ORGANIZED HEALTH CARE EDUCATION/TRAINING PROGRAM | Admitting: STUDENT IN AN ORGANIZED HEALTH CARE EDUCATION/TRAINING PROGRAM
Payer: COMMERCIAL

## 2023-05-08 ENCOUNTER — APPOINTMENT (OUTPATIENT)
Dept: ULTRASOUND IMAGING | Facility: CLINIC | Age: 43
End: 2023-05-08
Attending: STUDENT IN AN ORGANIZED HEALTH CARE EDUCATION/TRAINING PROGRAM
Payer: COMMERCIAL

## 2023-05-08 ENCOUNTER — APPOINTMENT (OUTPATIENT)
Dept: CT IMAGING | Facility: CLINIC | Age: 43
End: 2023-05-08
Attending: STUDENT IN AN ORGANIZED HEALTH CARE EDUCATION/TRAINING PROGRAM
Payer: COMMERCIAL

## 2023-05-08 VITALS
OXYGEN SATURATION: 98 % | DIASTOLIC BLOOD PRESSURE: 76 MMHG | WEIGHT: 125 LBS | SYSTOLIC BLOOD PRESSURE: 130 MMHG | TEMPERATURE: 98 F | HEART RATE: 72 BPM | BODY MASS INDEX: 23.6 KG/M2 | HEIGHT: 61 IN | RESPIRATION RATE: 16 BRPM

## 2023-05-08 DIAGNOSIS — R10.84 ABDOMINAL PAIN, GENERALIZED: Primary | ICD-10-CM

## 2023-05-08 DIAGNOSIS — K59.00 CONSTIPATION, UNSPECIFIED CONSTIPATION TYPE: ICD-10-CM

## 2023-05-08 LAB
ALBUMIN SERPL-MCNC: 4.2 G/DL (ref 3.5–5)
ALP SERPL-CCNC: 43 U/L (ref 45–120)
ALT SERPL W P-5'-P-CCNC: 17 U/L (ref 0–45)
ANION GAP SERPL CALCULATED.3IONS-SCNC: 10 MMOL/L (ref 5–18)
AST SERPL W P-5'-P-CCNC: 17 U/L (ref 0–40)
BASOPHILS # BLD AUTO: 0 10E3/UL (ref 0–0.2)
BASOPHILS NFR BLD AUTO: 1 %
BILIRUB DIRECT SERPL-MCNC: 0.2 MG/DL
BILIRUB SERPL-MCNC: 0.6 MG/DL (ref 0–1)
BUN SERPL-MCNC: 10 MG/DL (ref 8–22)
C REACTIVE PROTEIN LHE: 0.2 MG/DL (ref 0–?)
CALCIUM SERPL-MCNC: 9.1 MG/DL (ref 8.5–10.5)
CHLORIDE BLD-SCNC: 108 MMOL/L (ref 98–107)
CO2 SERPL-SCNC: 24 MMOL/L (ref 22–31)
CREAT SERPL-MCNC: 0.78 MG/DL (ref 0.6–1.1)
EOSINOPHIL # BLD AUTO: 0.2 10E3/UL (ref 0–0.7)
EOSINOPHIL NFR BLD AUTO: 2 %
ERYTHROCYTE [DISTWIDTH] IN BLOOD BY AUTOMATED COUNT: 12.6 % (ref 10–15)
GFR SERPL CREATININE-BSD FRML MDRD: >90 ML/MIN/1.73M2
GLUCOSE BLD-MCNC: 99 MG/DL (ref 70–125)
HCT VFR BLD AUTO: 42.2 % (ref 35–47)
HGB BLD-MCNC: 14.7 G/DL (ref 11.7–15.7)
HOLD SPECIMEN: NORMAL
IMM GRANULOCYTES # BLD: 0 10E3/UL
IMM GRANULOCYTES NFR BLD: 0 %
LIPASE SERPL-CCNC: 37 U/L (ref 0–52)
LYMPHOCYTES # BLD AUTO: 2.1 10E3/UL (ref 0.8–5.3)
LYMPHOCYTES NFR BLD AUTO: 27 %
MCH RBC QN AUTO: 29.8 PG (ref 26.5–33)
MCHC RBC AUTO-ENTMCNC: 34.8 G/DL (ref 31.5–36.5)
MCV RBC AUTO: 85 FL (ref 78–100)
MONOCYTES # BLD AUTO: 0.4 10E3/UL (ref 0–1.3)
MONOCYTES NFR BLD AUTO: 5 %
NEUTROPHILS # BLD AUTO: 5.2 10E3/UL (ref 1.6–8.3)
NEUTROPHILS NFR BLD AUTO: 65 %
NRBC # BLD AUTO: 0 10E3/UL
NRBC BLD AUTO-RTO: 0 /100
PLATELET # BLD AUTO: 266 10E3/UL (ref 150–450)
POTASSIUM BLD-SCNC: 3.6 MMOL/L (ref 3.5–5)
PROT SERPL-MCNC: 6.7 G/DL (ref 6–8)
RBC # BLD AUTO: 4.94 10E6/UL (ref 3.8–5.2)
SODIUM SERPL-SCNC: 142 MMOL/L (ref 136–145)
WBC # BLD AUTO: 8 10E3/UL (ref 4–11)

## 2023-05-08 PROCEDURE — 36415 COLL VENOUS BLD VENIPUNCTURE: CPT | Performed by: STUDENT IN AN ORGANIZED HEALTH CARE EDUCATION/TRAINING PROGRAM

## 2023-05-08 PROCEDURE — 99285 EMERGENCY DEPT VISIT HI MDM: CPT | Mod: 25

## 2023-05-08 PROCEDURE — 258N000003 HC RX IP 258 OP 636: Performed by: STUDENT IN AN ORGANIZED HEALTH CARE EDUCATION/TRAINING PROGRAM

## 2023-05-08 PROCEDURE — 85004 AUTOMATED DIFF WBC COUNT: CPT | Performed by: STUDENT IN AN ORGANIZED HEALTH CARE EDUCATION/TRAINING PROGRAM

## 2023-05-08 PROCEDURE — 96361 HYDRATE IV INFUSION ADD-ON: CPT

## 2023-05-08 PROCEDURE — 74176 CT ABD & PELVIS W/O CONTRAST: CPT

## 2023-05-08 PROCEDURE — 76856 US EXAM PELVIC COMPLETE: CPT

## 2023-05-08 PROCEDURE — 250N000011 HC RX IP 250 OP 636: Performed by: STUDENT IN AN ORGANIZED HEALTH CARE EDUCATION/TRAINING PROGRAM

## 2023-05-08 PROCEDURE — 82248 BILIRUBIN DIRECT: CPT | Performed by: STUDENT IN AN ORGANIZED HEALTH CARE EDUCATION/TRAINING PROGRAM

## 2023-05-08 PROCEDURE — 80053 COMPREHEN METABOLIC PANEL: CPT | Performed by: STUDENT IN AN ORGANIZED HEALTH CARE EDUCATION/TRAINING PROGRAM

## 2023-05-08 PROCEDURE — 96374 THER/PROPH/DIAG INJ IV PUSH: CPT

## 2023-05-08 PROCEDURE — 83690 ASSAY OF LIPASE: CPT | Performed by: STUDENT IN AN ORGANIZED HEALTH CARE EDUCATION/TRAINING PROGRAM

## 2023-05-08 PROCEDURE — 86140 C-REACTIVE PROTEIN: CPT | Performed by: STUDENT IN AN ORGANIZED HEALTH CARE EDUCATION/TRAINING PROGRAM

## 2023-05-08 RX ORDER — POLYETHYLENE GLYCOL 3350 17 G/17G
1 POWDER, FOR SOLUTION ORAL DAILY
Qty: 510 G | Refills: 0 | Status: SHIPPED | OUTPATIENT
Start: 2023-05-08 | End: 2023-06-07

## 2023-05-08 RX ORDER — KETOROLAC TROMETHAMINE 15 MG/ML
15 INJECTION, SOLUTION INTRAMUSCULAR; INTRAVENOUS ONCE
Status: COMPLETED | OUTPATIENT
Start: 2023-05-08 | End: 2023-05-08

## 2023-05-08 RX ADMIN — KETOROLAC TROMETHAMINE 15 MG: 15 INJECTION, SOLUTION INTRAMUSCULAR; INTRAVENOUS at 17:57

## 2023-05-08 RX ADMIN — SODIUM CHLORIDE 1000 ML: 9 INJECTION, SOLUTION INTRAVENOUS at 17:56

## 2023-05-08 ASSESSMENT — ACTIVITIES OF DAILY LIVING (ADL): ADLS_ACUITY_SCORE: 33

## 2023-05-08 NOTE — ED TRIAGE NOTES
Pt reports being seen for L side abdominal pain a week ago. Has not improved. Pain is now more generalized and gradually worsening.     Triage Assessment     Row Name 05/08/23 5726       Triage Assessment (Adult)    Airway WDL WDL       Respiratory WDL    Respiratory WDL WDL       Skin Circulation/Temperature WDL    Skin Circulation/Temperature WDL WDL       Cardiac WDL    Cardiac WDL WDL       Peripheral/Neurovascular WDL    Peripheral Neurovascular WDL WDL       Cognitive/Neuro/Behavioral WDL    Cognitive/Neuro/Behavioral WDL WDL

## 2023-05-08 NOTE — ED PROVIDER NOTES
"EMERGENCY DEPARTMENT ENCOUNTER       ED Course & Medical Decision Making     4:41 PM I met with patient for initial encounter.    Final Impression  42 year old female presents for evaluation of lower abdominal pain ongoing for the last 8-10 days.  Seen in this ED on 5/1 and had a fairly reassuring work-up, discharged home Zofran and Bentyl the symptoms persist.  Patient primarily reporting symptoms of lower abdominal pain, decreased appetite, continues to have what she reports as \"normal\" stools every other day or so.  No vomiting reported.  On exam patient does have some lower abdominal tenderness, worse on the right lateral abdomen, though good bowel sounds throughout.  Vital signs reassuring and fairly stable.  Lab returned showing normal CBC, BMP, LFTs, lipase.  Normal CRP.  Repeat CT abdomen pelvis that previously had shown colitis now appears fairly normal, no obvious evidence of colitis today.  Does appear to have moderate retained stool burden, could potentially be experiencing some degree of constipation.  Given her persistent pain with relatively normal CT x2, did proceed with pelvic ultrasound.  Showed good flow to bilateral ovaries, small amount of fluid in the area near the right ovary, suspect this is physiologic.  All findings discussed with patient.  She was already aware of the liver mass, states it has been imaged several times in the past, reports multiple people in her family have had similar findings throughout her body and she thinks they have all been ruled hemangiomas.  Fairly reassuring work-up, especially in light of 8-10 days of symptoms with relatively normal labs and imaging.  Would consider constipation as fairly high up on the differential at this juncture, thus did recommend she trial MiraLAX, prescription written for MiraLAX, will discharge home with return precautions.    Prior to making a final disposition on this patient the results of patient's tests and other diagnostic studies " were discussed with the patient. All questions were answered. Patient expressed understanding of the plan and was amenable to it.    Medical Decision Making    History:    Supplemental history from: NA    External Record(s) reviewed as documented below;  o 5/1/2023 [facility] emergency department visits for colitis, CT showed mild colitis of the descending colon, likely causative of patient's symptoms, discharged home with prescription for Zofran and dicyclomine for symptomatic relief.  CBC, BMP, LFTs, lipase, UA all fairly unremarkable.   o 8/20/2020 operative note from  Dr Chelsea Mitchell for laparoscopic-assisted vaginal hysterectomy, bilateral salpingectomy, pre-op note documents irregular menses, menorrhagia    Work Up:    Chart documentation includes differential considered and any EKGs or imaging independently interpreted by provider, where specified.    DDx considered but not limited to: Colitis, diverticulitis, appendicitis, ovarian torsion, ovarian cyst, constipation, viral gastroenteritis    External consultation:    Discussion of management with another provider:NA    Complicating factors:    Care impacted by chronic illness: Pelvic pain, hemangioma of liver    Care affected by social determinants of health: N/A    Disposition considerations: Discharge. I prescribed additional prescription strength medication(s) as charted. See documentation for any additional details.      Medications   0.9% sodium chloride BOLUS (0 mLs Intravenous Stopped 5/8/23 1910)   ketorolac (TORADOL) injection 15 mg (15 mg Intravenous $Given 5/8/23 1757)       New Prescriptions    POLYETHYLENE GLYCOL (MIRALAX) 17 GM/DOSE POWDER    Take 17 g (1 capful.) by mouth daily for 30 days       Final Impression     1. Abdominal pain, generalized    2. Constipation, unspecified constipation type        Chief Complaint     Chief Complaint   Patient presents with     Abdominal Pain     Pt reports being seen for L side abdominal pain a week  "ago. Has not improved. Pain is now more generalized and gradually worsening.    HPI     Orquidea Mckeon is a 42 year old female who presents for evaluation of abdominal pain.    Per chart review, patient was seen in this ED on 5/1 for abdominal pain. CT showed mild colitis descending colon. Large 10 x 6.6 cm mass centrally in the liver. Unchanged since noncontrast study 2021 and lung bases from CT chest 2020. Likely a large hemangioma as well as referenced on older reports however no direct imaging available to definitively characterizes. Patient was discharged on dicyclomine HCl 20 mg x4/day, and zofran 4 mg every 8 hours.    Patient endorses abdominal pain that has been ongoing since 5/1, saying that the pain was initially left sided, but is now throughout her entire abdomen, with radiation to her back and chest. She also endorses a bloating sensation to the right side of her abdomen. Patient notes that she is having a bowel movement every 2 days.     Patient notes having prior appendectomy and hysterectomy for endometriosis. She reports having a colonoscopy 4 years ago without acute findings.    Patient denies all other complaints at this time.    I, Bc Ahn am serving as a scribe to document services personally performed by Dr. Erasmo Naylor MD, based on my observation and the provider's statements to me. I, Dr. Erasmo Naylor MD attest that Bc Ahn is acting in a scribe capacity, has observed my performance of the services and has documented them in accordance with my direction.    Physical Exam     /79   Pulse 75   Temp 98  F (36.7  C) (Oral)   Resp 16   Ht 1.549 m (5' 1\")   Wt 56.7 kg (125 lb)   LMP  (LMP Unknown)   SpO2 99%   BMI 23.62 kg/m    Constitutional: Awake, alert, in no acute distress.  Head: Normocephalic, atraumatic.  ENT: Mucous membranes moist.  Eyes: Conjunctiva normal.  Respiratory: Respirations even, unlabored, in no acute respiratory distress.  Cardiovascular: " Regular rate and rhythm. Good peripheral perfusion.  GI: Abdomen soft, moderate lower abdominal tenderness, worse in the right lower and lateral abdomen.  Bowel sounds present throughout.  Musculoskeletal: Moves all 4 extremities equally.  Integument: Warm, dry.  Neurologic: Alert & oriented x 3. Normal speech. Grossly normal motor and sensory function. No focal deficits noted.  Psychiatric: Normal mood    Labs & Imaging     Imaging reviewed and independently interpreted as below;   CT abdomen pelvis images reviewed, pelvic mass appears to be largely unchanged when compared to prior.  Moderate amount of stool throughout, no obstruction.    Results for orders placed or performed during the hospital encounter of 05/08/23   CT Abdomen Pelvis w/o Contrast    Impression    IMPRESSION:   1.  No acute abnormality.  2.  A stable indeterminate 10.3 cm mass in the liver.     US Pelvis Cmplt w Transvag & Doppler LmtPel Duplex Limited    Impression    IMPRESSION:    1.  Tiny follicles in both ovaries.    2.  The uterus is surgically absent.    3.  Tiny amount of free fluid in the general region of the right ovary.    4.  Normal arterial and venous flow to both ovaries.         Extra Blue Top Tube   Result Value Ref Range    Hold Specimen JIC    Extra Red Top Tube   Result Value Ref Range    Hold Specimen JIC    Extra Green Top (Lithium Heparin) Tube   Result Value Ref Range    Hold Specimen JIC    Extra Purple Top Tube   Result Value Ref Range    Hold Specimen JIC    Basic metabolic panel   Result Value Ref Range    Sodium 142 136 - 145 mmol/L    Potassium 3.6 3.5 - 5.0 mmol/L    Chloride 108 (H) 98 - 107 mmol/L    Carbon Dioxide (CO2) 24 22 - 31 mmol/L    Anion Gap 10 5 - 18 mmol/L    Urea Nitrogen 10 8 - 22 mg/dL    Creatinine 0.78 0.60 - 1.10 mg/dL    Calcium 9.1 8.5 - 10.5 mg/dL    Glucose 99 70 - 125 mg/dL    GFR Estimate >90 >60 mL/min/1.73m2   Result Value Ref Range    Lipase 37 0 - 52 U/L   Hepatic function panel    Result Value Ref Range    Bilirubin Total 0.6 0.0 - 1.0 mg/dL    Bilirubin Direct 0.2 <=0.5 mg/dL    Protein Total 6.7 6.0 - 8.0 g/dL    Albumin 4.2 3.5 - 5.0 g/dL    Alkaline Phosphatase 43 (L) 45 - 120 U/L    AST 17 0 - 40 U/L    ALT 17 0 - 45 U/L   CRP inflammation   Result Value Ref Range    CRP 0.2 0.0 - <0.8 mg/dL   CBC with platelets and differential   Result Value Ref Range    WBC Count 8.0 4.0 - 11.0 10e3/uL    RBC Count 4.94 3.80 - 5.20 10e6/uL    Hemoglobin 14.7 11.7 - 15.7 g/dL    Hematocrit 42.2 35.0 - 47.0 %    MCV 85 78 - 100 fL    MCH 29.8 26.5 - 33.0 pg    MCHC 34.8 31.5 - 36.5 g/dL    RDW 12.6 10.0 - 15.0 %    Platelet Count 266 150 - 450 10e3/uL    % Neutrophils 65 %    % Lymphocytes 27 %    % Monocytes 5 %    % Eosinophils 2 %    % Basophils 1 %    % Immature Granulocytes 0 %    NRBCs per 100 WBC 0 <1 /100    Absolute Neutrophils 5.2 1.6 - 8.3 10e3/uL    Absolute Lymphocytes 2.1 0.8 - 5.3 10e3/uL    Absolute Monocytes 0.4 0.0 - 1.3 10e3/uL    Absolute Eosinophils 0.2 0.0 - 0.7 10e3/uL    Absolute Basophils 0.0 0.0 - 0.2 10e3/uL    Absolute Immature Granulocytes 0.0 <=0.4 10e3/uL    Absolute NRBCs 0.0 10e3/uL          Erasmo Naylor MD  05/08/23 1956       Erasmo Naylor MD  05/08/23 7075

## 2023-05-09 NOTE — DISCHARGE INSTRUCTIONS
Your labs, CT scan, and ultrasound were all fairly reassuring today.    You do have appear to have some moderate amount of retained stool, the symptoms you are experiencing could be related to some mild constipation.  We recommend trying MiraLAX 17 g/1 capful daily to help with stools for the next week to see if this improves her symptoms at all.

## 2023-05-11 ENCOUNTER — OFFICE VISIT (OUTPATIENT)
Dept: FAMILY MEDICINE | Facility: CLINIC | Age: 43
End: 2023-05-11
Payer: COMMERCIAL

## 2023-05-11 VITALS
RESPIRATION RATE: 18 BRPM | SYSTOLIC BLOOD PRESSURE: 116 MMHG | TEMPERATURE: 98.1 F | DIASTOLIC BLOOD PRESSURE: 64 MMHG | WEIGHT: 128 LBS | BODY MASS INDEX: 24.17 KG/M2 | HEIGHT: 61 IN | HEART RATE: 72 BPM | OXYGEN SATURATION: 99 %

## 2023-05-11 DIAGNOSIS — D18.03 HEMANGIOMA OF LIVER: ICD-10-CM

## 2023-05-11 DIAGNOSIS — K59.00 CONSTIPATION, UNSPECIFIED CONSTIPATION TYPE: Primary | ICD-10-CM

## 2023-05-11 PROCEDURE — 99213 OFFICE O/P EST LOW 20 MIN: CPT | Performed by: FAMILY MEDICINE

## 2023-05-11 ASSESSMENT — PAIN SCALES - GENERAL: PAINLEVEL: NO PAIN (0)

## 2023-05-11 NOTE — PROGRESS NOTES
Hospital Follow-up Visit:    Hospital/Nursing Home/IP Rehab Facility: [facility]  Date of Admission: 5/8/2023  Date of Discharge: Same  Reason(s) for Admission: Abdominal pain    Was your hospitalization related to COVID-19? No   Problems taking medications regularly:  None  Medication changes since discharge: None  Problems adhering to non-medication therapy:  None    Summary of hospitalization:  [facility] discharge summary reviewed  Diagnostic Tests/Treatments reviewed.  Follow up needed: none  Other Healthcare Providers Involved in Patient s Care:         None  Update since discharge: improved.   Plan of care communicated with patient     Assessment and plan; Jolene was hospitalized in the emergency room x2 for left lower quadrant and some right lower quadrant discomfort; complete work-up including CT scan and blood work acute phase reactants lipase liver enzymes revealed area of transient inflammatory colitis secondary to distal constipation probably related to slow transit time for the last 10 days; patient's bowels move normally daily she is not under some stress and has been eating irregularly for that period of time and apparently inflamed her distal descending colon.  Incidental findings were a 10 cm hemangioma in her liver and hemangioma on her L5 vertebrae; family history very interesting in that liver hemangiomas are present in siblings and first-degree relatives.  Patient has had no bleeding no GI bleeding no history of either telangiectasias; no telangiectasias noted at birth.  Patient is now feeling great she is eating 3 meals a day again avoiding pizza fatty foods she is an avid runner and wants to increase her aerobic exercise again.  She is at ideal weight.  We think this is transient idiopathic mild colitis which will respond to normal diet hydration.  She should have a repeat ultrasound in my opinion in 6 months to check on the hemangioma.  I would say  if she got ultrasounds yearly on the hemangioma x3 and there was no change she could go to routine follow-up.  I suggest she see Dr. Vianey Lux here for primary care

## 2023-05-17 ENCOUNTER — TELEPHONE (OUTPATIENT)
Dept: FAMILY MEDICINE | Facility: CLINIC | Age: 43
End: 2023-05-17
Payer: COMMERCIAL

## 2023-05-17 NOTE — TELEPHONE ENCOUNTER
PATIENT DROPPED OFF THE PAPERWORK WHEN DONE CALL PATIENT AND SHE WILL PICK IT UP PUTTING IN DRS MAIL BOX

## 2023-05-17 NOTE — TELEPHONE ENCOUNTER
Reason for call:  Other     Patient called regarding (reason for call): call back    Additional comments: Patient is calling stating that the [place] papers that were filled out need a signature of  on the papers please advise and please call patient back.Thank you.      Phone number to reach patient:  Home number on file [phone] (home)    Best Time:  any    Can we leave a detailed message on this number?  YES

## 2023-05-17 NOTE — TELEPHONE ENCOUNTER
Forms completed at 5/11/23 with Dr Greene. Patient took forms with her; will bring in forms wed afternoon, I will have Dr Greene sign and date them Thursday morning, patient will pick-up Thursday.

## 2023-10-23 ENCOUNTER — MYC MEDICAL ADVICE (OUTPATIENT)
Dept: FAMILY MEDICINE | Facility: CLINIC | Age: 43
End: 2023-10-23
Payer: COMMERCIAL

## 2023-10-23 ENCOUNTER — NURSE TRIAGE (OUTPATIENT)
Dept: FAMILY MEDICINE | Facility: CLINIC | Age: 43
End: 2023-10-23
Payer: COMMERCIAL

## 2023-10-23 ENCOUNTER — OFFICE VISIT (OUTPATIENT)
Dept: FAMILY MEDICINE | Facility: CLINIC | Age: 43
End: 2023-10-23
Payer: COMMERCIAL

## 2023-10-23 VITALS
OXYGEN SATURATION: 100 % | HEART RATE: 64 BPM | HEIGHT: 61 IN | WEIGHT: 129 LBS | SYSTOLIC BLOOD PRESSURE: 111 MMHG | BODY MASS INDEX: 24.35 KG/M2 | DIASTOLIC BLOOD PRESSURE: 70 MMHG | RESPIRATION RATE: 16 BRPM | TEMPERATURE: 98.2 F

## 2023-10-23 DIAGNOSIS — J02.9 ACUTE PHARYNGITIS, UNSPECIFIED ETIOLOGY: Primary | ICD-10-CM

## 2023-10-23 LAB
DEPRECATED S PYO AG THROAT QL EIA: NEGATIVE
GROUP A STREP BY PCR: NOT DETECTED

## 2023-10-23 PROCEDURE — 87651 STREP A DNA AMP PROBE: CPT | Performed by: PHYSICIAN ASSISTANT

## 2023-10-23 PROCEDURE — 99213 OFFICE O/P EST LOW 20 MIN: CPT | Performed by: PHYSICIAN ASSISTANT

## 2023-10-23 RX ORDER — AMOXICILLIN 500 MG/1
500 CAPSULE ORAL 2 TIMES DAILY
Qty: 20 CAPSULE | Refills: 0 | Status: SHIPPED | OUTPATIENT
Start: 2023-10-23 | End: 2023-11-02

## 2023-10-23 NOTE — TELEPHONE ENCOUNTER
"Patient called back to clarify what the provider is asking her for in regards to an e-visit.    Writer relayed to the patient that the provider needs more information about the patient's sore throat before the provider makes a decision about strep testing vs an appointment to be seen in clinic, which is the reason for the e-visit    Writer explained the e-visit process to the patient and explained she would have to pick \"other\" as a reason for the e-visit as there are no e-visit topics for sore throat, as well as pick \"any available provider\" so her e-visit is addressed quickly    If the provider places an order for strep testing after reviewing the e-visit, writer gave the patient the phone numbers for [facility] and the main [place] phone number so the patient can schedule an appointment, whether it is with a provider or a lab only visit.    Denies other questions or concerns at this time.    Writer told the patient to call back for worsening symptoms and the patient verbalized understanding and agreed with the plan.    Yareli Pink RN, BSN  [facility]        "

## 2023-10-23 NOTE — TELEPHONE ENCOUNTER
"SUMMARY: Patient is looking for strep throat treatment-see pictures sent into the clinic via ams [employer] from the patient on 10/23/23    Patient declines to be seen in office or at urgent care    Will route to provider to review and advise next steps.    Nurse Triage SBAR    Is this a 2nd Level Triage? YES, LICENSED PRACTITIONER REVIEW IS REQUIRED    Situation:  See ams [employer] message into the clinic on 10/23/23:    \"Are able to send me in a prescription for strep?? I have had a sore throat and white bumps for 3 days and 2 family members I have been around were both diagnosed with strep and given amoxicillin.        I have provided two photos showing the white bumps.      Thank you for your time.   Orquidea\"    Also see pictures on the [employer] message sent into the clinic by the patient    Background:  Exposure to strep by her niece and sister on Friday, 10/20/23, as both of her family members tested positive for strep throat    Assessment:   Patient reports moderate pain and white pustules in her throat since Friday, 10/20/23    Exposed to strep throat through family on 10/20/23    Sent pictures of her throat into the clinic via [employer] on 10/23/23, which seem to possibly show some white pustules on her throat    Has runny nose, cough, red eyes, and hoarse voice    Also has a heaviness in her chest when moving around; happens when she is overdoing the exercise-she takes a few breaths and she is fine    Denies fever, lack of appetite, vomiting, diarrhea, or dysuria    Denies SOB, chest pain, weakness or numbness of any parts of her body, abdominal pain, swelling of her lower extremities, or headache    Patient would like to speak with the provider about getting antibiotics for treatment of strep throat    Protocol Recommended Disposition:   See in Office Today    Recommendation:   Gave care advice. Recommended that the patient be seen in office today, but there are no available appointments left in clinic today    Writer " "recommended that the patient go the [facility] as there are no available appointments until tomorrow, 10/24/23, in clinic    Patient verbalized understanding, but declined to make an appointment to be seen or to go to urgent care    Would like to ask the provider for treatment of strep throat as she had exposure and has pustules in her throat    Will route to the provider to review and advise next steps.    Patient is in between PCPs right now and has an establish care visit with Dr. Lux in 2/2024    Routed to provider    Does the patient meet one of the following criteria for ADS visit consideration? 16+ years old, with an [facility] PCP     TIP  Providers, please consider if this condition is appropriate for management at one of our Acute and Diagnostic Services sites.     If patient is a good candidate, please use dotphrase <dot>triageresponse and select Refer to ADS to document.     Reason for Disposition   Pus on tonsils (back of throat) and swollen neck lymph nodes ('glands')    Additional Information   Negative: SEVERE difficulty breathing (e.g., struggling for each breath, speaks in single words)   Negative: Sounds like a life-threatening emergency to the triager   Negative: Throat culture results, call about   Negative: Productive cough is main symptom   Negative: Runny nose is main symptom   Negative: Drooling or spitting out saliva (because can't swallow)   Negative: Unable to open mouth completely   Negative: Drinking very little and has signs of dehydration (e.g., no urine > 12 hours, very dry mouth, very lightheaded)   Negative: Patient sounds very sick or weak to the triager   Negative: Difficulty breathing (per caller) but not severe   Negative: Fever > 103 F (39.4 C)   Negative: Refuses to drink anything for > 12 hours   Negative: SEVERE sore throat pain    Answer Assessment - Initial Assessment Questions  1. ONSET: \"When did the throat start hurting?\" (Hours or days ago)         Saturday, 10/21/23    2. " "SEVERITY: \"How bad is the sore throat?\" (Scale 1-10; mild, moderate or severe)    - MILD (1-3):  Doesn't interfere with eating or normal activities.    - MODERATE (4-7): Interferes with eating some solids and normal activities.    - SEVERE (8-10):  Excruciating pain, interferes with most normal activities.    - SEVERE WITH DYSPHAGIA (10): Can't swallow liquids, drooling.        moderate      3. STREP EXPOSURE: \"Has there been any exposure to strep within the past week?\" If Yes, ask: \"What type of contact occurred?\"         Yes, sister and niece and all together Friday, 10/2023    4.  VIRAL SYMPTOMS: \"Are there any symptoms of a cold, such as a runny nose, cough, hoarse voice or red eyes?\"         Runny nose, cough, hoarse voice, red eyes      5. FEVER: \"Do you have a fever?\" If Yes, ask: \"What is your temperature, how was it measured, and when did it start?\"        No fever      6. PUS ON THE TONSILS: \"Is there pus on the tonsils in the back of your throat?\"        Seems like there is pustules on left side of throat and a crater on the right side    7. OTHER SYMPTOMS: \"Do you have any other symptoms?\" (e.g., difficulty breathing, headache, rash)        Heavy chest when moving around and she takes a few breaths and she is fine-happens when she is overdoing the exercise      8. PREGNANCY: \"Is there any chance you are pregnant?\" \"When was your last menstrual period?\"        No chance of pregnancy    Protocols used: Sore Throat-A-OH    Yareli Pink RN, BSN  [employer]    "

## 2023-10-23 NOTE — TELEPHONE ENCOUNTER
Provider Recommendation Follow Up:   Unable to reach patient/caregiver. Left message that writer will respond via [employer] to pt with provider's message and pt to return call if she have any questions.    STEVE GarciaN, RN  [facility]

## 2023-10-23 NOTE — PROGRESS NOTES
Subjective:    Orquidea Mckeon is a 43 year old female who presents with chief complaint of possible strep throat.  Sore throat, x sat, getting worse  Around family who just had pos strep test s few days ago  Hurts to swallow  No fevers  No covid tests done  No recent ABT    Patient Active Problem List   Diagnosis    Herpes Simplex Type I    Pelvic Pain    Gastroesophageal reflux disease, esophagitis presence not specified    PMDD (premenstrual dysphoric disorder)    Hemangioma of liver    Eczema    Pain in pelvis    Lightheadedness    Dizziness       Current Outpatient Medications:     albuterol (PROAIR HFA/PROVENTIL HFA/VENTOLIN HFA) 108 (90 Base) MCG/ACT inhaler, Inhale 1-2 puffs into the lungs every 4 hours as needed for shortness of breath / dyspnea or wheezing, Disp: 8.5 g, Rfl: 0    amoxicillin (AMOXIL) 500 MG capsule, Take 1 capsule (500 mg) by mouth 2 times daily for 10 days, Disp: 20 capsule, Rfl: 0    desvenlafaxine succinate (PRISTIQ) 25 MG 24 hr tablet, Take 1 tablet (25 mg) by mouth daily, Disp: 90 tablet, Rfl: 2    diphenhydrAMINE (BENADRYL ALLERGY) 25 MG capsule, Administer 30 min - 2 hours pre - IV contrast injection, Disp: 2 capsule, Rfl: 0    ketoconazole (NIZORAL) 2 % external cream, Apply topically daily, Disp: 30 g, Rfl: 1    meclizine (ANTIVERT) 25 MG tablet, Take 1 tablet (25 mg) by mouth 3 times daily as needed for dizziness, Disp: 30 tablet, Rfl: 1    ondansetron (ZOFRAN ODT) 4 MG ODT tab, Take 1 tablet (4 mg) by mouth every 8 hours as needed for nausea, Disp: 20 tablet, Rfl: 0    dicyclomine (BENTYL) 20 MG tablet, Take 1 tablet (20 mg) by mouth 4 times daily as needed (abdominal pain) (Patient not taking: Reported on 5/11/2023), Disp: 20 tablet, Rfl: 0      Objective:   Allergies:  Blood-group specific substance and Iodinated contrast media [iodinated contrast media]    Vitals:  Vitals:    10/23/23 1415   BP: 111/70   BP Location: Left arm   Patient Position: Sitting   Cuff Size: Adult  "Regular   Pulse: 64   Resp: 16   Temp: 98.2  F (36.8  C)   TempSrc: Temporal   SpO2: 100%   Weight: 58.5 kg (129 lb)   Height: 1.549 m (5' 1\")       Body mass index is 24.37 kg/m .    Vital signs reviewed.  General: Patient is alert and oriented x 3, in no apparent distress  Ears: TMs are non-erythematous with good light reflex bilaterally  Throat: no erythema, edema or exudate noted  Lymphatic: no anterior cervical lymph node enlargement  Cardiac: regular rate and rhythm, no murmurs  Pulmonary: lungs clear to auscultation bilaterally, no crackles, rales, rhonchi, or wheezing noted    Results for orders placed or performed in visit on 10/23/23   Streptococcus A Rapid Screen w/Reflex to PCR     Status: Normal    Specimen: Throat; Swab   Result Value Ref Range    Group A Strep antigen Negative Negative         Assessment and Plan:   1. Acute pharyngitis, unspecified etiology  Strep test pending.  Given her direct exposure to someone who was recently diagnosed with strep throat, we discussed treating regardless.  She would like to be treated.  Prescription sent for amoxicillin.  - Streptococcus A Rapid Screen w/Reflex to PCR  - amoxicillin (AMOXIL) 500 MG capsule; Take 1 capsule (500 mg) by mouth 2 times daily for 10 days  Dispense: 20 capsule; Refill: 0  - Group A Streptococcus PCR Throat Swab       This dictation uses voice recognition software, which may contain typographical errors.    "

## 2023-10-23 NOTE — TELEPHONE ENCOUNTER
I really cannot see anything in the shade of the pictures.  I would have her submit an E-visit and outline her symptoms and have a test done prior to treatment.

## 2024-02-01 PROBLEM — R10.2 PAIN IN PELVIS: Status: RESOLVED | Noted: 2020-09-02 | Resolved: 2024-02-01

## 2024-02-02 ENCOUNTER — OFFICE VISIT (OUTPATIENT)
Dept: FAMILY MEDICINE | Facility: CLINIC | Age: 44
End: 2024-02-02
Payer: COMMERCIAL

## 2024-02-02 VITALS
BODY MASS INDEX: 25.11 KG/M2 | OXYGEN SATURATION: 98 % | TEMPERATURE: 98.4 F | HEART RATE: 70 BPM | DIASTOLIC BLOOD PRESSURE: 70 MMHG | HEIGHT: 61 IN | WEIGHT: 133 LBS | RESPIRATION RATE: 16 BRPM | SYSTOLIC BLOOD PRESSURE: 120 MMHG

## 2024-02-02 DIAGNOSIS — Z12.31 VISIT FOR SCREENING MAMMOGRAM: ICD-10-CM

## 2024-02-02 DIAGNOSIS — Z23 ENCOUNTER FOR VACCINATION: ICD-10-CM

## 2024-02-02 DIAGNOSIS — R16.0 LIVER MASS: Primary | ICD-10-CM

## 2024-02-02 PROCEDURE — 90715 TDAP VACCINE 7 YRS/> IM: CPT | Performed by: FAMILY MEDICINE

## 2024-02-02 PROCEDURE — 90471 IMMUNIZATION ADMIN: CPT | Performed by: FAMILY MEDICINE

## 2024-02-02 PROCEDURE — 99213 OFFICE O/P EST LOW 20 MIN: CPT | Mod: 25 | Performed by: FAMILY MEDICINE

## 2024-02-02 ASSESSMENT — PAIN SCALES - GENERAL: PAINLEVEL: NO PAIN (0)

## 2024-02-02 NOTE — PROGRESS NOTES
Assessment/Plan:    Liver mass  Liver mass on CT scans from May, seems to be enlarging from 2008 scans, possibly hemangioma but will evaluate further with MRI as recommended, ordered today.   - MR Liver wo & w Contrast    Visit for screening mammogram  Due for mammogram, ordered today.  - MA Screen with Implants Bilateral w/Gary    Encounter for vaccination  Due for Tdap, administered today.  - TDAP 10-64Y (ADACEL,BOOSTRIX)       Follow up: pending test results    Vianey Lux MD  [facility]    Subjective:   Orquidea Mckeon is a 43 year old female is here today for follow-up     -May 2023 had flare of bad abdominal pain - was in ER twice, CT scans both time show colitis and liver mass  -had a small flare of pain but only lasted a day and then went away - no concerns at this time  -uses products to help with regular bowel movements - has been working well  -pt reports has known about having a liver hemangioma for past 10 years - also runs in family (not always in liver) - has been worried about this, growing in size it seems    -5/21/18 colonoscopy at  (CE - no polyps - 10 yrs for next one)    Answers submitted by the patient for this visit:  General Questionnaire (Submitted on 2/2/2024)  Chief Complaint: Chronic problems general questions HPI Form  What is the reason for your visit today? : Colitist/ hemangioma on liver  How many servings of fruits and vegetables do you eat daily?: 2-3  On average, how many sweetened beverages do you drink each day (Examples: soda, juice, sweet tea, etc.  Do NOT count diet or artificially sweetened beverages)?: 0  How many minutes a day do you exercise enough to make your heart beat faster?: 30 to 60  How many days a week do you exercise enough to make your heart beat faster?: 4  How many days per week do you miss taking your medication?: 0    Patient Active Problem List   Diagnosis    Herpes Simplex Type I    Pelvic Pain    Gastroesophageal reflux disease,  esophagitis presence not specified    PMDD (premenstrual dysphoric disorder)    Hemangioma of liver    Eczema    Lightheadedness    Dizziness     Past Medical History:   Diagnosis Date    Hemangioma     location: liver     Past Surgical History:   Procedure Laterality Date    APPENDECTOMY      BREAST SURGERY      LAPAROSCOPIC ASSISTED HYSTERECTOMY VAGINAL N/A 2020    Procedure: LAPAROSCOPIC ASSISTED VAGINAL HYSTERECTOMY BILATERAL SALPINGECTOMY CYSTOSCOPY;  Surgeon: Chelsea Mitchell MD;  Location: [facility] [place];  Service: Gynecology    [facility] LIGATE FALLOPIAN TUBE      Description: Tubal Ligation;  Recorded: 2013;  Comments: for sterilization on      Current Outpatient Medications   Medication    albuterol (PROAIR HFA/PROVENTIL HFA/VENTOLIN HFA) 108 (90 Base) MCG/ACT inhaler    desvenlafaxine succinate (PRISTIQ) 25 MG 24 hr tablet    dicyclomine (BENTYL) 20 MG tablet    diphenhydrAMINE (BENADRYL ALLERGY) 25 MG capsule    ketoconazole (NIZORAL) 2 % external cream     No current facility-administered medications for this visit.     Allergies   Allergen Reactions    Blood-Group Specific Substance Unknown     Anti-D   present. Expect delays in blood for transfusion.  Draw 2 lavender  for type and screen orders.    Iodinated Contrast Media [Iodinated Contrast Media] Hives     Social History     Socioeconomic History    Marital status:      Spouse name: Not on file    Number of children: Not on file    Years of education: Not on file    Highest education level: Not on file   Occupational History    Not on file   Tobacco Use    Smoking status: Former     Types: Cigarettes     Quit date: 3/4/2006     Years since quittin.9    Smokeless tobacco: Never   Vaping Use    Vaping Use: Never used   Substance and Sexual Activity    Alcohol use: Yes     Alcohol/week: 1.0 standard drink of alcohol     Comment: rare    Drug use: No    Sexual activity: Yes     Partners: Male     Birth control/protection:  Surgical   Other Topics Concern    Not on file   Social History Narrative    Not on file     Social Determinants of Health     Financial Resource Strain: Low Risk  (2/2/2024)    Financial Resource Strain     Within the past 12 months, have you or your family members you live with been unable to get utilities (heat, electricity) when it was really needed?: No   Food Insecurity: Low Risk  (2/2/2024)    Food Insecurity     Within the past 12 months, did you worry that your food would run out before you got money to buy more?: No     Within the past 12 months, did the food you bought just not last and you didn t have money to get more?: No   Transportation Needs: Low Risk  (2/2/2024)    Transportation Needs     Within the past 12 months, has lack of transportation kept you from medical appointments, getting your medicines, non-medical meetings or appointments, work, or from getting things that you need?: No   Physical Activity: Not on file   Stress: Not on file   Social Connections: Not on file   Interpersonal Safety: Low Risk  (2/2/2024)    Interpersonal Safety     Do you feel physically and emotionally safe where you currently live?: Yes     Within the past 12 months, have you been hit, slapped, kicked or otherwise physically hurt by someone?: No     Within the past 12 months, have you been humiliated or emotionally abused in other ways by your partner or ex-partner?: No   Housing Stability: Low Risk  (2/2/2024)    Housing Stability     Do you have housing? : Yes     Are you worried about losing your housing?: No     Family History   Problem Relation Age of Onset    Hemangiomas Sister     Hemangiomas Brother     Breast Cancer Maternal Grandmother     Kidney Disease Maternal Grandfather     Alcoholism Maternal Grandfather     Breast Cancer Maternal Aunt      Review of systems is as stated in HPI, and the remainder of system review is otherwise negative.    Objective:     /70   Pulse 70   Temp 98.4  F (36.9  C)  "(Temporal)   Resp 16   Ht 1.549 m (5' 1\")   Wt 60.3 kg (133 lb)   LMP  (LMP Unknown)   SpO2 98%   BMI 25.13 kg/m      General appearance: awake, NAD  HEENT: atraumatic, normocephalic, no scleral icterus or injection  Lungs: breathing comfortably on room air  Extremities: no LE edema bilaterally, moving all extremities  Neuro: alert, oriented x3, CNs grossly intact, no focal deficits appreciated  Psych: normal mood/affect/behavior, answering questions appropriately, linear thought process  "

## 2024-02-04 DIAGNOSIS — R45.4 IRRITABILITY: ICD-10-CM

## 2024-02-05 RX ORDER — DESVENLAFAXINE 25 MG/1
25 TABLET, EXTENDED RELEASE ORAL DAILY
Qty: 90 TABLET | Refills: 1 | Status: SHIPPED | OUTPATIENT
Start: 2024-02-05 | End: 2024-08-09

## 2024-02-06 ENCOUNTER — MYC MEDICAL ADVICE (OUTPATIENT)
Dept: FAMILY MEDICINE | Facility: CLINIC | Age: 44
End: 2024-02-06
Payer: COMMERCIAL

## 2024-02-06 DIAGNOSIS — G56.00 CARPAL TUNNEL SYNDROME, UNSPECIFIED LATERALITY: Primary | ICD-10-CM

## 2024-02-20 ENCOUNTER — ANCILLARY PROCEDURE (OUTPATIENT)
Dept: MAMMOGRAPHY | Facility: HOSPITAL | Age: 44
End: 2024-02-20
Attending: FAMILY MEDICINE
Payer: COMMERCIAL

## 2024-02-20 ENCOUNTER — HOSPITAL ENCOUNTER (OUTPATIENT)
Dept: MRI IMAGING | Facility: HOSPITAL | Age: 44
Discharge: HOME | End: 2024-02-20
Attending: FAMILY MEDICINE
Payer: COMMERCIAL

## 2024-02-20 DIAGNOSIS — Z12.31 VISIT FOR SCREENING MAMMOGRAM: ICD-10-CM

## 2024-02-20 DIAGNOSIS — R16.0 LIVER MASS: ICD-10-CM

## 2024-02-20 PROCEDURE — 77067 SCR MAMMO BI INCL CAD: CPT

## 2024-02-20 PROCEDURE — A9585 GADOBUTROL INJECTION: HCPCS | Performed by: FAMILY MEDICINE

## 2024-02-20 PROCEDURE — 255N000002 HC RX 255 OP 636: Performed by: FAMILY MEDICINE

## 2024-02-20 PROCEDURE — 74183 MRI ABD W/O CNTR FLWD CNTR: CPT

## 2024-02-20 RX ORDER — GADOBUTROL 604.72 MG/ML
0.1 INJECTION INTRAVENOUS ONCE
Status: COMPLETED | OUTPATIENT
Start: 2024-02-20 | End: 2024-02-20

## 2024-02-20 RX ADMIN — GADOBUTROL 6 ML: 604.72 INJECTION INTRAVENOUS at 14:18

## 2024-02-25 ENCOUNTER — MYC MEDICAL ADVICE (OUTPATIENT)
Dept: FAMILY MEDICINE | Facility: CLINIC | Age: 44
End: 2024-02-25
Payer: COMMERCIAL

## 2024-02-26 ENCOUNTER — NURSE TRIAGE (OUTPATIENT)
Dept: NURSING | Facility: CLINIC | Age: 44
End: 2024-02-26
Payer: COMMERCIAL

## 2024-02-26 ENCOUNTER — TELEPHONE (OUTPATIENT)
Dept: PLASTIC SURGERY | Facility: CLINIC | Age: 44
End: 2024-02-26
Payer: COMMERCIAL

## 2024-02-26 DIAGNOSIS — T85.9XXA COMPLICATION OF BREAST IMPLANT: Primary | ICD-10-CM

## 2024-02-26 DIAGNOSIS — T85.43XA RUPTURED LEFT BREAST IMPLANT, INITIAL ENCOUNTER: Primary | ICD-10-CM

## 2024-02-26 NOTE — TELEPHONE ENCOUNTER
Case discussed with Dr. Mccrary. Call was placed to patient and patient was offered appointment on Wednesday 02/28/2024 at 3:15pm at our [place] office. Prior to this appointment Dr. Mccrary would like a bilateral breast ultrasound done. Verbal order was received and documented. Patient notified and MyChart message sent. Patient encouraged to follow-up in ER should symptoms such as infection, difficulty breathing or severe pain occur.     Patient verbalized understanding and  appreciative of call back.     Fabi Posadas RN on 2/26/2024 at 4:37 PM

## 2024-02-26 NOTE — TELEPHONE ENCOUNTER
Provider Recommendation Follow Up:   Reached patient/caregiver. Informed of provider's recommendations. Patient verbalized understanding and agrees with the plan.           REBECA Koenig, RN  [facility]

## 2024-02-26 NOTE — TELEPHONE ENCOUNTER
Writer huddled with Dr. Ceron as  was unavailable and Dr. Lux is not in clinic today, 2/26/24.    Dr. Ceron stated that if she is showing signs of infection in her left breast, such as fever, swelling, or increased pain, then the patient should be evaluated in the ER.    If the patient is not showing symptoms of increased pain or signs of infection, it would be appropriate for Dr. Lux to address the concern tomorrow, 2/27/24, Tuesday, when she is back in clinic.    Dr. Ceron is not sure if Dr. Lux would like to evaluate her in person, or if the patient should be seen by a plastic surgeon.    Writer called and relayed the above information to the patient     Provider Recommendation Follow Up:   Reached patient/caregiver. Informed of provider's recommendations. Patient verbalized understanding and agrees with the plan.         Patient stated that it is uncomfortable in her muscle and she has to shift the bag around sometimes as it gets wedged up in her left armpit, which is the worst of the pain.    Has been wearing a bra on to keep the deflated implant from shifting.    Rated pain as a 2 out of ten right now.    Denies any signs of infection or severe pain right now and patient is comfortable with waiting until tomorrow, 2/27/24, to see what Dr. Lux recommends.    Writer also pended a referral to plastic surgery for the PCP to review and advise next steps.    Writer will route the above information to the PCP to review and advise next steps.    Yareli Pink RN, BSN  [facility]

## 2024-02-26 NOTE — TELEPHONE ENCOUNTER
Health Call Center    Phone Message    May a detailed message be left on voicemail: yes     Reason for Call: Appointment Intake    Referring Provider Name: Vianey Lux MD in  [facility]    Diagnosis and/or Symptoms: Complication of breast implant [T85.9XXA]. pt reports rupture/issue with implant following mammogram on 2/23    Dx not listed on Protocols, no answer on Priority Line, pt preference is [facility] if able.     Please review and call pt back to schedule. Thank you!    Action Taken: Message routed to:  [facility] CLINIC [facility]LASTIC [facility]URS[facility]- [[id number]] and PLASTIC SURGERY [facility] SUPPORT [place] [[id number]]    Travel Screening: Not Applicable

## 2024-02-26 NOTE — TELEPHONE ENCOUNTER
"Nurse Triage SBAR    Please call patient [phone] (home) Patient is requesting call back today.      Situation:   Patient reporting her breast implant was damaged during mammo and the saline has leaked out completely.  Patient stating she does not have a breast surgeon.   Requesting advice on where to go. Please advise on referral or if you would see her in clinic.    Background:   See My Chart Message from 2/25/24  Mammo done Friday 2/23/24  Breast implants 2005    Assessment:  Reporting left breast implant has drained \"all of the saline.\" Symptoms occurring during mammo on 2/23/24.  Reporting area feels \"irritated.\" Denies pain. Stating she feels like she has to \"move the bag\" in left breast intermittently. Afebrile.  Denies redness.    Protocol Recommended Disposition:   See with in 3 days. Message routed to provider/Care Team.    Recommendation:     Routed to provider    Does the patient meet one of the following criteria for ADS visit consideration? 16+ years old, with an [facility] PCP     TIP  Providers, please consider if this condition is appropriate for management at one of our Acute and Diagnostic Services sites.     If patient is a good candidate, please use dotphrase <dot>triageresponse and select Refer to ADS to document.  Reason for Disposition   Patient wants to be seen    Additional Information   Negative: SEVERE breast pain and fever > 103 F  (39.4 C)   Negative: Patient sounds very sick or weak to the triager   Negative: Breast looks infected (spreading redness, feels hot or painful to touch) and fever   Negative: Breast looks infected (spreading redness, feels hot or painful to touch) and no fever   Negative: Painful rash and multiple small blisters grouped together (i.e., dermatomal distribution or \"band\" or \"stripe\")   Negative: Cuts, burns, or bruises of breasts and suspicious history for the injury   Negative: Breast lump   Negative: Nipple discharge and bloody   Negative: Nipple is inverted " (i.e., points inward)  (Exception: Long-term physical characteristic, present for many years.)   Negative: Dry flaking-peeling skin of nipple   Negative: Change in shape or appearance of breast   Negative: Dimpling of skin of breast (i.e., skin looks like the outside of an orange peel)    Protocols used: Breast Symptoms-A-[place]

## 2024-02-26 NOTE — TELEPHONE ENCOUNTER
Agree with plan - if having significant pain or signs of infection then needs to go to ER. Otherwise I will place referral to plastic surgery now - if our plastic surgery team cannot get pt in quickly then she should call other private plastic surgery clinics and we can fax referral wherever she chooses to go.    Dr Lux

## 2024-02-28 ENCOUNTER — OFFICE VISIT (OUTPATIENT)
Dept: PLASTIC SURGERY | Facility: AMBULATORY SURGERY CENTER | Age: 44
End: 2024-02-28
Payer: COMMERCIAL

## 2024-02-28 ENCOUNTER — ANCILLARY PROCEDURE (OUTPATIENT)
Dept: MAMMOGRAPHY | Facility: CLINIC | Age: 44
End: 2024-02-28
Attending: PLASTIC SURGERY
Payer: COMMERCIAL

## 2024-02-28 VITALS
SYSTOLIC BLOOD PRESSURE: 118 MMHG | DIASTOLIC BLOOD PRESSURE: 70 MMHG | BODY MASS INDEX: 24.73 KG/M2 | HEIGHT: 61 IN | WEIGHT: 131 LBS

## 2024-02-28 DIAGNOSIS — T85.43XA RUPTURED LEFT BREAST IMPLANT, INITIAL ENCOUNTER: ICD-10-CM

## 2024-02-28 DIAGNOSIS — T85.9XXA COMPLICATION OF BREAST IMPLANT: Primary | ICD-10-CM

## 2024-02-28 DIAGNOSIS — Z98.82 HISTORY OF BREAST AUGMENTATION: ICD-10-CM

## 2024-02-28 PROCEDURE — 76642 ULTRASOUND BREAST LIMITED: CPT | Mod: 50

## 2024-02-28 PROCEDURE — 99204 OFFICE O/P NEW MOD 45 MIN: CPT | Performed by: PLASTIC SURGERY

## 2024-02-28 NOTE — PROGRESS NOTES
Chief complaint:  Deflated left breast saline implant    History of present illness:  This is a 43 year old lady who presents with a deflated left breast saline implant.  She noticed that her left breast implant became deflated last Friday, February 23, after she underwent a screening mammogram.  Since that time, patient noticed decreased volume on her left breast implant as well as pain.    She has history of bilateral subpectoral breast augmentation with saline implants, of unknown volume, in 2005.    Past medical history:  Past Medical History:   Diagnosis Date    Hemangioma     location: liver       Past surgical history:  Bilateral breast augmentation, open appendectomy, total abdominal hysterectomy, excision of ovarian cyst.      Allergies:  No known drug allergies    Medications:    Current Outpatient Medications:     albuterol (PROAIR HFA/PROVENTIL HFA/VENTOLIN HFA) 108 (90 Base) MCG/ACT inhaler, Inhale 1-2 puffs into the lungs every 4 hours as needed for shortness of breath / dyspnea or wheezing, Disp: 8.5 g, Rfl: 0    desvenlafaxine succinate (PRISTIQ) 25 MG 24 hr tablet, TAKE 1 TABLET(25 MG) BY MOUTH DAILY, Disp: 90 tablet, Rfl: 1    dicyclomine (BENTYL) 20 MG tablet, Take 1 tablet (20 mg) by mouth 4 times daily as needed (abdominal pain), Disp: 20 tablet, Rfl: 0    diphenhydrAMINE (BENADRYL ALLERGY) 25 MG capsule, Administer 30 min - 2 hours pre - IV contrast injection, Disp: 2 capsule, Rfl: 0    ketoconazole (NIZORAL) 2 % external cream, Apply topically daily, Disp: 30 g, Rfl: 1    Family history:  Noncontributory    GYN history:  G2, P2    Social History:  Denies tobacco, drinks alcohol socially.    Review of systems:  General ROS: No complaints or constitutional symptoms  Skin: No complaints or symptoms   Hematologic/Lymphatic: No symptoms or complaints  Psychiatric: No symptoms or complaints  Endocrine: No excessive fatigue, no hypermetabolic symptoms reported  Respiratory ROS: No cough, shortness of  "breath, or wheezing  Cardiovascular ROS: No chest pain or dyspnea on exertion  Breast ROS: Denies palpable breast masses, denies nipple discharge, denies peau d'orange  Gastrointestinal ROS: No abdominal pain, nausea, diarrhea, or constipation  Musculoskeletal ROS: No recent injuries reported  Neurological ROS: No focal neurologic defects reported.      Physical exam:  /70   Ht 1.549 m (5' 1\")   Wt 59.4 kg (131 lb)   LMP  (LMP Unknown)   BMI 24.75 kg/m    General: Alert, cooperative, appears stated age   Skin: Skin color, texture, turgor normal, no rashes or lesions   Lymphatic: No obvious adenopathy, no swelling   Eyes: No scleral icterus, pupils equal  HENT: No traumatic injury to the head or face, no gross abnormalities  Lungs: Normal respiratory effort, breath sounds equal bilaterally  Heart: Regular rate and rhythm  Breasts: Obvious breast asymmetry.  Left breast is smaller, with loss of upper pole fullness.  She presents with bilateral grade 2 ptosis, no nipple inversion, no nipple discharge, no peau d'orange, no obvious palpable masses.  Patient present with Baker 1 capsule bilaterally.  She has well-healed inframammary fold incision scars.  On the right breast the sternal notch to nipple distance is 26 cm, nipple to inframammary fold distance is 10 cm and breast diameter is 27 cm.  On the left breast, the sternal notch to nipple distance is 27 cm, nipple to inframammary fold distance is 8 cm and breast diameter is 23 cm.  No palpable axillary lymphadenopathies bilaterally.  Abdomen: Soft, non-distended and non-tender to palpation  Neurologic: Grossly intact                        Radiological studies:    1) US BREAST BILATERAL LIMITED 1-3 QUADRANTS   2/28/2024 8:55 AM CST     INDICATION: Patient had mammogram on 02 23 2024 the left saline implant has collapsed.  COMPARISON: Mammogram from 02/20/2024     ULTRASOUND FINDINGS: Targeted bilateral breast ultrasound was performed. On the right a " normal-appearing saline implant was identified in all 4 quadrants. On the left the implant is collapsed with no saline visible. There is no gross evidence of   malignancy on images provided.                                                                      IMPRESSION:  Findings consistent with left breast saline implant rupture.     ACR BI-RADS Category 2: Benign.    2) Bilateral mammograms from 2/20/2024 BI-RADS Category 2, benign.        ASSESSMENT:    This is a 43 year old lady with history of bilateral subpectoral breast augmentation with saline implants.    She presents with a deflated left breast implant.    Furthermore, she presents with bilateral grade 2 ptosis.        PLAN:      I have explained to the patient and her  that she has 3 options:    Option #1: Bilateral breast implant explantations    Option #2: Bilateral breast implant explantations with bilateral mastopexies with Wise pattern and superior medial pedicle.    Option #3: Bilateral breast implant explantations, breast implant exchange for silicone implant (potential volumes: 415 cc, 385 cc, 365 cc and 345 cc, full height.  Corresponding sizers will be brought as well) and bilateral mastopexies with Wise pattern and superior medial pedicle.    I have explained to the patient that most likely her insurance will cover her explantations.  However, the breast lift (mastopexy) with or without breast implant exchange, will have to be covered by the patient herself.  Patient told me that she understood this.    Risks were explained to the patient and her  and they include but are not limited to scarring, infection, potential bilateral breast implant explantation (if implants are placed and get infected), bleeding, hematoma, seroma, nipple ischemia, nipple necrosis, temporary versus permanent altered sensation on nipple-areolar complexes, capsular contracture, need for further surgeries.    Patient has acknowledged all these risks and is  still interested in pursuing surgery.    Our  will contact her to discuss pricing and then she will get back to us.    Time spent with the patient 45 minutes.      Pankaj Mccrary MD, FACS   Diplomate American Board of Plastic Surgery  Diplomate American Board of Surgery  [facility]  Division of Plastic & Reconstructive Surgery  Office: [phone]   2/28/2024 at 3:54 PM

## 2024-02-28 NOTE — LETTER
2/28/2024         RE: Orquidea Mckeon  [address] [facility] [address]        Dear Colleague,    Thank you for referring your patient, Orquidea Mckeon, to the [facility] PLASTIC SURGERY [facility]INIC [place]. Please see a copy of my visit note below.    Chief complaint:  Deflated left breast saline implant    History of present illness:  This is a 43 year old lady who presents with a deflated left breast saline implant.  She noticed that her left breast implant became deflated last Friday, February 23, after she underwent a screening mammogram.  Since that time, patient noticed decreased volume on her left breast implant as well as pain.    She has history of bilateral subpectoral breast augmentation with saline implants, of unknown volume, in 2005.    Past medical history:  Past Medical History:   Diagnosis Date     Hemangioma     location: liver       Past surgical history:  Bilateral breast augmentation, open appendectomy, total abdominal hysterectomy, excision of ovarian cyst.      Allergies:  No known drug allergies    Medications:    Current Outpatient Medications:      albuterol (PROAIR HFA/PROVENTIL HFA/VENTOLIN HFA) 108 (90 Base) MCG/ACT inhaler, Inhale 1-2 puffs into the lungs every 4 hours as needed for shortness of breath / dyspnea or wheezing, Disp: 8.5 g, Rfl: 0     desvenlafaxine succinate (PRISTIQ) 25 MG 24 hr tablet, TAKE 1 TABLET(25 MG) BY MOUTH DAILY, Disp: 90 tablet, Rfl: 1     dicyclomine (BENTYL) 20 MG tablet, Take 1 tablet (20 mg) by mouth 4 times daily as needed (abdominal pain), Disp: 20 tablet, Rfl: 0     diphenhydrAMINE (BENADRYL ALLERGY) 25 MG capsule, Administer 30 min - 2 hours pre - IV contrast injection, Disp: 2 capsule, Rfl: 0     ketoconazole (NIZORAL) 2 % external cream, Apply topically daily, Disp: 30 g, Rfl: 1    Family history:  Noncontributory    GYN history:  G2, P2    Social History:  Denies tobacco, drinks alcohol socially.    Review of systems:  General ROS: No  "complaints or constitutional symptoms  Skin: No complaints or symptoms   Hematologic/Lymphatic: No symptoms or complaints  Psychiatric: No symptoms or complaints  Endocrine: No excessive fatigue, no hypermetabolic symptoms reported  Respiratory ROS: No cough, shortness of breath, or wheezing  Cardiovascular ROS: No chest pain or dyspnea on exertion  Breast ROS: Denies palpable breast masses, denies nipple discharge, denies peau d'orange  Gastrointestinal ROS: No abdominal pain, nausea, diarrhea, or constipation  Musculoskeletal ROS: No recent injuries reported  Neurological ROS: No focal neurologic defects reported.      Physical exam:  /70   Ht 1.549 m (5' 1\")   Wt 59.4 kg (131 lb)   LMP  (LMP Unknown)   BMI 24.75 kg/m    General: Alert, cooperative, appears stated age   Skin: Skin color, texture, turgor normal, no rashes or lesions   Lymphatic: No obvious adenopathy, no swelling   Eyes: No scleral icterus, pupils equal  HENT: No traumatic injury to the head or face, no gross abnormalities  Lungs: Normal respiratory effort, breath sounds equal bilaterally  Heart: Regular rate and rhythm  Breasts: Obvious breast asymmetry.  Left breast is smaller, with loss of upper pole fullness.  She presents with bilateral grade 2 ptosis, no nipple inversion, no nipple discharge, no peau d'orange, no obvious palpable masses.  Patient present with Baker 1 capsule bilaterally.  She has well-healed inframammary fold incision scars.  On the right breast the sternal notch to nipple distance is 26 cm, nipple to inframammary fold distance is 10 cm and breast diameter is 27 cm.  On the left breast, the sternal notch to nipple distance is 27 cm, nipple to inframammary fold distance is 8 cm and breast diameter is 23 cm.  No palpable axillary lymphadenopathies bilaterally.  Abdomen: Soft, non-distended and non-tender to palpation  Neurologic: Grossly intact                        Radiological studies:    1) US BREAST BILATERAL " LIMITED 1-3 QUADRANTS   2/28/2024 8:55 AM CST     INDICATION: Patient had mammogram on 02 23 2024 the left saline implant has collapsed.  COMPARISON: Mammogram from 02/20/2024     ULTRASOUND FINDINGS: Targeted bilateral breast ultrasound was performed. On the right a normal-appearing saline implant was identified in all 4 quadrants. On the left the implant is collapsed with no saline visible. There is no gross evidence of   malignancy on images provided.                                                                      IMPRESSION:  Findings consistent with left breast saline implant rupture.     ACR BI-RADS Category 2: Benign.    2) Bilateral mammograms from 2/20/2024 BI-RADS Category 2, benign.        ASSESSMENT:    This is a 43 year old lady with history of bilateral subpectoral breast augmentation with saline implants.    She presents with a deflated left breast implant.    Furthermore, she presents with bilateral grade 2 ptosis.        PLAN:      I have explained to the patient and her  that she has 3 options:    Option #1: Bilateral breast implant explantations    Option #2: Bilateral breast implant explantations with bilateral mastopexies with Wise pattern and superior medial pedicle.    Option #3: Bilateral breast implant explantations, breast implant exchange for silicone implant (potential volumes: 415 cc, 385 cc, 365 cc and 345 cc, full height.  Corresponding sizers will be brought as well) and bilateral mastopexies with Wise pattern and superior medial pedicle.    I have explained to the patient that most likely her insurance will cover her explantations.  However, the breast lift (mastopexy) with or without breast implant exchange, will have to be covered by the patient herself.  Patient told me that she understood this.    Risks were explained to the patient and her  and they include but are not limited to scarring, infection, potential bilateral breast implant explantation (if implants  are placed and get infected), bleeding, hematoma, seroma, nipple ischemia, nipple necrosis, temporary versus permanent altered sensation on nipple-areolar complexes, capsular contracture, need for further surgeries.    Patient has acknowledged all these risks and is still interested in pursuing surgery.    Our  will contact her to discuss pricing and then she will get back to us.    Time spent with the patient 45 minutes.      Pankaj Mccrary MD, FACS   Diplomate American Board of Plastic Surgery  Diplomate American Board of Surgery  [facility]  Division of Plastic & Reconstructive Surgery  Office: [phone]   2/28/2024 at 3:54 PM        Again, thank you for allowing me to participate in the care of your patient.        Sincerely,        Pankaj Mccrary MD

## 2024-02-29 ENCOUNTER — DOCUMENTATION ONLY (OUTPATIENT)
Dept: PLASTIC SURGERY | Facility: AMBULATORY SURGERY CENTER | Age: 44
End: 2024-02-29
Payer: COMMERCIAL

## 2024-02-29 NOTE — PROGRESS NOTES
Surgical Prior-Auth (Pre-Scheduling)  DOS: TBD   Facility: [facility]   Insurance: [facility]   Plan Exclusion? -   Case # [id number]   CPT: 12112 Description: Implant Removal       Clinicals Provided:    Order: -   Visit Notes: yes   Results: -   Photos: yes   Other: -   Submitted Via: Online portal   Date Submitted: 2/29/2024       Follow-up Phone# [phone]   : Orquidea Swanson       Surgeon Name: Pankaj Mccrary   Surgeons NPI: [id number]     PA started to verify coverage of implant removal.  Pt will get an out of pocket quote from [facility] for the possible replacement procedure.

## 2024-03-02 ENCOUNTER — HEALTH MAINTENANCE LETTER (OUTPATIENT)
Age: 44
End: 2024-03-02

## 2024-03-05 ENCOUNTER — OFFICE VISIT (OUTPATIENT)
Dept: PLASTIC SURGERY | Facility: AMBULATORY SURGERY CENTER | Age: 44
End: 2024-03-05
Payer: COMMERCIAL

## 2024-03-05 DIAGNOSIS — T85.43XD RUPTURE OF IMPLANT OF LEFT BREAST, SUBSEQUENT ENCOUNTER: Primary | ICD-10-CM

## 2024-03-05 DIAGNOSIS — Z98.82 HISTORY OF BREAST AUGMENTATION: ICD-10-CM

## 2024-03-05 PROCEDURE — 99212 OFFICE O/P EST SF 10 MIN: CPT | Performed by: PLASTIC SURGERY

## 2024-03-05 NOTE — LETTER
3/5/2024         RE: Orquidea Mckeon  [address] [facility] [address]        Dear Colleague,    Thank you for referring your patient, Orquidea Mckeon, to the [facility] PLASTIC SURGERY CLINIC [place]. Please see a copy of my visit note below.    Mrs. Mckeon is a 43 years old lady with history of bilateral subpectoral breast augmentation with saline implants.  After a recent mammogram, patient developed deflation of her left breast implant.  She returns today for reevaluation for preparation for her upcoming surgery.    Patient complains at the physical exam, of pain on her left breast.  She has lost left breast upper pole fullness.    Plan: Patient is interested in bilateral breast implant explantation, bilateral breast mastopexies (mini Melgoza pattern) and replacement of the implant with a slightly smaller implants.    We are awaiting for insurance company's decision.  If she gets denied then I will attempt to appeal it.  However, patient understand that she may have to cover all expenses of her surgery.  We will await for final decision from her insurance company prior to surgery.    Pankaj Mccrary MD , FACS   Diplomate American Board of Plastic Surgery  Diplomate American Board of Surgery  Adj. Assistant Professor of Surgery  Division of Plastic & Reconstructive Surgery   [facility]  Office: [phone]   3/5/2024 at 6:49 PM       Again, thank you for allowing me to participate in the care of your patient.        Sincerely,        Pankaj Mccrary MD

## 2024-03-06 ENCOUNTER — MYC MEDICAL ADVICE (OUTPATIENT)
Dept: PLASTIC SURGERY | Facility: AMBULATORY SURGERY CENTER | Age: 44
End: 2024-03-06
Payer: COMMERCIAL

## 2024-03-06 NOTE — PROGRESS NOTES
Mrs. Mckeon is a 43 years old lady with history of bilateral subpectoral breast augmentation with saline implants.  After a recent mammogram, patient developed deflation of her left breast implant.  She returns today for reevaluation for preparation for her upcoming surgery.    Patient complains at the physical exam, of pain on her left breast.  She has lost left breast upper pole fullness.    Plan: Patient is interested in bilateral breast implant explantation, bilateral breast mastopexies (mini Melgoza pattern) and replacement of the implant with a slightly smaller implants.    We are awaiting for insurance company's decision.  If she gets denied then I will attempt to appeal it.  However, patient understand that she may have to cover all expenses of her surgery.  We will await for final decision from her insurance company prior to surgery.    Pankaj Mccrary MD , FACS   Diplomate American Board of Plastic Surgery  Diplomate American Board of Surgery  Adj. Assistant Professor of Surgery  Division of Plastic & Reconstructive Surgery   [facility]  Office: [phone]   3/5/2024 at 6:49 PM

## 2024-03-08 PROBLEM — T85.43XD: Status: ACTIVE | Noted: 2024-03-05

## 2024-03-08 PROBLEM — Z98.82 HISTORY OF BREAST AUGMENTATION: Status: ACTIVE | Noted: 2024-03-05

## 2024-03-08 NOTE — TELEPHONE ENCOUNTER
FMLA / EBEN or Short Term Disability Paperwork completed and signed on providers behalf.     Leave start date: 03/20/2024    Leave end date: 04/03/2024    RTW on 04/04/2024 with light duty restrictions through 05/01/2024    Forms e-mailed to patient at [email or web address]    Forms labeled and sent to HIM for scanning.    Fabi Posadas RN on 3/8/2024 at 8:36 AM

## 2024-03-15 RX ORDER — CEPHALEXIN 500 MG/1
500 CAPSULE ORAL 3 TIMES DAILY
Qty: 21 CAPSULE | Refills: 0 | Status: SHIPPED | OUTPATIENT
Start: 2024-03-15

## 2024-03-15 RX ORDER — OXYCODONE AND ACETAMINOPHEN 5; 325 MG/1; MG/1
1 TABLET ORAL EVERY 6 HOURS PRN
Qty: 21 TABLET | Refills: 0 | Status: SHIPPED | OUTPATIENT
Start: 2024-03-15 | End: 2024-03-20

## 2024-03-15 RX ORDER — DOCUSATE SODIUM 100 MG/1
100 CAPSULE, LIQUID FILLED ORAL 2 TIMES DAILY
Qty: 20 CAPSULE | Refills: 0 | Status: SHIPPED | OUTPATIENT
Start: 2024-03-15

## 2024-03-15 RX ORDER — ONDANSETRON 4 MG/1
4 TABLET, FILM COATED ORAL EVERY 6 HOURS PRN
Qty: 16 TABLET | Refills: 0 | Status: SHIPPED | OUTPATIENT
Start: 2024-03-15 | End: 2024-03-20

## 2024-03-15 RX ORDER — MUPIROCIN 20 MG/G
OINTMENT TOPICAL 3 TIMES DAILY
Qty: 30 G | Refills: 0 | Status: SHIPPED | OUTPATIENT
Start: 2024-03-15 | End: 2024-03-20

## 2024-03-15 NOTE — PROGRESS NOTES
Mrs. Mckeon is a 43 years old lady with history of subpectoral breast augmentation with saline implants of unknown volume.  Her left breast implant is deflated.    She is going to be scheduled for:    1) bilateral breast implant explantations,     2) breast implant exchange for silicone implant (potential volumes: 415 cc, 385 cc, 365 cc and 345 cc, full height.  I will bring for moderate profile implants: 520 cc, 485 cc, 445 cc 405 cc.  Corresponding sizers will be brought as well).    Patient is looking for a slightly smaller implants and she would like to keep upper pole fullness bilaterally.    3) and bilateral mastopexies with Wise pattern and superior medial pedicle.     Preoperative medications will be sent to her preferred pharmacy.    Pankaj Mccrary MD , FACS   Diplomate American Board of Plastic Surgery  Diplomate American Board of Surgery  Adj. Assistant Professor of Surgery  Division of Plastic & Reconstructive Surgery   [facility]  Office: [phone]   3/15/2024 at 3:33 PM

## 2024-03-18 ENCOUNTER — ANESTHESIA EVENT (OUTPATIENT)
Dept: SURGERY | Facility: AMBULATORY SURGERY CENTER | Age: 44
End: 2024-03-18

## 2024-03-18 ENCOUNTER — OFFICE VISIT (OUTPATIENT)
Dept: FAMILY MEDICINE | Facility: CLINIC | Age: 44
End: 2024-03-18
Payer: COMMERCIAL

## 2024-03-18 VITALS
WEIGHT: 134.38 LBS | OXYGEN SATURATION: 99 % | HEIGHT: 61 IN | HEART RATE: 67 BPM | SYSTOLIC BLOOD PRESSURE: 110 MMHG | DIASTOLIC BLOOD PRESSURE: 60 MMHG | BODY MASS INDEX: 25.37 KG/M2 | TEMPERATURE: 97.8 F | RESPIRATION RATE: 20 BRPM

## 2024-03-18 DIAGNOSIS — F41.9 ANXIETY: ICD-10-CM

## 2024-03-18 DIAGNOSIS — Z01.818 ENCOUNTER FOR PRE-OPERATIVE EXAMINATION: Primary | ICD-10-CM

## 2024-03-18 DIAGNOSIS — T85.43XA BREAST IMPLANT RUPTURE, INITIAL ENCOUNTER: ICD-10-CM

## 2024-03-18 LAB
HCG UR QL: NEGATIVE
HGB BLD-MCNC: 13.8 G/DL (ref 11.7–15.7)

## 2024-03-18 PROCEDURE — 85018 HEMOGLOBIN: CPT | Performed by: NURSE PRACTITIONER

## 2024-03-18 PROCEDURE — 81025 URINE PREGNANCY TEST: CPT | Performed by: NURSE PRACTITIONER

## 2024-03-18 PROCEDURE — 99214 OFFICE O/P EST MOD 30 MIN: CPT | Performed by: NURSE PRACTITIONER

## 2024-03-18 PROCEDURE — 36415 COLL VENOUS BLD VENIPUNCTURE: CPT | Performed by: NURSE PRACTITIONER

## 2024-03-18 ASSESSMENT — PAIN SCALES - GENERAL: PAINLEVEL: NO PAIN (0)

## 2024-03-18 NOTE — PROGRESS NOTES
Assessment & Plan     Encounter for pre-operative examination  Breast implant rupture, initial encounter  Reoperative examination completed today.  Patient will be undergoing removal of ruptured left breast implant and placement of new bilateral breast implants.  Her examination today is normal without any significant findings.  Her hemoglobin is stable and urine pregnancy test is negative.  I reviewed patient's medications and allergies and no contraindications to the procedure identified today.  She may proceed with scheduled procedure with choice of anesthesia.  - Hemoglobin  - HCG qualitative urine  - Hemoglobin  - HCG qualitative urine    Anxiety  Continues to do well on Pristiq.      Preoperative Evaluation  [facility]  [address]  [facility] [address]  Phone: [phone]  Fax: [phone]  Primary Provider: Vianey Lux  Pre-op Performing Provider: BRIAN SPENCER  Mar 18, 2024       Orquidea is a 43 year old, presenting for the following:  Pre-Op Exam        2/2/2024     1:07 PM   Additional Questions   Roomed by Wendy Rocha   Accompanied by Self     Surgical Information  Surgery/Procedure: Removal of ruptured breast implant,  Surgery Location: [facility]  Surgeon: Dr Thompson  Surgery Date: 3/20/2024  Time of Surgery: 5:45 am  Where patient plans to recover: At home with family  Fax number for surgical facility: [phone]    Assessment & Plan     The proposed surgical procedure is considered INTERMEDIATE risk.    Encounter for pre-operative examination  Breast implant rupture, initial encounter  Preoperative examination completed today.  Examination is without any concerning findings.  I reviewed patient's medications and allergies and no contraindications to the procedure identified today.  She may proceed with scheduled procedure with choice of anesthesia.  - Hemoglobin  - HCG qualitative urine  - Hemoglobin  - HCG  qualitative urine      Anxiety  Continues to do well on Pristiq          - No identified additional risk factors other than previously addressed    Antiplatelet or Anticoagulation Medication Instructions   - Patient is on no antiplatelet or anticoagulation medications.    Additional Medication Instructions  Patient will take all scheduled medications day of surgery.  She is advised to hold any NSAIDs, aspirin or vitamins and supplements from now until surgery.    Recommendation  APPROVAL GIVEN to proceed with proposed procedure, without further diagnostic evaluation.        Subjective       HPI related to upcoming procedure: Patient presents today for preoperative examination.  She will be undergoing removal of ruptured left breast implant and placement of new bilateral breast implants.  Her left breast implant ruptured during routine mammogram screening.  She denies any significant pain or discomfort currently.  She has a history of anxiety disorder which is managed with Pristiq.  She is otherwise on no chronic medications.  She denies any recent illnesses.  No history of bleeding or clotting disorders.  No history of adverse reactions as a result of anesthesia.          3/18/2024    11:20 AM   Preop Questions   1. Have you ever had a heart attack or stroke? No   2. Have you ever had surgery on your heart or blood vessels, such as a stent placement, a coronary artery bypass, or surgery on an artery in your head, neck, heart, or legs? No   3. Do you have chest pain with activity? No   4. Do you have a history of  heart failure? No   5. Do you currently have a cold, bronchitis or symptoms of other infection? No   6. Do you have a cough, shortness of breath, or wheezing? No   7. Do you or anyone in your family have previous history of blood clots? YES   8. Do you or does anyone in your family have a serious bleeding problem such as prolonged bleeding following surgeries or cuts? No   9. Have you ever had problems with  anemia or been told to take iron pills? No   10. Have you had any abnormal blood loss such as black, tarry or bloody stools, or abnormal vaginal bleeding? No   11. Have you ever had a blood transfusion? No   12. Are you willing to have a blood transfusion if it is medically needed before, during, or after your surgery? Yes   13. Have you or any of your relatives ever had problems with anesthesia? No   14. Do you have sleep apnea, excessive snoring or daytime drowsiness? No   15. Do you have any artifical heart valves or other implanted medical devices like a pacemaker, defibrillator, or continuous glucose monitor? No   16. Do you have artificial joints? No   17. Are you allergic to latex? No   18. Is there any chance that you may be pregnant? No       Health Care Directive  Patient does not have a Health Care Directive or Living Will: Discussed advance care planning with patient; information given to patient to review.    Preoperative Review of    reviewed - no record of controlled substances prescribed.      Status of Chronic Conditions:  See problem list for active medical problems.  Problems all longstanding and stable, except as noted/documented.  See ROS for pertinent symptoms related to these conditions.    Patient Active Problem List    Diagnosis Date Noted    History of breast augmentation 03/05/2024     Priority: Medium    Rupture of implant of left breast, subsequent encounter 03/05/2024     Priority: Medium    Lightheadedness 12/20/2022     Priority: Medium    Dizziness 12/20/2022     Priority: Medium    Eczema 09/02/2020     Priority: Medium    PMDD (premenstrual dysphoric disorder) 03/04/2019     Priority: Medium    Gastroesophageal reflux disease, esophagitis presence not specified 02/11/2019     Priority: Medium     IMO Regulatory Load OCT 2020        Herpes Simplex Type I      Priority: Medium     Created by Conversion  Replacement Utility updated for latest IMO load        Pelvic Pain       Priority: Medium     Created by Conversion  Replacement Utility updated for latest IMO load        Hemangioma of liver 09/18/2014     Priority: Medium      Past Medical History:   Diagnosis Date    Hemangioma     location: liver     Past Surgical History:   Procedure Laterality Date    APPENDECTOMY      BREAST SURGERY      LAPAROSCOPIC ASSISTED HYSTERECTOMY VAGINAL N/A 8/20/2020    Procedure: LAPAROSCOPIC ASSISTED VAGINAL HYSTERECTOMY BILATERAL SALPINGECTOMY CYSTOSCOPY;  Surgeon: Chelsea Mitchell MD;  Location: [facility];  Service: Gynecology    [facility] LIGATE FALLOPIAN TUBE      Description: Tubal Ligation;  Recorded: 06/27/2013;  Comments: for sterilization on 7/09     Current Outpatient Medications   Medication Sig Dispense Refill    albuterol (PROAIR HFA/PROVENTIL HFA/VENTOLIN HFA) 108 (90 Base) MCG/ACT inhaler Inhale 1-2 puffs into the lungs every 4 hours as needed for shortness of breath / dyspnea or wheezing 8.5 g 0    cephALEXin (KEFLEX) 500 MG capsule Take 1 capsule (500 mg) by mouth 3 times daily 21 capsule 0    chlorhexidine (HIBICLENS) 4 % liquid Apply topically daily as needed for wound care Please wash your chest including bilateral nipples, inframammary folds and armpits.  Begin the washings with the Hibiclens today and continue daily, including the morning of your surgery date. This is extremely important in order to prevent infections. 946 mL 0    desvenlafaxine succinate (PRISTIQ) 25 MG 24 hr tablet TAKE 1 TABLET(25 MG) BY MOUTH DAILY 90 tablet 1    dicyclomine (BENTYL) 20 MG tablet Take 1 tablet (20 mg) by mouth 4 times daily as needed (abdominal pain) 20 tablet 0    diphenhydrAMINE (BENADRYL ALLERGY) 25 MG capsule Administer 30 min - 2 hours pre - IV contrast injection 2 capsule 0    docusate sodium (COLACE) 100 MG capsule Take 1 capsule (100 mg) by mouth 2 times daily 20 capsule 0    ketoconazole (NIZORAL) 2 % external cream Apply topically daily 30 g 1    mupirocin (BACTROBAN) 2 %  "external ointment Apply topically 3 times daily Please apply antibiotic ointment to each nostril, 3 times a day, starting today, and continue daily, including the morning of your surgery date. This is extremely important in order to prevent infection. 30 g 0    ondansetron (ZOFRAN) 4 MG tablet Take 1 tablet (4 mg) by mouth every 6 hours as needed for nausea 16 tablet 0    oxyCODONE-acetaminophen (PERCOCET) 5-325 MG tablet Take 1 tablet by mouth every 6 hours as needed for pain 21 tablet 0       Allergies   Allergen Reactions    Blood-Group Specific Substance Unknown     Anti-D   present. Expect delays in blood for transfusion.  Draw 2 lavender  for type and screen orders.    Iodinated Contrast Media [Iodinated Contrast Media] Hives        Social History     Tobacco Use    Smoking status: Former     Types: Cigarettes     Quit date: 3/4/2006     Years since quittin.0    Smokeless tobacco: Never   Substance Use Topics    Alcohol use: Yes     Alcohol/week: 1.0 standard drink of alcohol     Comment: rare     Family History   Problem Relation Age of Onset    Hemangiomas Sister     Hemangiomas Brother     Breast Cancer Maternal Grandmother     Kidney Disease Maternal Grandfather     Alcoholism Maternal Grandfather     Breast Cancer Maternal Aunt      History   Drug Use No         Review of Systems    Review of Systems  Constitutional, neuro, ENT, endocrine, pulmonary, cardiac, gastrointestinal, genitourinary, musculoskeletal, integument and psychiatric systems are negative, except as otherwise noted.    Objective    /60   Pulse 67   Temp 97.8  F (36.6  C)   Resp 20   Ht 1.549 m (5' 1\")   Wt 61 kg (134 lb 6 oz)   LMP  (LMP Unknown)   SpO2 99%   BMI 25.39 kg/m     Estimated body mass index is 25.39 kg/m  as calculated from the following:    Height as of this encounter: 1.549 m (5' 1\").    Weight as of this encounter: 61 kg (134 lb 6 oz).  [unfilled]  GENERAL: alert and no distress  EYES: Eyes grossly " normal to inspection, PERRL and conjunctivae and sclerae normal  HENT: ear canals and TM's normal, nose and mouth without ulcers or lesions  NECK: no adenopathy, no asymmetry, masses, or scars  RESP: lungs clear to auscultation - no rales, rhonchi or wheezes  CV: regular rate and rhythm, normal S1 S2, no S3 or S4, no murmur, click or rub, no peripheral edema  ABDOMEN: soft, nontender, no hepatosplenomegaly, no masses and bowel sounds normal  MS: no gross musculoskeletal defects noted, no edema  SKIN: no suspicious lesions or rashes  NEURO: Normal strength and tone, mentation intact and speech normal  PSYCH: mentation appears normal, affect normal/bright    Recent Labs   Lab Test 03/18/24  1206 05/08/23  1608 05/01/23  0906   HGB 13.8 14.7 14.0   PLT  --  266 179   NA  --  142 140   POTASSIUM  --  3.6 3.8   CR  --  0.78 0.81        Diagnostics  Recent Results (from the past 24 hour(s))   Hemoglobin    Collection Time: 03/18/24 12:06 PM   Result Value Ref Range    Hemoglobin 13.8 11.7 - 15.7 g/dL      No EKG required, no history of coronary heart disease, significant arrhythmia, peripheral arterial disease or other structural heart disease.    Revised Cardiac Risk Index (RCRI)  The patient has the following serious cardiovascular risks for perioperative complications:   - No serious cardiac risks = 0 points     RCRI Interpretation: 0 points: Class I (very low risk - 0.4% complication rate)         Signed Electronically by: YANCY Souza CNP  Copy of this evaluation report is provided to requesting physician.

## 2024-03-18 NOTE — PROGRESS NOTES
Preoperative Evaluation  [facility]  [address]  [facility] [address]  Phone: [phone]  Fax: [phone]  Primary Provider: Vianey Lux  Pre-op Performing Provider: BRIAN SPENCER 18, 2024   {Provider  Link to PREOP SmartSet  Use this to apply standard patient instructions to AVS; includes medication directions, common orders, guidelines for anemia, warfarin, additional testing   :002065}    Orquidea is a 43 year old, presenting for the following:  Pre-Op Exam    {(!) Visit Details have not yet been documented.  Please enter Visit Details and then use this list to pull in documentation. (Optional):736653}  Surgical Information  Surgery/Procedure: Removal and replaced Breast Implants   Surgery Location: [facility] surgery center   Surgeon: Dr Thompson   Surgery Date: 03/20/2024  Time of Surgery: 5:45am  Where patient plans to recover: At home with family  Fax number for surgical facility: ***    {2021 Provider Charting Preference for Preop :776831}    Subjective       HPI related to upcoming procedure: ***        3/18/2024    11:20 AM   Preop Questions   1. Have you ever had a heart attack or stroke? No   2. Have you ever had surgery on your heart or blood vessels, such as a stent placement, a coronary artery bypass, or surgery on an artery in your head, neck, heart, or legs? No   3. Do you have chest pain with activity? No   4. Do you have a history of  heart failure? No   5. Do you currently have a cold, bronchitis or symptoms of other infection? No   6. Do you have a cough, shortness of breath, or wheezing? No   7. Do you or anyone in your family have previous history of blood clots? YES - ***   8. Do you or does anyone in your family have a serious bleeding problem such as prolonged bleeding following surgeries or cuts? No   9. Have you ever had problems with anemia or been told to take iron pills? No   10. Have you had any abnormal  blood loss such as black, tarry or bloody stools, or abnormal vaginal bleeding? No   11. Have you ever had a blood transfusion? No   12. Are you willing to have a blood transfusion if it is medically needed before, during, or after your surgery? Yes   13. Have you or any of your relatives ever had problems with anesthesia? No   14. Do you have sleep apnea, excessive snoring or daytime drowsiness? No   15. Do you have any artifical heart valves or other implanted medical devices like a pacemaker, defibrillator, or continuous glucose monitor? No   16. Do you have artificial joints? No   17. Are you allergic to latex? No   18. Is there any chance that you may be pregnant? No       Health Care Directive  Patient does not have a Health Care Directive or Living Will: {ADVANCE_DIRECTIVE_STATUS:579104}    Preoperative Review of   {Mnpmpreport:047881}  {Review MNPMP for all patients per [facility]CSI MNPMP Profile:276764}    {Chronic problem details (Optional) :804410}    Patient Active Problem List    Diagnosis Date Noted    History of breast augmentation 03/05/2024     Priority: Medium    Rupture of implant of left breast, subsequent encounter 03/05/2024     Priority: Medium    Lightheadedness 12/20/2022     Priority: Medium    Dizziness 12/20/2022     Priority: Medium    Eczema 09/02/2020     Priority: Medium    PMDD (premenstrual dysphoric disorder) 03/04/2019     Priority: Medium    Gastroesophageal reflux disease, esophagitis presence not specified 02/11/2019     Priority: Medium     IMO Regulatory Load OCT 2020        Herpes Simplex Type I      Priority: Medium     Created by Conversion  Replacement Utility updated for latest IMO load        Pelvic Pain      Priority: Medium     Created by Conversion  Replacement Utility updated for latest IMO load        Hemangioma of liver 09/18/2014     Priority: Medium      Past Medical History:   Diagnosis Date    Hemangioma     location: liver     Past Surgical History:   Procedure  Laterality Date    APPENDECTOMY      BREAST SURGERY      LAPAROSCOPIC ASSISTED HYSTERECTOMY VAGINAL N/A 8/20/2020    Procedure: LAPAROSCOPIC ASSISTED VAGINAL HYSTERECTOMY BILATERAL SALPINGECTOMY CYSTOSCOPY;  Surgeon: Chelsea Mitchell MD;  Location: [facility];  Service: Gynecology    [facility] LIGATE FALLOPIAN TUBE      Description: Tubal Ligation;  Recorded: 06/27/2013;  Comments: for sterilization on 7/09     Current Outpatient Medications   Medication Sig Dispense Refill    albuterol (PROAIR HFA/PROVENTIL HFA/VENTOLIN HFA) 108 (90 Base) MCG/ACT inhaler Inhale 1-2 puffs into the lungs every 4 hours as needed for shortness of breath / dyspnea or wheezing 8.5 g 0    cephALEXin (KEFLEX) 500 MG capsule Take 1 capsule (500 mg) by mouth 3 times daily 21 capsule 0    chlorhexidine (HIBICLENS) 4 % liquid Apply topically daily as needed for wound care Please wash your chest including bilateral nipples, inframammary folds and armpits.  Begin the washings with the Hibiclens today and continue daily, including the morning of your surgery date. This is extremely important in order to prevent infections. 946 mL 0    desvenlafaxine succinate (PRISTIQ) 25 MG 24 hr tablet TAKE 1 TABLET(25 MG) BY MOUTH DAILY 90 tablet 1    dicyclomine (BENTYL) 20 MG tablet Take 1 tablet (20 mg) by mouth 4 times daily as needed (abdominal pain) 20 tablet 0    diphenhydrAMINE (BENADRYL ALLERGY) 25 MG capsule Administer 30 min - 2 hours pre - IV contrast injection 2 capsule 0    docusate sodium (COLACE) 100 MG capsule Take 1 capsule (100 mg) by mouth 2 times daily 20 capsule 0    ketoconazole (NIZORAL) 2 % external cream Apply topically daily 30 g 1    mupirocin (BACTROBAN) 2 % external ointment Apply topically 3 times daily Please apply antibiotic ointment to each nostril, 3 times a day, starting today, and continue daily, including the morning of your surgery date. This is extremely important in order to prevent infection. 30 g 0     "ondansetron (ZOFRAN) 4 MG tablet Take 1 tablet (4 mg) by mouth every 6 hours as needed for nausea 16 tablet 0    oxyCODONE-acetaminophen (PERCOCET) 5-325 MG tablet Take 1 tablet by mouth every 6 hours as needed for pain 21 tablet 0       Allergies   Allergen Reactions    Blood-Group Specific Substance Unknown     Anti-D   present. Expect delays in blood for transfusion.  Draw 2 lavender  for type and screen orders.    Iodinated Contrast Media [Iodinated Contrast Media] Hives        Social History     Tobacco Use    Smoking status: Former     Types: Cigarettes     Quit date: 3/4/2006     Years since quittin.0    Smokeless tobacco: Never   Substance Use Topics    Alcohol use: Yes     Alcohol/week: 1.0 standard drink of alcohol     Comment: rare     {FAMILY HISTORY (Optional):519879376}  History   Drug Use No         Review of Systems  {ROS Picklists (Optional):137361}    Objective    /60   Pulse 67   Temp 97.8  F (36.6  C)   Resp 20   Ht 1.549 m (5' 1\")   Wt 61 kg (134 lb 6 oz)   LMP  (LMP Unknown)   SpO2 99%   BMI 25.39 kg/m     Estimated body mass index is 25.39 kg/m  as calculated from the following:    Height as of this encounter: 1.549 m (5' 1\").    Weight as of this encounter: 61 kg (134 lb 6 oz).  Physical Exam  {Exam List (Optional):440046}    Recent Labs   Lab Test 23  1608 23  0906   HGB 14.7 14.0    179    140   POTASSIUM 3.6 3.8   CR 0.78 0.81        Diagnostics  {LABS:537979}   {EK}    Revised Cardiac Risk Index (RCRI)  The patient has the following serious cardiovascular risks for perioperative complications:  {PREOP REVISED CARDIAC RISK INDEX (RCRI) :149633}     RCRI Interpretation: {REVISED CARDIAC RISK INTERPRETATION :460460}         Signed Electronically by: YANCY Souza CNP  Copy of this evaluation report is provided to requesting physician.    {Provider Resources  Preop [facility] Preop Guidelines  Revised Cardiac Risk " Index :447217}   {Email feedback regarding this note to [email or web address]   :746637}

## 2024-03-20 ENCOUNTER — ANESTHESIA (OUTPATIENT)
Dept: SURGERY | Facility: AMBULATORY SURGERY CENTER | Age: 44
End: 2024-03-20

## 2024-03-20 ENCOUNTER — HOSPITAL ENCOUNTER (OUTPATIENT)
Facility: AMBULATORY SURGERY CENTER | Age: 44
Discharge: HOME | End: 2024-03-20
Attending: PLASTIC SURGERY | Admitting: PLASTIC SURGERY

## 2024-03-20 VITALS
OXYGEN SATURATION: 97 % | HEIGHT: 61 IN | SYSTOLIC BLOOD PRESSURE: 114 MMHG | RESPIRATION RATE: 18 BRPM | TEMPERATURE: 97.3 F | WEIGHT: 134 LBS | DIASTOLIC BLOOD PRESSURE: 58 MMHG | HEART RATE: 65 BPM | BODY MASS INDEX: 25.3 KG/M2

## 2024-03-20 PROCEDURE — 360N000093 HC SURGERY LEVEL COSMETIC 270 MIN: Mod: DBP

## 2024-03-20 PROCEDURE — 370N000023: Mod: DBP

## 2024-03-20 PROCEDURE — 96999 UNLISTED SPEC DERM SVC/PX: CPT | Performed by: PLASTIC SURGERY

## 2024-03-20 PROCEDURE — L8600 IMPLANT BREAST SILICONE/EQ: HCPCS | Mod: DBP

## 2024-03-20 PROCEDURE — 88300 SURGICAL PATH GROSS: CPT | Mod: 26 | Performed by: STUDENT IN AN ORGANIZED HEALTH CARE EDUCATION/TRAINING PROGRAM

## 2024-03-20 PROCEDURE — 88300 SURGICAL PATH GROSS: CPT | Mod: TC,XS | Performed by: PLASTIC SURGERY

## 2024-03-20 PROCEDURE — 19316 MASTOPEXY: CPT | Performed by: NURSE ANESTHETIST, CERTIFIED REGISTERED

## 2024-03-20 DEVICE — IMPLANTABLE DEVICE: Type: IMPLANTABLE DEVICE | Site: BREAST | Status: FUNCTIONAL

## 2024-03-20 RX ORDER — HYDROMORPHONE HYDROCHLORIDE 1 MG/ML
0.4 INJECTION, SOLUTION INTRAMUSCULAR; INTRAVENOUS; SUBCUTANEOUS EVERY 5 MIN PRN
Status: DISCONTINUED | OUTPATIENT
Start: 2024-03-20 | End: 2024-03-20 | Stop reason: HOSPADM

## 2024-03-20 RX ORDER — ACETAMINOPHEN 325 MG/1
975 TABLET ORAL ONCE
Status: COMPLETED | OUTPATIENT
Start: 2024-03-20 | End: 2024-03-20

## 2024-03-20 RX ORDER — ONDANSETRON 2 MG/ML
4 INJECTION INTRAMUSCULAR; INTRAVENOUS EVERY 30 MIN PRN
Status: DISCONTINUED | OUTPATIENT
Start: 2024-03-20 | End: 2024-03-20 | Stop reason: HOSPADM

## 2024-03-20 RX ORDER — ONDANSETRON 2 MG/ML
INJECTION INTRAMUSCULAR; INTRAVENOUS PRN
Status: DISCONTINUED | OUTPATIENT
Start: 2024-03-20 | End: 2024-03-20

## 2024-03-20 RX ORDER — NALOXONE HYDROCHLORIDE 0.4 MG/ML
0.1 INJECTION, SOLUTION INTRAMUSCULAR; INTRAVENOUS; SUBCUTANEOUS
Status: DISCONTINUED | OUTPATIENT
Start: 2024-03-20 | End: 2024-03-20 | Stop reason: HOSPADM

## 2024-03-20 RX ORDER — FENTANYL CITRATE 50 UG/ML
50 INJECTION, SOLUTION INTRAMUSCULAR; INTRAVENOUS EVERY 5 MIN PRN
Status: DISCONTINUED | OUTPATIENT
Start: 2024-03-20 | End: 2024-03-20 | Stop reason: HOSPADM

## 2024-03-20 RX ORDER — ONDANSETRON 2 MG/ML
4 INJECTION INTRAMUSCULAR; INTRAVENOUS EVERY 30 MIN PRN
Status: DISCONTINUED | OUTPATIENT
Start: 2024-03-20 | End: 2024-03-21 | Stop reason: HOSPADM

## 2024-03-20 RX ORDER — LIDOCAINE 40 MG/G
CREAM TOPICAL
Status: DISCONTINUED | OUTPATIENT
Start: 2024-03-20 | End: 2024-03-20 | Stop reason: HOSPADM

## 2024-03-20 RX ORDER — FENTANYL CITRATE 50 UG/ML
INJECTION, SOLUTION INTRAMUSCULAR; INTRAVENOUS PRN
Status: DISCONTINUED | OUTPATIENT
Start: 2024-03-20 | End: 2024-03-20

## 2024-03-20 RX ORDER — ONDANSETRON 4 MG/1
4 TABLET, ORALLY DISINTEGRATING ORAL EVERY 30 MIN PRN
Status: DISCONTINUED | OUTPATIENT
Start: 2024-03-20 | End: 2024-03-21 | Stop reason: HOSPADM

## 2024-03-20 RX ORDER — PROPOFOL 10 MG/ML
INJECTION, EMULSION INTRAVENOUS CONTINUOUS PRN
Status: DISCONTINUED | OUTPATIENT
Start: 2024-03-20 | End: 2024-03-20

## 2024-03-20 RX ORDER — OXYCODONE HYDROCHLORIDE 5 MG/1
10 TABLET ORAL
Status: DISCONTINUED | OUTPATIENT
Start: 2024-03-20 | End: 2024-03-21 | Stop reason: HOSPADM

## 2024-03-20 RX ORDER — HYDROMORPHONE HYDROCHLORIDE 1 MG/ML
0.2 INJECTION, SOLUTION INTRAMUSCULAR; INTRAVENOUS; SUBCUTANEOUS EVERY 5 MIN PRN
Status: DISCONTINUED | OUTPATIENT
Start: 2024-03-20 | End: 2024-03-20 | Stop reason: HOSPADM

## 2024-03-20 RX ORDER — ONDANSETRON 4 MG/1
4 TABLET, ORALLY DISINTEGRATING ORAL EVERY 30 MIN PRN
Status: DISCONTINUED | OUTPATIENT
Start: 2024-03-20 | End: 2024-03-20 | Stop reason: HOSPADM

## 2024-03-20 RX ORDER — FENTANYL CITRATE 50 UG/ML
25 INJECTION, SOLUTION INTRAMUSCULAR; INTRAVENOUS EVERY 5 MIN PRN
Status: DISCONTINUED | OUTPATIENT
Start: 2024-03-20 | End: 2024-03-20 | Stop reason: HOSPADM

## 2024-03-20 RX ORDER — NALOXONE HYDROCHLORIDE 0.4 MG/ML
0.1 INJECTION, SOLUTION INTRAMUSCULAR; INTRAVENOUS; SUBCUTANEOUS
Status: DISCONTINUED | OUTPATIENT
Start: 2024-03-20 | End: 2024-03-21 | Stop reason: HOSPADM

## 2024-03-20 RX ORDER — SODIUM CHLORIDE, SODIUM LACTATE, POTASSIUM CHLORIDE, CALCIUM CHLORIDE 600; 310; 30; 20 MG/100ML; MG/100ML; MG/100ML; MG/100ML
INJECTION, SOLUTION INTRAVENOUS CONTINUOUS
Status: DISCONTINUED | OUTPATIENT
Start: 2024-03-20 | End: 2024-03-20 | Stop reason: HOSPADM

## 2024-03-20 RX ORDER — PROPOFOL 10 MG/ML
INJECTION, EMULSION INTRAVENOUS PRN
Status: DISCONTINUED | OUTPATIENT
Start: 2024-03-20 | End: 2024-03-20

## 2024-03-20 RX ORDER — DEXAMETHASONE SODIUM PHOSPHATE 4 MG/ML
INJECTION, SOLUTION INTRA-ARTICULAR; INTRALESIONAL; INTRAMUSCULAR; INTRAVENOUS; SOFT TISSUE PRN
Status: DISCONTINUED | OUTPATIENT
Start: 2024-03-20 | End: 2024-03-20

## 2024-03-20 RX ORDER — EPINEPHRINE 1 MG/ML(1)
AMPUL (ML) INJECTION PRN
Status: DISCONTINUED | OUTPATIENT
Start: 2024-03-20 | End: 2024-03-20 | Stop reason: HOSPADM

## 2024-03-20 RX ORDER — CEFAZOLIN SODIUM 2 G/50ML
2 SOLUTION INTRAVENOUS
Status: COMPLETED | OUTPATIENT
Start: 2024-03-20 | End: 2024-03-20

## 2024-03-20 RX ORDER — OXYCODONE HYDROCHLORIDE 5 MG/1
5 TABLET ORAL ONCE
Status: COMPLETED | OUTPATIENT
Start: 2024-03-20 | End: 2024-03-20

## 2024-03-20 RX ORDER — GLYCOPYRROLATE 0.2 MG/ML
INJECTION, SOLUTION INTRAMUSCULAR; INTRAVENOUS PRN
Status: DISCONTINUED | OUTPATIENT
Start: 2024-03-20 | End: 2024-03-20

## 2024-03-20 RX ORDER — LIDOCAINE HYDROCHLORIDE 20 MG/ML
INJECTION, SOLUTION INFILTRATION; PERINEURAL PRN
Status: DISCONTINUED | OUTPATIENT
Start: 2024-03-20 | End: 2024-03-20

## 2024-03-20 RX ORDER — OXYCODONE HYDROCHLORIDE 5 MG/1
5 TABLET ORAL
Status: COMPLETED | OUTPATIENT
Start: 2024-03-20 | End: 2024-03-20

## 2024-03-20 RX ADMIN — Medication 100 MCG: at 13:21

## 2024-03-20 RX ADMIN — SODIUM CHLORIDE, SODIUM LACTATE, POTASSIUM CHLORIDE, CALCIUM CHLORIDE: 600; 310; 30; 20 INJECTION, SOLUTION INTRAVENOUS at 06:39

## 2024-03-20 RX ADMIN — FENTANYL CITRATE 25 MCG: 50 INJECTION, SOLUTION INTRAMUSCULAR; INTRAVENOUS at 15:41

## 2024-03-20 RX ADMIN — Medication 100 MCG: at 12:57

## 2024-03-20 RX ADMIN — PROPOFOL 150 MCG/KG/MIN: 10 INJECTION, EMULSION INTRAVENOUS at 12:19

## 2024-03-20 RX ADMIN — PROPOFOL 150 MCG/KG/MIN: 10 INJECTION, EMULSION INTRAVENOUS at 09:30

## 2024-03-20 RX ADMIN — PROPOFOL 100 MCG/KG/MIN: 10 INJECTION, EMULSION INTRAVENOUS at 13:13

## 2024-03-20 RX ADMIN — PROPOFOL 150 MG: 10 INJECTION, EMULSION INTRAVENOUS at 07:35

## 2024-03-20 RX ADMIN — FENTANYL CITRATE 25 MCG: 50 INJECTION, SOLUTION INTRAMUSCULAR; INTRAVENOUS at 15:37

## 2024-03-20 RX ADMIN — SODIUM CHLORIDE, SODIUM LACTATE, POTASSIUM CHLORIDE, CALCIUM CHLORIDE: 600; 310; 30; 20 INJECTION, SOLUTION INTRAVENOUS at 13:15

## 2024-03-20 RX ADMIN — FENTANYL CITRATE 25 MCG: 50 INJECTION, SOLUTION INTRAMUSCULAR; INTRAVENOUS at 15:22

## 2024-03-20 RX ADMIN — ONDANSETRON 4 MG: 2 INJECTION INTRAMUSCULAR; INTRAVENOUS at 13:21

## 2024-03-20 RX ADMIN — Medication 100 MCG: at 07:53

## 2024-03-20 RX ADMIN — OXYCODONE HYDROCHLORIDE 5 MG: 5 TABLET ORAL at 15:30

## 2024-03-20 RX ADMIN — DEXAMETHASONE SODIUM PHOSPHATE 4 MG: 4 INJECTION, SOLUTION INTRA-ARTICULAR; INTRALESIONAL; INTRAMUSCULAR; INTRAVENOUS; SOFT TISSUE at 07:38

## 2024-03-20 RX ADMIN — FENTANYL CITRATE 50 MCG: 50 INJECTION, SOLUTION INTRAMUSCULAR; INTRAVENOUS at 07:40

## 2024-03-20 RX ADMIN — PROPOFOL 150 MCG/KG/MIN: 10 INJECTION, EMULSION INTRAVENOUS at 11:26

## 2024-03-20 RX ADMIN — Medication 100 MCG: at 08:20

## 2024-03-20 RX ADMIN — FENTANYL CITRATE 25 MCG: 50 INJECTION, SOLUTION INTRAMUSCULAR; INTRAVENOUS at 15:46

## 2024-03-20 RX ADMIN — PROPOFOL 50 MG: 10 INJECTION, EMULSION INTRAVENOUS at 08:35

## 2024-03-20 RX ADMIN — Medication 0.5 MG: at 13:35

## 2024-03-20 RX ADMIN — GLYCOPYRROLATE 0.2 MG: 0.2 INJECTION, SOLUTION INTRAMUSCULAR; INTRAVENOUS at 07:43

## 2024-03-20 RX ADMIN — PROPOFOL 150 MCG/KG/MIN: 10 INJECTION, EMULSION INTRAVENOUS at 08:30

## 2024-03-20 RX ADMIN — PROPOFOL 200 MCG/KG/MIN: 10 INJECTION, EMULSION INTRAVENOUS at 07:37

## 2024-03-20 RX ADMIN — ACETAMINOPHEN 975 MG: 325 TABLET ORAL at 06:36

## 2024-03-20 RX ADMIN — Medication 0.5 MG: at 09:08

## 2024-03-20 RX ADMIN — LIDOCAINE HYDROCHLORIDE 100 MG: 20 INJECTION, SOLUTION INFILTRATION; PERINEURAL at 07:35

## 2024-03-20 RX ADMIN — PROPOFOL 150 MCG/KG/MIN: 10 INJECTION, EMULSION INTRAVENOUS at 10:25

## 2024-03-20 RX ADMIN — CEFAZOLIN SODIUM 2 G: 2 SOLUTION INTRAVENOUS at 11:39

## 2024-03-20 RX ADMIN — OXYCODONE HYDROCHLORIDE 5 MG: 5 TABLET ORAL at 16:33

## 2024-03-20 RX ADMIN — CEFAZOLIN SODIUM 2 G: 2 SOLUTION INTRAVENOUS at 07:39

## 2024-03-20 RX ADMIN — FENTANYL CITRATE 50 MCG: 50 INJECTION, SOLUTION INTRAMUSCULAR; INTRAVENOUS at 08:32

## 2024-03-20 NOTE — DISCHARGE INSTRUCTIONS
[facility]  Home Care Following Anesthesia  For 24 hours after surgery:  Get plenty of rest.  A responsible adult must stay with you for at least 24 hours after you leave the surgery center.  Do not drive or use heavy equipment.  If you have weakness or tingling, don't drive or use heavy equipment until this feeling goes away.   Do not drink alcohol.   Avoid strenuous or risky activities.  Ask for help when climbing stairs.  You may feel lightheaded.  IF so, sit for a few minutes before standing.  Have someone help you get up.   If you have nausea (feel sick to your stomach): Drink only clear liquids such as apple juice, ginger ale, broth or 7-Up.  Rest may also help.  Be sure to drink enough fluids.  Move to a regular diet as you feel able.   You may have a slight fever.  Call the doctor if your fever is over 100 F (37.7 C) (taken under the tongue) or lasts longer than 24 hours.  You may have a dry mouth, a sore throat, muscle aches or trouble sleeping. These should go away after 24 hours.  Do not make important or legal decisions.   It is recommended to avoid smoking.               Tips for taking pain medications  To get the best pain relief possible, remember these points:  Take pain medications as directed, before pain becomes severe.  Pain medication can upset your stomach: taking it with food may help.  Constipation is a common side effect of pain medication. Drink plenty of  fluids.  Eat foods high in fiber. Take a stool softener if recommended by your doctor or pharmacist.  Do not drink alcohol, drive or operate machinery while taking pain medications.  Ask about other ways to control pain, such as with heat, ice or relaxation.    Tylenol/Acetaminophen Consumption    If you feel your pain relief is insufficient, you may take Tylenol/Acetaminophen in addition to your narcotic pain medication.   Be careful not to exceed 4,000 mg of Tylenol/Acetaminophen in a 24 hour  period from all sources.  If you are taking extra strength Tylenol/acetaminophen (500 mg), the maximum dose is 8 tablets in 24 hours.  If you are taking regular strength acetaminophen (325 mg), the maximum dose is 12 tablets in 24 hours.    Call a doctor for any of the following:  Signs of infection (fever, growing tenderness at the surgery site, a large amount of drainage or bleeding, severe pain, foul-smelling drainage, redness, swelling).  It has been over 8 to 10 hours since surgery and you are still not able to urinate (pass water).  Headache for over 24 hours.  Numbness, tingling or weakness the day after surgery (if you had spinal anesthesia).  Signs of Covid-19 infection (temperature over 100 degrees, shortness of breath, cough, loss of taste/smell, generalized body aches, persistent headache, chills, sore throat, nausea/vomiting/diarrhea)  Your doctor is:       Dr. Pankaj Mccrary, Plastic Surgery: [phone]               Or dial [phone] and ask for the resident on call for:  Plastics  For emergency care, call the:  [facility]:  [phone] (TTY for hearing impaired: [phone])

## 2024-03-20 NOTE — ANESTHESIA PROCEDURE NOTES
Airway       Patient location during procedure: OR  Staff -        CRNA: Leanna Springer APRN CRNA       Performed By: CRNA  Consent for Airway        Urgency: elective  Indications and Patient Condition       Indications for airway management: johanny-procedural       Induction type:intravenous       Mask difficulty assessment: 0 - not attempted    Final Airway Details       Final airway type: supraglottic airway    Supraglottic Airway Details        Type: LMA       Brand: I-Gel       LMA size: 4    Post intubation assessment        Placement verified by: capnometry and chest rise        Number of attempts at approach: 1       Number of other approaches attempted: 0       Secured with: tape       Ease of procedure: easy       Dentition: Intact and Unchanged

## 2024-03-20 NOTE — ANESTHESIA CARE TRANSFER NOTE
Patient: Orquidea Mckeon    Procedure: Procedure(s):  REMOVAL, IMPLANT, BREAST BILATERAL  MASTOPEXY, BILATERAL, COSMETIC  MAMMOPLASTY, AUGMENTATION, COSMETIC BILATERAL       Diagnosis: History of breast augmentation [Z98.82]  Rupture of implant of left breast, subsequent encounter [T85.43XD]  Diagnosis Additional Information: No value filed.    Anesthesia Type:   General     Note:    Oropharynx: oral airway in place and spontaneously breathing  Level of Consciousness: drowsy  Oxygen Supplementation: face mask  Level of Supplemental Oxygen (L/min / FiO2): 6  Independent Airway: airway patency satisfactory and stable  Dentition: dentition unchanged  Vital Signs Stable: post-procedure vital signs reviewed and stable  Report to RN Given: handoff report given  Patient transferred to: PACU    Handoff Report: Identifed the Patient, Identified the Reponsible Provider, Reviewed the pertinent medical history, Discussed the surgical course, Reviewed Intra-OP anesthesia mangement and issues during anesthesia, Set expectations for post-procedure period and Allowed opportunity for questions and acknowledgement of understanding    Vitals:  Vitals Value Taken Time   BP 91/55 03/20/24 1445   Temp 36.9  C (98.5  F) 03/20/24 1443   Pulse 64 03/20/24 1445   Resp 11 03/20/24 1445   SpO2 99 % 03/20/24 1445   Vitals shown include unfiled device data.    Electronically Signed By: YANCY Bell CRNA  March 20, 2024  2:46 PM

## 2024-03-20 NOTE — ANESTHESIA POSTPROCEDURE EVALUATION
Patient: Orquidea Mckeon    Procedure: Procedure(s):  REMOVAL, IMPLANT, BREAST BILATERAL  MASTOPEXY, BILATERAL, COSMETIC  MAMMOPLASTY, AUGMENTATION, COSMETIC BILATERAL       Anesthesia Type:  General    Note:  Disposition: Outpatient   Postop Pain Control: Uneventful            Sign Out: Well controlled pain   PONV: No   Neuro/Psych: Uneventful            Sign Out: Acceptable/Baseline neuro status   Airway/Respiratory: Uneventful            Sign Out: Acceptable/Baseline resp. status   CV/Hemodynamics: Uneventful            Sign Out: Acceptable CV status; No obvious hypovolemia; No obvious fluid overload   Other NRE: NONE   DID A NON-ROUTINE EVENT OCCUR? No           Last vitals:  Vitals Value Taken Time   BP 91/55 03/20/24 1445   Temp 36.9  C (98.5  F) 03/20/24 1443   Pulse 56 03/20/24 1454   Resp 10 03/20/24 1454   SpO2 99 % 03/20/24 1454   Vitals shown include unfiled device data.    Electronically Signed By: David Fuentes MD  March 20, 2024  2:56 PM

## 2024-03-20 NOTE — ANESTHESIA PREPROCEDURE EVALUATION
Anesthesia Pre-Procedure Evaluation    Patient: Orquidea Mckeon   MRN: [id number] : 1980        Procedure : Procedure(s):  REMOVAL, IMPLANT, BREAST BILATERAL  MASTOPEXY, BILATERAL, COSMETIC  MAMMOPLASTY, AUGMENTATION, COSMETIC BILATERAL          Past Medical History:   Diagnosis Date    Hemangioma     location: liver      Past Surgical History:   Procedure Laterality Date    APPENDECTOMY      BREAST SURGERY      LAPAROSCOPIC ASSISTED HYSTERECTOMY VAGINAL N/A 2020    Procedure: LAPAROSCOPIC ASSISTED VAGINAL HYSTERECTOMY BILATERAL SALPINGECTOMY CYSTOSCOPY;  Surgeon: Chelsea Mitchell MD;  Location: [facility];  Service: Gynecology    [facility] LIGATE FALLOPIAN TUBE      Description: Tubal Ligation;  Recorded: 2013;  Comments: for sterilization on       Allergies   Allergen Reactions    Blood-Group Specific Substance Unknown     Anti-D   present. Expect delays in blood for transfusion.  Draw 2 lavender  for type and screen orders.    Iodinated Contrast Media [Iodinated Contrast Media] Hives      Social History     Tobacco Use    Smoking status: Former     Types: Cigarettes     Quit date: 3/4/2006     Years since quittin.0    Smokeless tobacco: Never   Substance Use Topics    Alcohol use: Yes     Alcohol/week: 1.0 standard drink of alcohol     Comment: rare      Wt Readings from Last 1 Encounters:   24 60.8 kg (134 lb)           Physical Exam    Airway        Mallampati: I   TM distance: > 3 FB   Neck ROM: full   Mouth opening: > 3 cm    Respiratory Devices and Support         Dental       (+) Minor Abnormalities - some fillings, tiny chips      Cardiovascular   cardiovascular exam normal          Pulmonary   pulmonary exam normal                OUTSIDE LABS:  CBC:   Lab Results   Component Value Date    WBC 8.0 2023    WBC 6.2 2023    HGB 13.8 2024    HGB 14.7 2023    HCT 42.2 2023    HCT 39.5 2023     2023     2023  "    BMP:   Lab Results   Component Value Date     05/08/2023     05/01/2023    POTASSIUM 3.6 05/08/2023    POTASSIUM 3.8 05/01/2023    CHLORIDE 108 (H) 05/08/2023    CHLORIDE 106 05/01/2023    CO2 24 05/08/2023    CO2 24 05/01/2023    BUN 10 05/08/2023    BUN 7 (L) 05/01/2023    CR 0.78 05/08/2023    CR 0.81 05/01/2023    GLC 99 05/08/2023    GLC 95 05/01/2023     COAGS: No results found for: \"PTT\", \"INR\", \"FIBR\"  POC:   Lab Results   Component Value Date    HCG Negative 03/18/2024     HEPATIC:   Lab Results   Component Value Date    ALBUMIN 4.2 05/08/2023    PROTTOTAL 6.7 05/08/2023    ALT 17 05/08/2023    AST 17 05/08/2023    ALKPHOS 43 (L) 05/08/2023    BILITOTAL 0.6 05/08/2023     OTHER:   Lab Results   Component Value Date    JOELLE 9.1 05/08/2023    MAG 1.9 05/01/2023    LIPASE 37 05/08/2023    TSH 2.10 12/19/2022    CRP 0.2 05/08/2023       Anesthesia Plan    ASA Status:  2    NPO Status:  NPO Appropriate    Anesthesia Type: General.     - Airway: ETT   Induction: Intravenous.   Maintenance: Balanced.        Consents    Anesthesia Plan(s) and associated risks, benefits, and realistic alternatives discussed. Questions answered and patient/representative(s) expressed understanding.     - Discussed: Risks, Benefits and Alternatives for BOTH SEDATION and the PROCEDURE were discussed     - Discussed with:  Patient      - Extended Intubation/Ventilatory Support Discussed: No.      - Patient is DNR/DNI Status: No     Use of blood products discussed: No .     Postoperative Care    Pain management: Multi-modal analgesia.   PONV prophylaxis: Ondansetron (or other 5HT-3), Dexamethasone or Solumedrol, Background Propofol Infusion     Comments:               Delano Bronson MD    I have reviewed the pertinent notes and labs in the chart from the past 30 days and (re)examined the patient.  Any updates or changes from those notes are reflected in this note.              # Overweight: Estimated body mass index is " "25.32 kg/m  as calculated from the following:    Height as of this encounter: 1.549 m (5' 1\").    Weight as of this encounter: 60.8 kg (134 lb).      "

## 2024-03-21 NOTE — BRIEF OP NOTE
[facility] [place]    Brief Operative Note    Pre-operative diagnosis: History of breast augmentation [Z98.82]  Rupture of implant of left breast, subsequent encounter [T85.43XD]  Post-operative diagnosis Same as pre-operative diagnosis    Procedure: REMOVAL, IMPLANT, BREAST BILATERAL, Bilateral - Breast  MASTOPEXY, BILATERAL, COSMETIC, Bilateral - Breast  MAMMOPLASTY, AUGMENTATION, COSMETIC BILATERAL, Bilateral - Breast    Surgeon: Surgeon(s) and Role:     * Pankaj Mccrary MD - Primary  Anesthesia: General   Estimated Blood Loss: 20 mL from 3/20/2024  7:29 AM to 3/20/2024  2:41 PM      Drains: None  Specimens:   ID Type Source Tests Collected by Time Destination   1 : Left breast  implant; For disposal only Implant Breast, Left SURGICAL PATHOLOGY EXAM Pankaj Mccrary MD 3/20/2024  9:15 AM    2 : Right breast implant; For disposal only Implant Breast, Right SURGICAL PATHOLOGY EXAM Pankaj Mccrary MD 3/20/2024  9:15 AM      Findings:   Bilateral grade 2 ptosis.  Left breast implant, completely deflated .  Complications: None.  Implants:   Implant Name Type Inv. Item Serial No.  Lot No. LRB No. Used Action   IMP BREAST NATRELLE MOD PROFILE SMOOTH COHESIVE  SCM-445 - [id number] Breast Implant/Tissue Expander IMP BREAST NATRELLE MOD PROFILE SMOOTH COHESIVE  SCM-445 [id number] [employer]  Right 1 Implanted   IMP BREAST NATRELLE MOD PROFILE SMOOTH COHESIVE  SCM-445 - [id number] Breast Implant/Tissue Expander IMP BREAST NATRELLE MOD PROFILE SMOOTH COHESIVE  SCM-445 [id number] [employer]  Left 1 Implanted     Pankaj Mccrary MD , FACS   Diplomate American Board of Plastic Surgery  Diplomate American Board of Surgery  Adj. Assistant Professor of Surgery  Division of Plastic & Reconstructive Surgery   [facility]  Office: [phone]   3/20/2024 at 9:03 PM

## 2024-03-22 ENCOUNTER — OFFICE VISIT (OUTPATIENT)
Dept: PLASTIC SURGERY | Facility: AMBULATORY SURGERY CENTER | Age: 44
End: 2024-03-22
Payer: COMMERCIAL

## 2024-03-22 DIAGNOSIS — Z98.82 HISTORY OF BREAST AUGMENTATION: Primary | ICD-10-CM

## 2024-03-22 PROCEDURE — 99024 POSTOP FOLLOW-UP VISIT: CPT | Performed by: PLASTIC SURGERY

## 2024-03-22 NOTE — PROGRESS NOTES
Orquidea is a 43 years old patient status post bilateral breast implant explantation, bilateral augmentation mastopexy with inferior capsulorrhaphy.  She is 2 days out from surgery.  Patient is doing well.    At the physical exam, incisions are healing well.  No evidence of wound dehiscence.  Bilateral NAC are viable with capillary refill less than 2 seconds.  No evidence of hematoma or surgical site infection.  Good shape and symmetry bilaterally.    Plan: May have regular showers, start applying antibiotic ointment along the incisions, continue with sports bra and follow-up with me in 1 week.  Patient is doing well from plastic surgery standpoint.    Pankaj Mccrary MD , FACS   Diplomate American Board of Plastic Surgery  Diplomate American Board of Surgery  Adj. Assistant Professor of Surgery  Division of Plastic & Reconstructive Surgery   [facility]  Office: [phone]   3/22/2024 at 11:10 AM

## 2024-03-22 NOTE — NURSING NOTE
FMLA / EBEN or Short Term Disability Paperwork completed and signed on providers behalf.     Leave start date: 03/20/2024    Leave end date: 04/08/2024    RTW on 04/08/2024 with light duty restrictions through 05/01/2024     Forms faxed to: [email or web address], at: [phone].    Forms labeled and sent to HIM for scanning.    Fabi Posadas RN on 3/22/2024 at 10:57 AM

## 2024-03-22 NOTE — LETTER
3/22/2024         RE: Orquidea Mckeon  [address] [facility] [address]        Dear Colleague,    Thank you for referring your patient, Orquidea Mckeon, to the [facility] [place]. Please see a copy of my visit note below.    Orquidea is a 43 years old patient status post bilateral breast implant explantation, bilateral augmentation mastopexy with inferior capsulorrhaphy.  She is 2 days out from surgery.  Patient is doing well.    At the physical exam, incisions are healing well.  No evidence of wound dehiscence.  Bilateral NAC are viable with capillary refill less than 2 seconds.  No evidence of hematoma or surgical site infection.  Good shape and symmetry bilaterally.    Plan: May have regular showers, start applying antibiotic ointment along the incisions, continue with sports bra and follow-up with me in 1 week.  Patient is doing well from plastic surgery standpoint.    Pankaj Mccrary MD , FACS   Diplomate American Board of Plastic Surgery  Diplomate American Board of Surgery  Adj. Assistant Professor of Surgery  Division of Plastic & Reconstructive Surgery   [facility]  Office: [phone]   3/22/2024 at 11:10 AM       Again, thank you for allowing me to participate in the care of your patient.        Sincerely,        Pankaj Mccrary MD

## 2024-03-24 NOTE — OP NOTE
Orquidea Mckeon    March 24, 2024      Preoperative diagnosis:   1) History of bilateral subpectoral augmentation mammoplasty with saline implants.    2) Left breast deflated implant.    3) Bilateral grade 2 ptosis with loss of upper pole fullness.     Postoperative diagnosis: Same    Procedure:     1) Bilateral breast implant explantations    2) Bilateral breast capsulorrhaphy at the level of the inframammary fold pocket    3) Bilateral subpectoral breast implant exchange (bilateral breast augmentation):    For the right breast a 445 cc moderate profile breast implant was utilized. Allergan reference SCM- and SN [id number]     For the left breast at 445 cc moderate profile breast implant was also utilized.  Allergan reference SCM- and SN [id number].    4) Bilateral mastopexies,  with a Melgoza pattern, superior medial pedicle technique.    Surgeon: Pankaj Mccrary MD.    Assistant: Padmini Tomas CSA (there was no plastic surgery resident available to assist).    Anesthesia: General.    IV fluid: 1600 mL     EBL: 20 ml.    U/O: 800 ml.    Findings: Bilateral subpectoral saline implants.  On the right breast there was an intact 480 mL saline implant.  On the left breast there was a 460 mL deflated left breast implant.  There was no evidence of tumor or fluid collection around the subpectoral breast implants.  Bilateral grade 2 ptosis.  Significant loss of upper pole fullness on the left breast.    Specimens: Bilateral subpectoral saline implants.    Drains: none    Disposition: Patient tolerated procedure well.  She was extubated and transferred to recovery room awake and in stable condition.     Indications:     This is a 43 year old lady who presents with a deflated left breast saline implant.  This has produced noticeable breast asymmetry as well as significant loss of upper pole fullness on the left breast.  Furthermore, she presents with bilateral grade 2 ptosis.  She has history of bilateral subpectoral breast  augmentation with saline implants, of unknown volume.  This was in 2005.    She noticed that her left breast implant became deflated on February 23, after she underwent a screening mammogram.  Since that time, patient noticed decreased volume on her left breast implant as well as pain.     Patient has been offered bilateral breast implant explantations, bilateral breast implant exchange for silicone breast implants (breast augmentation) as well as bilateral mastopexy with a Melgoza pattern, superior medial pedicle.    Risks were explained to the patient and they include but are not limited to: scarring, infection, potential explantation of one or both implants, bleeding, hematoma, seroma, temporary or permanent altered sensation on breast's skin and NAC, ischemia and potential necrosis of bilateral nipple areolar complexes potentially requiring tattooing, implant malposition, breast asymmetry, asymmetry in the location of the IMF, capsular contracture, rippling, animation deformity, the pneumothorax, chest tube placement, need for further surgeries.  Patient has acknowledged all these risks and has agreed to proceed signing informed consent.     Procedure:     Patient was identified in the preoperative holding area and preoperative IV antibiotics were given to her. I then proceeded to draw the Wise pattern bilaterally, for her bilateral mastopexies, utilizing the keyhole pattern.     First, I localized the acromioclavicular junction on each side of her shoulders as well as the sternal notch.  Then I measured the distance between each acromioclavicular junction to the sternal notch.  The center of this distance was marked and the breast meridian was drawn on the anterior surface of each breast.  Then I proceeded to localize  Pitanguy's point on the anterior surface of each breast and then marked 2 cm superior to this point. From this point I centered the keyhole pattern and draw the Wise pattern to be utilized in the  reduction, bilaterally.  The vertical limbs measured 7 centimeter in length.     From the inferior end of each of these 7 cm lines, I draw an angulated/vertical line both medially and laterally on each breast until they met the medial and the lateral ends of the inframammary fold (IMF).  The width of said angulated/vertical lines was determined by the pinch technique in order to guarantee the future tension-free closure of the vertical limb of the Melgoza pattern.     This was the drawing of the Melgoza pattern:                              Patient was then brought to the operating room and was placed supine in the operating room table. After general anesthesia was obtained the chest and both breasts were prepped and draped in the sterile surgical fashion.     I proceeded to first design and demarcate the superior medial pedicle on the right breast.  The base of this pedicle was 7 cm wide, encompassing the nipple areolar complex.     Utilizing a 38 mm cookie cutter placed on the center of the NAC, I proceeded to make hash marks, utilizing a #15 blade, on the impression left by the cookie-cutter on the NAC. Utilizing a #15 blade I proceeded to make hash marks along the Wise pattern markings and most importantly, on the superior medial pedicle.     I then proceeded to de-epithelialized the superior medial pedicle.  Then, this pedicle was defined and dissected down to the aponeurosis of the pectoralis major muscle underneath.  Once the pedicle was defined and dissected, I proceeded to excise the skin and part of the breast parenchyma on the inferior pole of the breast.  I also excised the skin and breast parenchyma on the area of the keyhole pattern where the nipple areolar complex and pedicle would be cranially rotated.    I was careful to left the capsule that was covering the breast implant underneath, especially at the level of the lower pole of the breast.     The skin and breast parenchyma from this right breast were  excised and discarded.    The above-mentioned steps were then performed on the left breast.    At this time, we made a transverse incision on the lower aspect of the capsule covering each breast implant.    Both implants were removed.  On the right breast, the saline implant was intact and the shell showed a capacity volume of 460 mL.  On the left breast, the saline implant was deflated and the shell showed a capacity volume of 480 mL.    Both implants were sent to pathology for gross examination only.    We inspected bilateral subpectoral cavities and there was no evidence of any tumor or abnormal fluid collection.  The capsules were Baker 1, bilaterally.    We copiously irrigated both cavities with antibiotic solution followed by chlorhexidine.  The hemostasis was excellent.    Based upon patient's breast diameter and chest frame and profile of the explanted breast implants, I brought moderate profile 445 mL sizers, that were introduced on each subpectoral cavity with a Almeida funnel.    I was satisfied that said volumes were given the patient and I spoke about fullness and they were filling the subpectoral cavity snugly.  Therefore I decided that these volumes we utilized for the permanent breast implants.    The sizers were left in place momentarily.     At this time, we directed our attention to the breasts and the superior medial pedicle containing the NAC was rotated superiorly and the NAC was inset on the previously developed keyhole with temporary skin staples.  The Melgoza pattern was closed along its vertical limb and inframammary fold with skin staples as well.      The above-mentioned steps were also performed on the left breast.     The patient was sat up and I found that she presented with excellent shape bilaterally.    However, I noticed that the implants will be pushed down inferiorly, and beyond the inframammary folds, bilaterally.  Therefore, I decided to perform bilateral inframammary fold  "capsulorrhaphies, in order to prevent implant malposition inferiorly and to ensure bilateral upper pole fullness.     Patient was then placed back in the horizontal position and both breasts were opened after removing all the staples.  Bilateral sizers were also removed.    Bilateral inframammary fold capsulorrhaphies were performed on each breast along the inframammary fold subpectoral pocket with 2-0 PDS interrupted stitches incorporating the periosteum of the underlying ribs.     Utilizing once again 2-0 PDS sutures, I proceeded to applied three central interrupted stitches on the opening of each breast's capsule that were originally covering the previously applied implants.    The ends of each stitch was secured with multiple hemostats.    With this technique, I ensured that no needles will be in contact when the permanent implants are placed.     At this time, I proceeded to irrigate again both cavities with antibiotic solution followed by chlorhexidine solution.  I also proceeded to repaint the chest with chlorhexidine and applied new sterile towels on the field.  Our scrub tech, my assistant and I proceeded to change to new gloves and utilizing a new Almeida funnel, the 445 mL moderate profile permanent breast implants which were bathed in antibiotic solution, where inserted in bilateral breast pockets with the \"no touch\" technique.     All the previously applied stitches were then tied down and bilateral breast capsules were closed.      Now that the permanent implants were secured in place in the subpectoral plane, we proceeded to continue with the mastopexy bilaterally.    The superior medial pedicle was rotated and the NAC was inset on the keyhole area and the Wise pattern was temporarily closed with skin staples.     The patient was sat up and I found that she presented with excellent shape bilaterally, and there was no further inferior breast implant malposition.    Patient was then placed back down.   "   Both breasts were then closed in layers.  The inframammary fold with 3-0 Monocryl buried interrupted stitches followed by 4-0 V-Loc running subcuticular stitches.  The vertical limb of the Melgoza pattern was also closed in layers.  3-0 Monocryl buried interrupted stitches followed by 4-0 Monocryl running subcuticular stitches. The areolas were closed with 3-0 Monocryl buried interrupted stitches followed by 4-0 V-Loc running subcuticular stitches.    At the end of the closure, bilateral nipple areolar complexes were viable with capillary refill less than 2 seconds.     Instrument count was reported by nursing personnel as correct.  Patient tolerated procedure well, she was extubated and transferred to recovery room awake in stable condition.    Pankaj Mccrary MD , FACS   Diplomate American Board of Plastic Surgery  Diplomate American Board of Surgery  Adj. Assistant Professor of Surgery  Division of Plastic & Reconstructive Surgery   [facility]  Office: [phone]   3/24/2024 at 4:26 PM

## 2024-03-26 LAB
PATH REPORT.COMMENTS IMP SPEC: NORMAL
PATH REPORT.COMMENTS IMP SPEC: NORMAL
PATH REPORT.FINAL DX SPEC: NORMAL
PATH REPORT.GROSS SPEC: NORMAL
PATH REPORT.MICROSCOPIC SPEC OTHER STN: NORMAL
PATH REPORT.RELEVANT HX SPEC: NORMAL
PHOTO IMAGE: NORMAL

## 2024-03-29 ENCOUNTER — OFFICE VISIT (OUTPATIENT)
Dept: PLASTIC SURGERY | Facility: AMBULATORY SURGERY CENTER | Age: 44
End: 2024-03-29
Payer: COMMERCIAL

## 2024-03-29 VITALS
BODY MASS INDEX: 24.92 KG/M2 | SYSTOLIC BLOOD PRESSURE: 114 MMHG | DIASTOLIC BLOOD PRESSURE: 68 MMHG | WEIGHT: 132 LBS | HEIGHT: 61 IN

## 2024-03-29 DIAGNOSIS — Z98.82 HISTORY OF BREAST AUGMENTATION: Primary | ICD-10-CM

## 2024-03-29 PROCEDURE — 99207 PR NO CHARGE LOS: CPT | Performed by: PLASTIC SURGERY

## 2024-03-29 NOTE — PROGRESS NOTES
Orquidea is a 43 years old lady status post bilateral breast implant explantation's, bilateral augmentation mastopexies with bilateral lower breast capsulorrhaphy.  She is 9 days out from surgery.  She is doing well.    At the physical exam, all incisions are healing well.  No sign of infection or wound dehiscence.  There is no evidence of hematoma or seroma.  Bilateral NAC's are viable.  No evidence of hematoma or seroma.    Plan: Continue with bacitracin ointment over the incisions, continue with sports bra, follow-up with me in 2 weeks.  Patient is doing well from plastic surgery standpoint.    Pankaj Mccrary MD , FACS   Diplomate American Board of Plastic Surgery  Diplomate American Board of Surgery  Adj. Assistant Professor of Surgery  Division of Plastic & Reconstructive Surgery   [facility]  Office: [phone]   3/29/2024 at 9:42 AM

## 2024-03-29 NOTE — LETTER
3/29/2024         RE: Orquidea Mckeon  [address] [facility] [address]        Dear Colleague,    Thank you for referring your patient, Orquidea Mckeon, to the [facility] PLASTIC SURGERY [facility]INIC [place]. Please see a copy of my visit note below.    Orquidea is a 43 years old lady status post bilateral breast implant explantation's, bilateral augmentation mastopexies with bilateral lower breast capsulorrhaphy.  She is 9 days out from surgery.  She is doing well.    At the physical exam, all incisions are healing well.  No sign of infection or wound dehiscence.  There is no evidence of hematoma or seroma.  Bilateral NAC's are viable.  No evidence of hematoma or seroma.    Plan: Continue with bacitracin ointment over the incisions, continue with sports bra, follow-up with me in 2 weeks.  Patient is doing well from plastic surgery standpoint.    Pankaj Mccrary MD , FACS   Diplomate American Board of Plastic Surgery  Diplomate American Board of Surgery  Adj. Assistant Professor of Surgery  Division of Plastic & Reconstructive Surgery   [facility]  Office: [phone]   3/29/2024 at 9:42 AM       Again, thank you for allowing me to participate in the care of your patient.        Sincerely,        Pankaj Mccrary MD

## 2024-04-11 ENCOUNTER — OFFICE VISIT (OUTPATIENT)
Dept: PLASTIC SURGERY | Facility: AMBULATORY SURGERY CENTER | Age: 44
End: 2024-04-11
Payer: COMMERCIAL

## 2024-04-11 DIAGNOSIS — Z98.890 HISTORY OF MASTOPEXY: Primary | ICD-10-CM

## 2024-04-11 PROCEDURE — 99024 POSTOP FOLLOW-UP VISIT: CPT | Performed by: PLASTIC SURGERY

## 2024-04-11 NOTE — PROGRESS NOTES
Orquidea is a 43 years old lady status post bilateral breast implant explantation, bilateral augmentation mastopexy with bilateral lower breast capsulorrhaphy.  She is 22 days out from surgery.  She is doing well    At the physical exam, incisions are healing well.  No sign of hypertrophic scarring or keloids.  There is no late hematoma or seroma.  Good shape and symmetry bilaterally.  Bilateral NAC's are viable.  There is still persistent edema on bilateral breast.    Plan: Continue with cocoa butter lotion, scar massage, continue with sports bra for another 3 weeks and follow-up with me in 4 weeks.  She is doing well from plastic surgery standpoint.    Pankaj Mccrary MD , FACS   Diplomate American Board of Plastic Surgery  Diplomate American Board of Surgery  Adj. Assistant Professor of Surgery  Division of Plastic & Reconstructive Surgery   [facility]  Office: [phone]   4/11/2024 at 3:08 PM

## 2024-04-11 NOTE — LETTER
4/11/2024         RE: Orquidea Mckeon  [address] [facility] [address]        Dear Colleague,    Thank you for referring your patient, Orquidea Mckeon, to the [facility] PLASTIC SURGERY CLINIC [place]. Please see a copy of my visit note below.    Orquidea is a 43 years old lady status post bilateral breast implant explantation, bilateral augmentation mastopexy with bilateral lower breast capsulorrhaphy.  She is 22 days out from surgery.  She is doing well    At the physical exam, incisions are healing well.  No sign of hypertrophic scarring or keloids.  There is no late hematoma or seroma.  Good shape and symmetry bilaterally.  Bilateral NAC's are viable.  There is still persistent edema on bilateral breast.    Plan: Continue with cocoa butter lotion, scar massage, continue with sports bra for another 3 weeks and follow-up with me in 4 weeks.  She is doing well from plastic surgery standpoint.    Pankaj Mccrary MD , FACS   Diplomate American Board of Plastic Surgery  Diplomate American Board of Surgery  Adj. Assistant Professor of Surgery  Division of Plastic & Reconstructive Surgery   [facility]  Office: [phone]   4/11/2024 at 3:08 PM       Again, thank you for allowing me to participate in the care of your patient.        Sincerely,        Pankaj Mccrary MD

## 2024-05-10 ENCOUNTER — OFFICE VISIT (OUTPATIENT)
Dept: PLASTIC SURGERY | Facility: AMBULATORY SURGERY CENTER | Age: 44
End: 2024-05-10
Payer: COMMERCIAL

## 2024-05-10 DIAGNOSIS — Z98.82 HISTORY OF BREAST AUGMENTATION: Primary | ICD-10-CM

## 2024-05-10 PROCEDURE — 99024 POSTOP FOLLOW-UP VISIT: CPT | Performed by: PLASTIC SURGERY

## 2024-05-10 NOTE — LETTER
5/10/2024         RE: Orquidea Mckeon  [address] [facility] [address]        Dear Colleague,    Thank you for referring your patient, Orquidea Mckeon, to the [facility] PLASTIC SURGERY CLINIC [place]. Please see a copy of my visit note below.    Orquidea is a 43 years old lady status post bilateral breast implant explantation, bilateral augmentation mastopexy with bilateral lower breast capsulorrhaphy.  She is 7 weeks out from surgery.  She is doing well.    At the physical exam, all incisions are well-healed.  No sign of hypertrophic scarring or keloid.  Good shape and symmetry bilaterally.  Gonzales 1 capsule bilaterally (within normal limits).                        Plan: Patient may resume all normal activities, continue with cocoa butter lotion and scar massage, may transition to regular bra and follow-up with me in 3 months.  Patient is doing well from plastic surgery standpoint.    Pankaj Mccrary MD , FACS   Diplomate American Board of Plastic Surgery  Diplomate American Board of Surgery  Adj. Assistant Professor of Surgery  Division of Plastic & Reconstructive Surgery   [facility]  Office: [phone]   5/10/2024 at 2:30 PM       Again, thank you for allowing me to participate in the care of your patient.        Sincerely,        Pankaj Mccrary MD

## 2024-05-10 NOTE — PROGRESS NOTES
Orquidea is a 43 years old lady status post bilateral breast implant explantation, bilateral augmentation mastopexy with bilateral lower breast capsulorrhaphy.  She is 7 weeks out from surgery.  She is doing well.    At the physical exam, all incisions are well-healed.  No sign of hypertrophic scarring or keloid.  Good shape and symmetry bilaterally.  Gonzales 1 capsule bilaterally (within normal limits).                        Plan: Patient may resume all normal activities, continue with cocoa butter lotion and scar massage, may transition to regular bra and follow-up with me in 3 months.  Patient is doing well from plastic surgery standpoint.    Pankaj Mccrary MD , FACS   Diplomate American Board of Plastic Surgery  Diplomate American Board of Surgery  Adj. Assistant Professor of Surgery  Division of Plastic & Reconstructive Surgery   [facility]  Office: [phone]   5/10/2024 at 2:30 PM

## 2024-05-31 ENCOUNTER — TELEPHONE (OUTPATIENT)
Dept: PLASTIC SURGERY | Facility: CLINIC | Age: 44
End: 2024-05-31
Payer: COMMERCIAL

## 2024-05-31 NOTE — TELEPHONE ENCOUNTER
Call placed to patient to discuss need for moving August appointment with Dr. Mccrary to July. Message left with instructions to call back.     Fabi Posadas RN on 5/31/2024 at 8:45 AM

## 2024-07-12 ENCOUNTER — OFFICE VISIT (OUTPATIENT)
Dept: PLASTIC SURGERY | Facility: AMBULATORY SURGERY CENTER | Age: 44
End: 2024-07-12
Payer: COMMERCIAL

## 2024-07-12 DIAGNOSIS — Z98.82 HISTORY OF BREAST AUGMENTATION: Primary | ICD-10-CM

## 2024-07-12 PROCEDURE — 99207 PR NO CHARGE LOS: CPT | Performed by: PLASTIC SURGERY

## 2024-07-12 NOTE — PROGRESS NOTES
Orquidea is a 43 years old lady status post bilateral breast implant explantation, bilateral augmentation mastopexy with bilateral lower breast capsulorrhaphy.  She is almost 4 months out.  She is doing well.     At the physical exam, all incisions are well-healed.  No sign of hypertrophic scarring or keloid.  Good shape and symmetry bilaterally.  Gonzales 1 capsule, within normal limits, bilaterally.                            Plan: Patient is doing well from plastic surgery standpoint.  I am happy with her results.  Patient is aware that I am leaving.  I will refer her to one of my partners for a regular checkup in 6 months.  Patient is agreement with this plan.  I do not anticipate any revisions.    Pankaj Mccrary MD , FACS   Diplomate American Board of Plastic Surgery  Diplomate American Board of Surgery  Adj. Assistant Professor of Surgery  Division of Plastic & Reconstructive Surgery   [facility]  Office: [phone]   7/14/2024 at 1:38 PM

## 2024-07-12 NOTE — LETTER
7/12/2024      Orquidea Mckeon  [address] [address]      Dear Colleague,    Thank you for referring your patient, Orquidea Mckeon, to the [facility] PLASTIC SURGERY CLINIC [place]. Please see a copy of my visit note below.    Orquidea is a 43 years old lady status post bilateral breast implant explantation, bilateral augmentation mastopexy with bilateral lower breast capsulorrhaphy.  She is almost 4 months out.  She is doing well.     At the physical exam, all incisions are well-healed.  No sign of hypertrophic scarring or keloid.  Good shape and symmetry bilaterally.  Gonzales 1 capsule, within normal limits, bilaterally.                            Plan: Patient is doing well from plastic surgery standpoint.  I am happy with her results.  Patient is aware that I am leaving.  I will refer her to one of my partners for a regular checkup in 6 months.  Patient is agreement with this plan.  I do not anticipate any revisions.    Pankaj Mccrary MD , FACS   Diplomate American Board of Plastic Surgery  Diplomate American Board of Surgery  Adj. Assistant Professor of Surgery  Division of Plastic & Reconstructive Surgery   [facility]  Office: [phone]   7/14/2024 at 1:38 PM       Again, thank you for allowing me to participate in the care of your patient.        Sincerely,        Pankaj Mccrary MD

## 2024-07-18 ENCOUNTER — TELEPHONE (OUTPATIENT)
Dept: PLASTIC SURGERY | Facility: CLINIC | Age: 44
End: 2024-07-18
Payer: COMMERCIAL

## 2024-07-18 NOTE — TELEPHONE ENCOUNTER
Patient confirmed scheduled appointment:  Date: 1/22/25  Time: 10:30 am  Visit type: New Plastic Surgery  Provider: Dr. Coulter  Location: [facility]  Testing/imaging: n/a  Additional notes: per message from Maria De Jesus RASHID RN/Dr. Mccrary

## 2024-08-09 DIAGNOSIS — R45.4 IRRITABILITY: ICD-10-CM

## 2024-08-09 RX ORDER — DESVENLAFAXINE 25 MG/1
25 TABLET, EXTENDED RELEASE ORAL DAILY
Qty: 90 TABLET | Refills: 3 | Status: SHIPPED | OUTPATIENT
Start: 2024-08-09

## 2024-09-03 ENCOUNTER — E-VISIT (OUTPATIENT)
Dept: URGENT CARE | Facility: CLINIC | Age: 44
End: 2024-09-03
Payer: COMMERCIAL

## 2024-09-03 ENCOUNTER — LAB (OUTPATIENT)
Dept: LAB | Facility: CLINIC | Age: 44
End: 2024-09-03
Attending: PREVENTIVE MEDICINE
Payer: COMMERCIAL

## 2024-09-03 DIAGNOSIS — J02.9 SORE THROAT: ICD-10-CM

## 2024-09-03 DIAGNOSIS — J02.9 SORE THROAT: Primary | ICD-10-CM

## 2024-09-03 LAB
DEPRECATED S PYO AG THROAT QL EIA: NEGATIVE
GROUP A STREP BY PCR: NOT DETECTED

## 2024-09-03 PROCEDURE — 99421 OL DIG E/M SVC 5-10 MIN: CPT | Performed by: PREVENTIVE MEDICINE

## 2024-09-03 PROCEDURE — 87651 STREP A DNA AMP PROBE: CPT

## 2024-09-03 PROCEDURE — 87635 SARS-COV-2 COVID-19 AMP PRB: CPT

## 2024-09-03 NOTE — PATIENT INSTRUCTIONS
Dear Orquidea,    After reviewing your responses, I would like you to come in for a swab to make sure we treat you correctly. This swab is to evaluate you for possible COVID, and strep and should be scheduled for today or tomorrow. Please use the Schedule Now button in [facility]ictor[facility] to schedule your swab. Otherwise, click this link to schedule a lab only appointment.    Lab appointments are not available at most locations on the weekends. If no Lab Only appointment is available, you should be seen in any of our convenient Urgent Care Centers for an in person visit, which can be found on our website here.    You will receive instructions with your results in [facility] once they are available.     If your symptoms worsen, you develop difficulty breathing, difficulty with drinking enough to stay hydrated, difficulty swallowing your saliva or have fevers for more than 5 days, please contact your primary care provider for an appointment or visit an Urgent Care Center to be seen.      Thanks again for choosing us as your health care partner.   Linus Schultz MD, MD  Sore Throat: Care Instructions  Overview     Infection by bacteria or a virus causes most sore throats. Cigarette smoke, dry air, air pollution, allergies, and yelling can also cause a sore throat. Sore throats can be painful and annoying. Fortunately, most sore throats go away on their own. If you have a bacterial infection, your doctor may prescribe antibiotics.  Follow-up care is a key part of your treatment and safety. Be sure to make and go to all appointments, and call your doctor if you are having problems. It's also a good idea to know your test results and keep a list of the medicines you take.  How can you care for yourself at home?  If your doctor prescribed antibiotics, take them as directed. Do not stop taking them just because you feel better. You need to take the full course of antibiotics.  Gargle with warm salt water several times a  "day to help reduce swelling and relieve pain. Mix 1/2 teaspoon of salt in 1 cup of warm water.  Take an over-the-counter pain medicine, such as acetaminophen (Tylenol), ibuprofen (Advil, Motrin), or naproxen (Aleve). Read and follow all instructions on the label.  Be careful when taking over-the-counter cold or flu medicines and Tylenol at the same time. Many of these medicines have acetaminophen, which is Tylenol. Read the labels to make sure that you are not taking more than the recommended dose. Too much acetaminophen (Tylenol) can be harmful.  Drink plenty of fluids. Fluids may help soothe an irritated throat. Hot fluids, such as tea or soup, may help decrease throat pain.  Use over-the-counter throat lozenges to soothe pain. Regular cough drops or hard candy may also help. These should not be given to young children because of the risk of choking.  Do not smoke or allow others to smoke around you. If you need help quitting, talk to your doctor about stop-smoking programs and medicines. These can increase your chances of quitting for good.  Use a vaporizer or humidifier to add moisture to your bedroom. Follow the directions for cleaning the machine.  When should you call for help?   Call your doctor now or seek immediate medical care if:    You have trouble breathing.     Your sore throat gets much worse on one side.     You have new or worse trouble swallowing.     You have a new or higher fever.   Watch closely for changes in your health, and be sure to contact your doctor if you do not get better as expected.  Where can you learn more?  Go to https://www.healthAwarenessHub.net/patiented  Enter U420 in the search box to learn more about \"Sore Throat: Care Instructions.\"  Current as of: September 27, 2023               Content Version: 14.0    8934-6730 [employer].   Care instructions adapted under license by your healthcare professional. If you have questions about a medical condition or this " instruction, always ask your healthcare professional. [employer] disclaims any warranty or liability for your use of this information.

## 2024-09-04 LAB — SARS-COV-2 RNA RESP QL NAA+PROBE: NEGATIVE

## 2024-09-05 ENCOUNTER — MYC MEDICAL ADVICE (OUTPATIENT)
Dept: FAMILY MEDICINE | Facility: CLINIC | Age: 44
End: 2024-09-05
Payer: COMMERCIAL

## 2024-09-05 DIAGNOSIS — T17.1XXA FOREIGN BODY IN NOSE, INITIAL ENCOUNTER: Primary | ICD-10-CM

## 2024-09-06 ENCOUNTER — TRANSFERRED RECORDS (OUTPATIENT)
Dept: HEALTH INFORMATION MANAGEMENT | Facility: CLINIC | Age: 44
End: 2024-09-06
Payer: COMMERCIAL

## 2024-09-24 ENCOUNTER — TRANSFERRED RECORDS (OUTPATIENT)
Dept: HEALTH INFORMATION MANAGEMENT | Facility: CLINIC | Age: 44
End: 2024-09-24
Payer: COMMERCIAL

## 2024-10-01 ENCOUNTER — TRANSFERRED RECORDS (OUTPATIENT)
Dept: HEALTH INFORMATION MANAGEMENT | Facility: CLINIC | Age: 44
End: 2024-10-01
Payer: COMMERCIAL

## 2024-10-16 ENCOUNTER — TRANSFERRED RECORDS (OUTPATIENT)
Dept: HEALTH INFORMATION MANAGEMENT | Facility: CLINIC | Age: 44
End: 2024-10-16
Payer: COMMERCIAL

## 2024-10-24 NOTE — TELEPHONE ENCOUNTER
FUTURE VISIT INFORMATION      FUTURE VISIT INFORMATION:  Date: 1/6/25  Time: 1:10pm  Location: [facility] [place]  REFERRAL INFORMATION:  Referring provider:  Vianey Lux MD  [facility]   Reason for visit/diagnosis  Foreign body in nose, initial encounter [T17.1XXA]  REF BY Vianey Lux MD  RECS IN [facility]  CONF LOC  SCHED W/PT  PER EMERITA ON SECURE CHAT- Community or KENRICK ok     RECORDS REQUESTED FROM:       Clinic name Comments Records Status Imaging Status   [facility]  9/5/24- Vianey De Santiago MD Epic     Imaging  1/3/23- MR Brain  [facility]  Pacs

## 2024-11-29 ENCOUNTER — HOSPITAL ENCOUNTER (EMERGENCY)
Facility: HOSPITAL | Age: 44
Discharge: HOME | End: 2024-11-29
Attending: STUDENT IN AN ORGANIZED HEALTH CARE EDUCATION/TRAINING PROGRAM | Admitting: STUDENT IN AN ORGANIZED HEALTH CARE EDUCATION/TRAINING PROGRAM
Payer: COMMERCIAL

## 2024-11-29 ENCOUNTER — APPOINTMENT (OUTPATIENT)
Dept: ULTRASOUND IMAGING | Facility: HOSPITAL | Age: 44
End: 2024-11-29
Attending: EMERGENCY MEDICINE
Payer: COMMERCIAL

## 2024-11-29 ENCOUNTER — APPOINTMENT (OUTPATIENT)
Dept: CT IMAGING | Facility: HOSPITAL | Age: 44
End: 2024-11-29
Attending: STUDENT IN AN ORGANIZED HEALTH CARE EDUCATION/TRAINING PROGRAM
Payer: COMMERCIAL

## 2024-11-29 VITALS
OXYGEN SATURATION: 99 % | WEIGHT: 126.8 LBS | TEMPERATURE: 98.4 F | BODY MASS INDEX: 23.96 KG/M2 | HEART RATE: 69 BPM | DIASTOLIC BLOOD PRESSURE: 59 MMHG | SYSTOLIC BLOOD PRESSURE: 104 MMHG | RESPIRATION RATE: 19 BRPM

## 2024-11-29 DIAGNOSIS — N83.209 RUPTURED CYST OF OVARY: ICD-10-CM

## 2024-11-29 LAB
ALBUMIN SERPL BCG-MCNC: 4.5 G/DL (ref 3.5–5.2)
ALBUMIN UR-MCNC: 30 MG/DL
ALP SERPL-CCNC: 45 U/L (ref 40–150)
ALT SERPL W P-5'-P-CCNC: 16 U/L (ref 0–50)
ANION GAP SERPL CALCULATED.3IONS-SCNC: 11 MMOL/L (ref 7–15)
APPEARANCE UR: CLEAR
AST SERPL W P-5'-P-CCNC: 20 U/L (ref 0–45)
BASOPHILS # BLD AUTO: 0 10E3/UL (ref 0–0.2)
BASOPHILS NFR BLD AUTO: 0 %
BILIRUB SERPL-MCNC: 0.5 MG/DL
BILIRUB UR QL STRIP: NEGATIVE
BUN SERPL-MCNC: 11.5 MG/DL (ref 6–20)
CALCIUM SERPL-MCNC: 9 MG/DL (ref 8.8–10.4)
CHLORIDE SERPL-SCNC: 104 MMOL/L (ref 98–107)
COLOR UR AUTO: YELLOW
CREAT SERPL-MCNC: 0.86 MG/DL (ref 0.51–0.95)
EGFRCR SERPLBLD CKD-EPI 2021: 84 ML/MIN/1.73M2
EOSINOPHIL # BLD AUTO: 0.1 10E3/UL (ref 0–0.7)
EOSINOPHIL NFR BLD AUTO: 1 %
ERYTHROCYTE [DISTWIDTH] IN BLOOD BY AUTOMATED COUNT: 12.6 % (ref 10–15)
GLUCOSE SERPL-MCNC: 110 MG/DL (ref 70–99)
GLUCOSE UR STRIP-MCNC: NEGATIVE MG/DL
HCO3 SERPL-SCNC: 23 MMOL/L (ref 22–29)
HCT VFR BLD AUTO: 36.8 % (ref 35–47)
HGB BLD-MCNC: 13 G/DL (ref 11.7–15.7)
HGB UR QL STRIP: NEGATIVE
IMM GRANULOCYTES # BLD: 0 10E3/UL
IMM GRANULOCYTES NFR BLD: 0 %
KETONES UR STRIP-MCNC: ABNORMAL MG/DL
LEUKOCYTE ESTERASE UR QL STRIP: NEGATIVE
LIPASE SERPL-CCNC: 42 U/L (ref 13–60)
LYMPHOCYTES # BLD AUTO: 1.3 10E3/UL (ref 0.8–5.3)
LYMPHOCYTES NFR BLD AUTO: 11 %
MCH RBC QN AUTO: 29 PG (ref 26.5–33)
MCHC RBC AUTO-ENTMCNC: 35.3 G/DL (ref 31.5–36.5)
MCV RBC AUTO: 82 FL (ref 78–100)
MONOCYTES # BLD AUTO: 0.5 10E3/UL (ref 0–1.3)
MONOCYTES NFR BLD AUTO: 4 %
MUCOUS THREADS #/AREA URNS LPF: PRESENT /LPF
NEUTROPHILS # BLD AUTO: 10 10E3/UL (ref 1.6–8.3)
NEUTROPHILS NFR BLD AUTO: 84 %
NITRATE UR QL: NEGATIVE
NRBC # BLD AUTO: 0 10E3/UL
NRBC BLD AUTO-RTO: 0 /100
PH UR STRIP: 8 [PH] (ref 5–7)
PLATELET # BLD AUTO: 184 10E3/UL (ref 150–450)
POTASSIUM SERPL-SCNC: 4.1 MMOL/L (ref 3.4–5.3)
PROT SERPL-MCNC: 6.7 G/DL (ref 6.4–8.3)
RBC # BLD AUTO: 4.48 10E6/UL (ref 3.8–5.2)
RBC URINE: 1 /HPF
SODIUM SERPL-SCNC: 138 MMOL/L (ref 135–145)
SP GR UR STRIP: 1.02 (ref 1–1.03)
SQUAMOUS EPITHELIAL: 1 /HPF
UROBILINOGEN UR STRIP-MCNC: <2 MG/DL
WBC # BLD AUTO: 11.8 10E3/UL (ref 4–11)
WBC URINE: 1 /HPF

## 2024-11-29 PROCEDURE — 82040 ASSAY OF SERUM ALBUMIN: CPT | Performed by: STUDENT IN AN ORGANIZED HEALTH CARE EDUCATION/TRAINING PROGRAM

## 2024-11-29 PROCEDURE — 85018 HEMOGLOBIN: CPT | Performed by: STUDENT IN AN ORGANIZED HEALTH CARE EDUCATION/TRAINING PROGRAM

## 2024-11-29 PROCEDURE — 81001 URINALYSIS AUTO W/SCOPE: CPT | Performed by: STUDENT IN AN ORGANIZED HEALTH CARE EDUCATION/TRAINING PROGRAM

## 2024-11-29 PROCEDURE — 36415 COLL VENOUS BLD VENIPUNCTURE: CPT | Performed by: STUDENT IN AN ORGANIZED HEALTH CARE EDUCATION/TRAINING PROGRAM

## 2024-11-29 PROCEDURE — 96376 TX/PRO/DX INJ SAME DRUG ADON: CPT

## 2024-11-29 PROCEDURE — 250N000011 HC RX IP 250 OP 636: Performed by: EMERGENCY MEDICINE

## 2024-11-29 PROCEDURE — 84155 ASSAY OF PROTEIN SERUM: CPT | Performed by: STUDENT IN AN ORGANIZED HEALTH CARE EDUCATION/TRAINING PROGRAM

## 2024-11-29 PROCEDURE — 250N000011 HC RX IP 250 OP 636: Performed by: STUDENT IN AN ORGANIZED HEALTH CARE EDUCATION/TRAINING PROGRAM

## 2024-11-29 PROCEDURE — 258N000003 HC RX IP 258 OP 636: Performed by: STUDENT IN AN ORGANIZED HEALTH CARE EDUCATION/TRAINING PROGRAM

## 2024-11-29 PROCEDURE — 93976 VASCULAR STUDY: CPT | Mod: XS

## 2024-11-29 PROCEDURE — 74176 CT ABD & PELVIS W/O CONTRAST: CPT

## 2024-11-29 PROCEDURE — 96374 THER/PROPH/DIAG INJ IV PUSH: CPT

## 2024-11-29 PROCEDURE — 96361 HYDRATE IV INFUSION ADD-ON: CPT

## 2024-11-29 PROCEDURE — 99285 EMERGENCY DEPT VISIT HI MDM: CPT | Mod: 25

## 2024-11-29 PROCEDURE — 96375 TX/PRO/DX INJ NEW DRUG ADDON: CPT

## 2024-11-29 PROCEDURE — 83690 ASSAY OF LIPASE: CPT | Performed by: STUDENT IN AN ORGANIZED HEALTH CARE EDUCATION/TRAINING PROGRAM

## 2024-11-29 PROCEDURE — 82565 ASSAY OF CREATININE: CPT | Performed by: STUDENT IN AN ORGANIZED HEALTH CARE EDUCATION/TRAINING PROGRAM

## 2024-11-29 PROCEDURE — 85004 AUTOMATED DIFF WBC COUNT: CPT | Performed by: STUDENT IN AN ORGANIZED HEALTH CARE EDUCATION/TRAINING PROGRAM

## 2024-11-29 RX ORDER — HYDROMORPHONE HCL IN WATER/PF 6 MG/30 ML
0.5 PATIENT CONTROLLED ANALGESIA SYRINGE INTRAVENOUS
Status: DISCONTINUED | OUTPATIENT
Start: 2024-11-29 | End: 2024-11-29 | Stop reason: HOSPADM

## 2024-11-29 RX ORDER — ONDANSETRON 2 MG/ML
4 INJECTION INTRAMUSCULAR; INTRAVENOUS ONCE
Status: COMPLETED | OUTPATIENT
Start: 2024-11-29 | End: 2024-11-29

## 2024-11-29 RX ORDER — KETOROLAC TROMETHAMINE 15 MG/ML
15 INJECTION, SOLUTION INTRAMUSCULAR; INTRAVENOUS ONCE
Status: COMPLETED | OUTPATIENT
Start: 2024-11-29 | End: 2024-11-29

## 2024-11-29 RX ORDER — OXYCODONE HYDROCHLORIDE 5 MG/1
5 TABLET ORAL EVERY 4 HOURS PRN
Qty: 6 TABLET | Refills: 0 | Status: SHIPPED | OUTPATIENT
Start: 2024-11-29 | End: 2024-12-03

## 2024-11-29 RX ADMIN — ONDANSETRON 4 MG: 2 INJECTION INTRAMUSCULAR; INTRAVENOUS at 06:14

## 2024-11-29 RX ADMIN — HYDROMORPHONE HYDROCHLORIDE 0.5 MG: 0.2 INJECTION, SOLUTION INTRAMUSCULAR; INTRAVENOUS; SUBCUTANEOUS at 09:22

## 2024-11-29 RX ADMIN — HYDROMORPHONE HYDROCHLORIDE 1 MG: 1 INJECTION, SOLUTION INTRAMUSCULAR; INTRAVENOUS; SUBCUTANEOUS at 06:31

## 2024-11-29 RX ADMIN — KETOROLAC TROMETHAMINE 15 MG: 15 INJECTION, SOLUTION INTRAMUSCULAR; INTRAVENOUS at 06:13

## 2024-11-29 RX ADMIN — SODIUM CHLORIDE, POTASSIUM CHLORIDE, SODIUM LACTATE AND CALCIUM CHLORIDE 1000 ML: 600; 310; 30; 20 INJECTION, SOLUTION INTRAVENOUS at 07:05

## 2024-11-29 ASSESSMENT — ACTIVITIES OF DAILY LIVING (ADL)
ADLS_ACUITY_SCORE: 41

## 2024-11-29 ASSESSMENT — COLUMBIA-SUICIDE SEVERITY RATING SCALE - C-SSRS
6. HAVE YOU EVER DONE ANYTHING, STARTED TO DO ANYTHING, OR PREPARED TO DO ANYTHING TO END YOUR LIFE?: NO
1. IN THE PAST MONTH, HAVE YOU WISHED YOU WERE DEAD OR WISHED YOU COULD GO TO SLEEP AND NOT WAKE UP?: NO
2. HAVE YOU ACTUALLY HAD ANY THOUGHTS OF KILLING YOURSELF IN THE PAST MONTH?: NO

## 2024-11-29 NOTE — ED TRIAGE NOTES
"Arrives to be assessed for flank pain, right side. Pain began 0300 and \"comes in waves\". Urinated once and had pain with urination. No medication prior to arrival.      Triage Assessment (Adult)       Row Name 11/29/24 0582          Triage Assessment    Airway WDL WDL        Respiratory WDL    Respiratory WDL WDL        Skin Circulation/Temperature WDL    Skin Circulation/Temperature WDL WDL        Cardiac WDL    Cardiac WDL WDL        Peripheral/Neurovascular WDL    Peripheral Neurovascular WDL WDL        Cognitive/Neuro/Behavioral WDL    Cognitive/Neuro/Behavioral WDL WDL                     "

## 2024-11-29 NOTE — ED PROVIDER NOTES
EMERGENCY DEPARTMENT SIGN OUT NOTE        ED COURSE AND MEDICAL DECISION MAKING  Patient was signed out to me by Dr Junito Cabello at 0700    In brief, Orquidea Mckeon is a 44 year old female who initially presented with sudden onset right flank pain     At time of sign out, disposition was pending CT results, UA results    0830 Patient reassessed.  Pain to RLQ now starting to ramp up again.  Discussed results and plan to proceed with ultrasound    11:00 AM Discussed results with patient.  Pain improving.  Abdominal exam without rebound or guarding.  Discussed symptomatic care for home.    FINAL IMPRESSION    No diagnosis found.    ED MEDS  Medications   HYDROmorphone (DILAUDID) injection 0.5 mg (0.5 mg Intravenous $Given 11/29/24 0922)   ketorolac (TORADOL) injection 15 mg (15 mg Intravenous $Given 11/29/24 0613)   ondansetron (ZOFRAN) injection 4 mg (4 mg Intravenous $Given 11/29/24 0614)   HYDROmorphone (DILAUDID) injection 1 mg (1 mg Intravenous $Given 11/29/24 0631)   lactated ringers BOLUS 1,000 mL (0 mLs Intravenous Stopped 11/29/24 1105)       LAB  Labs Ordered and Resulted from Time of ED Arrival to Time of ED Departure   COMPREHENSIVE METABOLIC PANEL - Abnormal       Result Value    Sodium 138      Potassium 4.1      Carbon Dioxide (CO2) 23      Anion Gap 11      Urea Nitrogen 11.5      Creatinine 0.86      GFR Estimate 84      Calcium 9.0      Chloride 104      Glucose 110 (*)     Alkaline Phosphatase 45      AST 20      ALT 16      Protein Total 6.7      Albumin 4.5      Bilirubin Total 0.5     CBC WITH PLATELETS AND DIFFERENTIAL - Abnormal    WBC Count 11.8 (*)     RBC Count 4.48      Hemoglobin 13.0      Hematocrit 36.8      MCV 82      MCH 29.0      MCHC 35.3      RDW 12.6      Platelet Count 184      % Neutrophils 84      % Lymphocytes 11      % Monocytes 4      % Eosinophils 1      % Basophils 0      % Immature Granulocytes 0      NRBCs per 100 WBC 0      Absolute Neutrophils 10.0 (*)     Absolute  Lymphocytes 1.3      Absolute Monocytes 0.5      Absolute Eosinophils 0.1      Absolute Basophils 0.0      Absolute Immature Granulocytes 0.0      Absolute NRBCs 0.0     ROUTINE UA WITH MICROSCOPIC REFLEX TO CULTURE - Abnormal    Color Urine Yellow      Appearance Urine Clear      Glucose Urine Negative      Bilirubin Urine Negative      Ketones Urine Trace (*)     Specific Gravity Urine 1.024      Blood Urine Negative      pH Urine 8.0 (*)     Protein Albumin Urine 30 (*)     Urobilinogen Urine <2.0      Nitrite Urine Negative      Leukocyte Esterase Urine Negative      Mucus Urine Present (*)     RBC Urine 1      WBC Urine 1      Squamous Epithelials Urine 1     LIPASE - Normal    Lipase 42         RADIOLOGY    US Pelvis Cmplt w Transvag & Doppler LmtPel Duplex Limited   Final Result   IMPRESSION:     1.  Collapsing 2.1 cm right ovarian hemorrhagic corpus luteal cyst and small amount of pelvic free fluid likely reflecting recent cyst rupture.   2.  No convincing sonographic evidence for right ovarian torsion.   3.  Hysterectomy and nonvisualized left ovary.         HEMORRHAGIC CYSTS:   Premenopausal women/early post-menopausal (<5 years from LMP):   <=5 cm: no follow up needed.       REFERENCE:   Management of Asymptomatic Ovarian and Other Adnexal Cysts Imaged at US: Society of Radiologists in Ultrasound Consensus Conference Statement. Radiology September 2010; [id number].      Simple Adnexal Cysts: SRU Consensus Conference Update on Follow-up and Reporting. Radiology September 2019. [id number].      CT Abdomen Pelvis w/o Contrast   Final Result   IMPRESSION:    1.  No nephroureterolithiasis or hydronephrosis.   2.  Known multiple benign hepatic hemangiomas appear grossly stable in size with the dominant central hemangioma stably measuring approximately 8.5 x 4.9 cm.   3.  2 clips are seen within the anterior pelvis on today's CT, previously these were in the posterior pelvis. These may represent mobile  previous tubal ligation clips.   4.  Hysterectomy. Crenulated appearing cyst of the right ovary. This is favored to represent a ruptured cyst/follicle, which could represent a source of right pelvic pain. If clinically indicated, a pelvic ultrasound could be obtained for further    evaluation.   5.  Small stable probable Bartholin gland cyst with internal hemorrhagic debris.             DISCHARGE MEDS  New Prescriptions    No medications on file         Estelle Cabello DO  Emergency Medicine  [facility] EMERGENCY DEPARTMENT  [facility] [facility] [address] [phone]     Estelle Cabello DO  11/29/24 3473

## 2024-11-29 NOTE — DISCHARGE INSTRUCTIONS
Continue heating pad  Tylenol 1000 mg every 6-8 hours  Motrin 600 mg every 6 hours  Roxicodone as needed for severe pain  Return if any uncontrolled pain, fever or any other concerns

## 2024-11-29 NOTE — ED PROVIDER NOTES
Emergency Department Encounter         FINAL IMPRESSION:    Flank pain          ED COURSE AND MEDICAL DECISION MAKING            Patient is a 44-year-old female here for sudden onset right flank that woke her from sleep around 3 hours ago.  Increased nausea vomiting.  As well as dysuria.    Arrival she looks uncomfortable.  Hunched over the bed.  Right flank pain to palpation with no rash or midline pain.  Status post appendectomy.    Plan for labs CT pain meds fluids reevaluate     -Patient signed out to oncoming doc              Medical Decision Making  Obtained supplemental history:Supplemental history obtained?: No  Reviewed external records: External records reviewed?: No  Care impacted by chronic illness:Documented in Chart  Did you consider but not order tests?: Work up considered but not performed and documented in chart, if applicable  Did you interpret images independently?: Independent interpretation of ECG and images noted in documentation, when applicable.  Consultation discussion with other provider:Did you involve another provider (consultant, MH, pharmacy, etc.)?: No  Admission considered. Patient was signed out to the oncoming physician, disposition pending.    MIPS: Not Applicable            MEDICATIONS GIVEN IN THE EMERGENCY DEPARTMENT:  Medications   ketorolac (TORADOL) injection 15 mg (has no administration in time range)   ondansetron (ZOFRAN) injection 4 mg (has no administration in time range)       NEW PRESCRIPTIONS STARTED AT TODAY'S ED VISIT:  New Prescriptions    No medications on file       HPI     44-year-old female here with right flank pain that woke her from sleep 3 hours ago.  Mild nausea no vomiting.  No black or bloody stools.  No dysuria or  vaginal discharge or bleeding patient looks uncomfortable        MEDICAL HISTORY     Past Medical History:   Diagnosis Date    Hemangioma        Past Surgical History:   Procedure Laterality Date    APPENDECTOMY      BREAST SURGERY       COSMETIC MAMMOPLASTY AUGMENTATION Bilateral 3/20/2024    Procedure: MAMMOPLASTY, AUGMENTATION, COSMETIC BILATERAL;  Surgeon: Pankaj Mccrary MD;  Location: [facility] OR    COSMETIC MASTOPEXY BILATERAL Bilateral 3/20/2024    Procedure: MASTOPEXY, BILATERAL, COSMETIC;  Surgeon: Pankaj Mccrary MD;  Location: [facility] OR    LAPAROSCOPIC ASSISTED HYSTERECTOMY VAGINAL N/A 2020    Procedure: LAPAROSCOPIC ASSISTED VAGINAL HYSTERECTOMY BILATERAL SALPINGECTOMY CYSTOSCOPY;  Surgeon: Chelsea Mitchell MD;  Location: [facility];  Service: Gynecology    REMOVE IMPLANT BREAST Bilateral 3/20/2024    Procedure: REMOVAL, IMPLANT, BREAST BILATERAL;  Surgeon: Pankaj Mccrary MD;  [place] [facility] [place]    Z LIGATE FALLOPIAN TUBE      Description: Tubal Ligation;  Recorded: 2013;  Comments: for sterilization on        Social History     Tobacco Use    Smoking status: Former     Current packs/day: 0.00     Types: Cigarettes     Quit date: 3/4/2006     Years since quittin.7    Smokeless tobacco: Never   Vaping Use    Vaping status: Never Used   Substance Use Topics    Alcohol use: Yes     Alcohol/week: 1.0 standard drink of alcohol     Comment: rare    Drug use: No       albuterol (PROAIR HFA/PROVENTIL HFA/VENTOLIN HFA) 108 (90 Base) MCG/ACT inhaler  cephALEXin (KEFLEX) 500 MG capsule  desvenlafaxine succinate (PRISTIQ) 25 MG 24 hr tablet  dicyclomine (BENTYL) 20 MG tablet  diphenhydrAMINE (BENADRYL ALLERGY) 25 MG capsule  docusate sodium (COLACE) 100 MG capsule  ketoconazole (NIZORAL) 2 % external cream            PHYSICAL EXAM     /60   Pulse 72   Temp 98.4  F (36.9  C)   Resp 19   Wt 57.5 kg (126 lb 12.8 oz)   LMP  (LMP Unknown)   SpO2 100%   BMI 23.96 kg/m        PHYSICAL EXAM:     General: Patient looks uncomfortable  HEENT: Moist mucous membranes,  No head trauma.    Cardiovascular: Normal rate, normal rhythm, no extremity edema.  No appreciable murmur.  Respiratory: No signs of respiratory  distress, lungs are clear to auscultation bilaterally with no wheezes rhonchi or rales.  Abdominal: Soft, right flank pain, nondistended, no palpable masses, no guarding, no rebound  Musculoskeletal: Full range of motion of joints, no deformities appreciated.  Neurological: Alert and oriented, grossly neurologically intact.  Psychological: Normal affect and mood.  Integument: No rashes appreciated        RESULTS       Labs Ordered and Resulted from Time of ED Arrival to Time of ED Departure - No data to display    CT Abdomen Pelvis w/o Contrast    (Results Pending)         PROCEDURES:  Procedures:  Procedures         Junito Cabello DO  Emergency Medicine  [facility] EMERGENCY DEPARTMENT       Junito Cabello DO  11/30/24 0758

## 2024-12-02 ENCOUNTER — PATIENT OUTREACH (OUTPATIENT)
Dept: FAMILY MEDICINE | Facility: CLINIC | Age: 44
End: 2024-12-02
Payer: COMMERCIAL

## 2024-12-02 NOTE — TELEPHONE ENCOUNTER
Transitions of Care Outreach  Chief Complaint   Patient presents with    Hospital F/U       Most Recent Admission Date: 11/29/2024   Most Recent Admission Diagnosis:      Most Recent Discharge Date: 11/29/2024   Most Recent Discharge Diagnosis: Ruptured cyst of ovary - N83.209     Transitions of Care Assessment    Discharge Assessment  How are you doing now that you are home?: Patient is still uncomfortable and having pain  How are your symptoms? (Red Flag symptoms escalate to triage hotline per guidelines): Improved  Do you know how to contact your clinic care team if you have future questions or changes to your health status? : Yes  Does the patient have their discharge instructions? : Yes  Does the patient have questions regarding their discharge instructions? : No  Were you started on any new medications or were there changes to any of your previous medications? : Yes  Does the patient have all of their medications?: Yes  Do you have questions regarding any of your medications? : No    Follow up Plan     Discharge Follow-Up  Discharge follow up appointment scheduled in alignment with recommended follow up timeframe or Transitions of Risk Category? (Low = within 30 days; Moderate= within 14 days; High= within 7 days): Yes  Discharge Follow Up Appointment Date: 12/10/24  Discharge Follow Up Appointment Scheduled with?: Primary Care Provider    Future Appointments   Date Time Provider Department Center   12/10/2024  8:40 AM Vianey Lux MD [facility]   1/6/2025  1:10 PM Brenda Suarez NP ENT [facility]       Outpatient Plan as outlined on AVS reviewed with patient.    For any urgent concerns, please contact our 24 hour nurse triage line: [phone] ([phone])       Keyla Gutierrez RN

## 2024-12-04 ENCOUNTER — HOSPITAL ENCOUNTER (EMERGENCY)
Facility: HOSPITAL | Age: 44
Discharge: HOME | End: 2024-12-04
Attending: EMERGENCY MEDICINE
Payer: COMMERCIAL

## 2024-12-04 ENCOUNTER — APPOINTMENT (OUTPATIENT)
Dept: CT IMAGING | Facility: HOSPITAL | Age: 44
End: 2024-12-04
Attending: EMERGENCY MEDICINE
Payer: COMMERCIAL

## 2024-12-04 VITALS
TEMPERATURE: 98.1 F | BODY MASS INDEX: 24.6 KG/M2 | SYSTOLIC BLOOD PRESSURE: 125 MMHG | HEART RATE: 57 BPM | RESPIRATION RATE: 24 BRPM | DIASTOLIC BLOOD PRESSURE: 57 MMHG | HEIGHT: 61 IN | OXYGEN SATURATION: 98 % | WEIGHT: 130.3 LBS

## 2024-12-04 DIAGNOSIS — R14.0 ABDOMINAL BLOATING: ICD-10-CM

## 2024-12-04 LAB
ALBUMIN SERPL BCG-MCNC: 4.6 G/DL (ref 3.5–5.2)
ALBUMIN UR-MCNC: NEGATIVE MG/DL
ALP SERPL-CCNC: 49 U/L (ref 40–150)
ALT SERPL W P-5'-P-CCNC: 17 U/L (ref 0–50)
ANION GAP SERPL CALCULATED.3IONS-SCNC: 8 MMOL/L (ref 7–15)
APPEARANCE UR: CLEAR
AST SERPL W P-5'-P-CCNC: 19 U/L (ref 0–45)
BASOPHILS # BLD AUTO: 0 10E3/UL (ref 0–0.2)
BASOPHILS NFR BLD AUTO: 0 %
BILIRUB DIRECT SERPL-MCNC: <0.2 MG/DL (ref 0–0.3)
BILIRUB SERPL-MCNC: 0.3 MG/DL
BILIRUB UR QL STRIP: NEGATIVE
BUN SERPL-MCNC: 7.2 MG/DL (ref 6–20)
CALCIUM SERPL-MCNC: 9.2 MG/DL (ref 8.8–10.4)
CHLORIDE SERPL-SCNC: 106 MMOL/L (ref 98–107)
COLOR UR AUTO: COLORLESS
CREAT SERPL-MCNC: 0.85 MG/DL (ref 0.51–0.95)
EGFRCR SERPLBLD CKD-EPI 2021: 86 ML/MIN/1.73M2
EOSINOPHIL # BLD AUTO: 0.3 10E3/UL (ref 0–0.7)
EOSINOPHIL NFR BLD AUTO: 5 %
ERYTHROCYTE [DISTWIDTH] IN BLOOD BY AUTOMATED COUNT: 12.7 % (ref 10–15)
GLUCOSE SERPL-MCNC: 72 MG/DL (ref 70–99)
GLUCOSE UR STRIP-MCNC: NEGATIVE MG/DL
HCO3 SERPL-SCNC: 27 MMOL/L (ref 22–29)
HCT VFR BLD AUTO: 40.4 % (ref 35–47)
HGB BLD-MCNC: 14 G/DL (ref 11.7–15.7)
HGB UR QL STRIP: NEGATIVE
IMM GRANULOCYTES # BLD: 0 10E3/UL
IMM GRANULOCYTES NFR BLD: 0 %
KETONES UR STRIP-MCNC: NEGATIVE MG/DL
LEUKOCYTE ESTERASE UR QL STRIP: NEGATIVE
LIPASE SERPL-CCNC: 46 U/L (ref 13–60)
LYMPHOCYTES # BLD AUTO: 2.1 10E3/UL (ref 0.8–5.3)
LYMPHOCYTES NFR BLD AUTO: 29 %
MCH RBC QN AUTO: 29 PG (ref 26.5–33)
MCHC RBC AUTO-ENTMCNC: 34.7 G/DL (ref 31.5–36.5)
MCV RBC AUTO: 84 FL (ref 78–100)
MONOCYTES # BLD AUTO: 0.4 10E3/UL (ref 0–1.3)
MONOCYTES NFR BLD AUTO: 5 %
NEUTROPHILS # BLD AUTO: 4.3 10E3/UL (ref 1.6–8.3)
NEUTROPHILS NFR BLD AUTO: 60 %
NITRATE UR QL: NEGATIVE
NRBC # BLD AUTO: 0 10E3/UL
NRBC BLD AUTO-RTO: 0 /100
PH UR STRIP: 7 [PH] (ref 5–7)
PLATELET # BLD AUTO: 219 10E3/UL (ref 150–450)
POTASSIUM SERPL-SCNC: 3.8 MMOL/L (ref 3.4–5.3)
PROT SERPL-MCNC: 6.8 G/DL (ref 6.4–8.3)
RBC # BLD AUTO: 4.82 10E6/UL (ref 3.8–5.2)
RBC URINE: <1 /HPF
SODIUM SERPL-SCNC: 141 MMOL/L (ref 135–145)
SP GR UR STRIP: 1 (ref 1–1.03)
UROBILINOGEN UR STRIP-MCNC: <2 MG/DL
WBC # BLD AUTO: 7.2 10E3/UL (ref 4–11)
WBC URINE: <1 /HPF

## 2024-12-04 PROCEDURE — 74176 CT ABD & PELVIS W/O CONTRAST: CPT

## 2024-12-04 PROCEDURE — 80048 BASIC METABOLIC PNL TOTAL CA: CPT | Performed by: EMERGENCY MEDICINE

## 2024-12-04 PROCEDURE — 96374 THER/PROPH/DIAG INJ IV PUSH: CPT

## 2024-12-04 PROCEDURE — 80053 COMPREHEN METABOLIC PANEL: CPT | Performed by: EMERGENCY MEDICINE

## 2024-12-04 PROCEDURE — 96375 TX/PRO/DX INJ NEW DRUG ADDON: CPT

## 2024-12-04 PROCEDURE — 258N000003 HC RX IP 258 OP 636: Performed by: EMERGENCY MEDICINE

## 2024-12-04 PROCEDURE — 81003 URINALYSIS AUTO W/O SCOPE: CPT | Performed by: EMERGENCY MEDICINE

## 2024-12-04 PROCEDURE — 85004 AUTOMATED DIFF WBC COUNT: CPT | Performed by: EMERGENCY MEDICINE

## 2024-12-04 PROCEDURE — 36415 COLL VENOUS BLD VENIPUNCTURE: CPT | Performed by: EMERGENCY MEDICINE

## 2024-12-04 PROCEDURE — 84460 ALANINE AMINO (ALT) (SGPT): CPT | Performed by: EMERGENCY MEDICINE

## 2024-12-04 PROCEDURE — 99285 EMERGENCY DEPT VISIT HI MDM: CPT | Mod: 25

## 2024-12-04 PROCEDURE — 84155 ASSAY OF PROTEIN SERUM: CPT | Performed by: EMERGENCY MEDICINE

## 2024-12-04 PROCEDURE — 83690 ASSAY OF LIPASE: CPT | Performed by: EMERGENCY MEDICINE

## 2024-12-04 PROCEDURE — 250N000011 HC RX IP 250 OP 636: Mod: JZ | Performed by: EMERGENCY MEDICINE

## 2024-12-04 PROCEDURE — 85041 AUTOMATED RBC COUNT: CPT | Performed by: EMERGENCY MEDICINE

## 2024-12-04 PROCEDURE — 96361 HYDRATE IV INFUSION ADD-ON: CPT

## 2024-12-04 RX ORDER — MORPHINE SULFATE 4 MG/ML
4 INJECTION, SOLUTION INTRAMUSCULAR; INTRAVENOUS ONCE
Status: COMPLETED | OUTPATIENT
Start: 2024-12-04 | End: 2024-12-04

## 2024-12-04 RX ORDER — KETOROLAC TROMETHAMINE 15 MG/ML
15 INJECTION, SOLUTION INTRAMUSCULAR; INTRAVENOUS ONCE
Status: COMPLETED | OUTPATIENT
Start: 2024-12-04 | End: 2024-12-04

## 2024-12-04 RX ADMIN — MORPHINE SULFATE 4 MG: 4 INJECTION, SOLUTION INTRAMUSCULAR; INTRAVENOUS at 18:32

## 2024-12-04 RX ADMIN — KETOROLAC TROMETHAMINE 15 MG: 15 INJECTION, SOLUTION INTRAMUSCULAR; INTRAVENOUS at 17:14

## 2024-12-04 RX ADMIN — SODIUM CHLORIDE 1000 ML: 9 INJECTION, SOLUTION INTRAVENOUS at 17:16

## 2024-12-04 ASSESSMENT — ACTIVITIES OF DAILY LIVING (ADL): ADLS_ACUITY_SCORE: 41

## 2024-12-04 ASSESSMENT — COLUMBIA-SUICIDE SEVERITY RATING SCALE - C-SSRS
2. HAVE YOU ACTUALLY HAD ANY THOUGHTS OF KILLING YOURSELF IN THE PAST MONTH?: NO
1. IN THE PAST MONTH, HAVE YOU WISHED YOU WERE DEAD OR WISHED YOU COULD GO TO SLEEP AND NOT WAKE UP?: NO
6. HAVE YOU EVER DONE ANYTHING, STARTED TO DO ANYTHING, OR PREPARED TO DO ANYTHING TO END YOUR LIFE?: NO

## 2024-12-04 NOTE — ED PROVIDER NOTES
EMERGENCY DEPARTMENT ENCOUNTER      NAME: Orquidea Mckeon  AGE: 44 year old female  YOB: 1980  MRN: [id number]  EVALUATION DATE & TIME: No admission date for patient encounter.    PCP: Vianey Lux    ED PROVIDER: Willy Blake D.O.      Chief Complaint   Patient presents with    Abdominal Pain    Bloated    Rash     back       FINAL IMPRESSION:  1. Abdominal bloating        ED COURSE & MEDICAL DECISION MAKING:    3:32 PM I met with the patient to gather history and to perform my initial exam. I discussed the plan for care while in the Emergency Department.  7:05 PM Reevaluated and updated patient. We discussed plan for discharge as well as supportive cares at home and reasons for return to the ED including new or worsening symptoms. All questions and concerns addressed. Patient to be discharged by RN. They are agreeable and comfortable with plan.        Pertinent Labs & Imaging studies reviewed. (See chart for details)  44 year old female presents to the Emergency Department for evaluation of abdominal pain and bloating.  Patient reports that the symptoms been going on for several days, but the bloating started today.  No vomiting or nausea.  Patient has been having bowel movements.  Initial concern for obstruction versus volvulus versus other acute cause.  Her abdominal exam other than some mild distention was fairly benign therefore less concern for the potential for surgical abdomen but did perform CT imaging.  Fortunately on imaging there is no evidence of acute process that could easily explain her symptoms, including no evidence of inflammation or obvious surgical pathology or obstruction.  At this time, after discussion with the patient she is comfortable with discharge home with outpatient follow-up with primary care provider.  Return precautions were discussed.    Medical Decision Making  Obtained supplemental history:Supplemental history obtained?: No  Reviewed external records:  "External records reviewed?: No  Care impacted by chronic illness:Documented in Chart  Did you consider but not order tests?: Work up considered but not performed and documented in chart, if applicable  Did you interpret images independently?: Independent interpretation of ECG and images noted in documentation, when applicable.  Consultation discussion with other provider:Did you involve another provider (consultant, , pharmacy, etc.)?: No  Discharge. No recommendations on prescription strength medication(s). See documentation for any additional details.    MIPS: Not Applicable        At the conclusion of the encounter I discussed the results of all of the tests and the disposition. The questions were answered. The patient or family acknowledged understanding and was agreeable with the care plan.      HPI    Patient information was obtained from: patient    Use of : N/A        Orquidea Mckeon is a 44 year old female who presents via walk in for evaluation of abdominal pain, bloating, and rash.    1) Abdominal pain and bloating. Patient reports two days of abdominal bloating, causing discomfort. She notes that her stomach looks more distended than usual. She describes the discomfort as \"pressure\". She was seen for an ovarian cyst on 11/29 and reports that the pain has resolved from that, but the pressure continues. She also reports diarrhea (two days) and fatigue. Her appetite has been decreased today, but she is supplementing with fluids. Denies fever or emesis.    2) Patient reports a new rash on her upper back. Denies chest pain or shortness of breath.     Medical History: GERD, liver hemangioma, HSV1    Per Chart Review: Seen 11/29 at [facility] ED for sudden onset right flank pain. US Pelvis showed collapsing 2.1 cm right ovarian hemorrhagic corpus luteal cyst and small amount of pelvic free fluid likely reflecting recent cyst rupture. Given Zofran, Toradol, and Dilaudid in the ED. Discharged home " with oxycodone.      PAST MEDICAL HISTORY:  Past Medical History:   Diagnosis Date    Hemangioma     location: liver       PAST SURGICAL HISTORY:  Past Surgical History:   Procedure Laterality Date    APPENDECTOMY      BREAST SURGERY      COSMETIC MAMMOPLASTY AUGMENTATION Bilateral 3/20/2024    Procedure: MAMMOPLASTY, AUGMENTATION, COSMETIC BILATERAL;  Surgeon: Pankaj Mccrary MD;  Location: [facility] OR    COSMETIC MASTOPEXY BILATERAL Bilateral 3/20/2024    Procedure: MASTOPEXY, BILATERAL, COSMETIC;  Surgeon: Pankaj Mccrary MD;  Location: [facility] OR    LAPAROSCOPIC ASSISTED HYSTERECTOMY VAGINAL N/A 8/20/2020    Procedure: LAPAROSCOPIC ASSISTED VAGINAL HYSTERECTOMY BILATERAL SALPINGECTOMY CYSTOSCOPY;  Surgeon: Chelsea Mitchell MD;  Location: [facility];  Service: Gynecology    REMOVE IMPLANT BREAST Bilateral 3/20/2024    Procedure: REMOVAL, IMPLANT, BREAST BILATERAL;  Surgeon: Pankaj Mccrary MD;  [place] [facility] [place]    [facility] LIGATE FALLOPIAN TUBE      Description: Tubal Ligation;  Recorded: 06/27/2013;  Comments: for sterilization on 7/09         CURRENT MEDICATIONS:    No current facility-administered medications for this encounter.     Current Outpatient Medications   Medication Sig Dispense Refill    albuterol (PROAIR HFA/PROVENTIL HFA/VENTOLIN HFA) 108 (90 Base) MCG/ACT inhaler Inhale 1-2 puffs into the lungs every 4 hours as needed for shortness of breath / dyspnea or wheezing 8.5 g 0    cephALEXin (KEFLEX) 500 MG capsule Take 1 capsule (500 mg) by mouth 3 times daily (Patient not taking: Reported on 12/2/2024) 21 capsule 0    desvenlafaxine succinate (PRISTIQ) 25 MG 24 hr tablet TAKE 1 TABLET(25 MG) BY MOUTH DAILY 90 tablet 3    dicyclomine (BENTYL) 20 MG tablet Take 1 tablet (20 mg) by mouth 4 times daily as needed (abdominal pain) 20 tablet 0    diphenhydrAMINE (BENADRYL ALLERGY) 25 MG capsule Administer 30 min - 2 hours pre - IV contrast injection (Patient not taking: Reported on 12/2/2024) 2 capsule  0    docusate sodium (COLACE) 100 MG capsule Take 1 capsule (100 mg) by mouth 2 times daily (Patient not taking: Reported on 2024) 20 capsule 0    ketoconazole (NIZORAL) 2 % external cream Apply topically daily (Patient not taking: Reported on 2024) 30 g 1         ALLERGIES:  Allergies   Allergen Reactions    Blood-Group Specific Substance Unknown     Anti-D   present. Expect delays in blood for transfusion.  Draw 2 lavender  for type and screen orders.    Iodinated Contrast Media [Iodinated Contrast Media] Hives       FAMILY HISTORY:  Family History   Problem Relation Age of Onset    Hemangiomas Sister     Hemangiomas Brother     Breast Cancer Maternal Grandmother     Kidney Disease Maternal Grandfather     Alcoholism Maternal Grandfather     Breast Cancer Maternal Aunt        SOCIAL HISTORY:  Social History     Socioeconomic History    Marital status:    Tobacco Use    Smoking status: Former     Current packs/day: 0.00     Types: Cigarettes     Quit date: 3/4/2006     Years since quittin.7    Smokeless tobacco: Never   Vaping Use    Vaping status: Never Used   Substance and Sexual Activity    Alcohol use: Yes     Alcohol/week: 1.0 standard drink of alcohol     Comment: rare    Drug use: No    Sexual activity: Yes     Partners: Male     Birth control/protection: Surgical     Social Drivers of Health     Financial Resource Strain: Low Risk  (2024)    Financial Resource Strain     Within the past 12 months, have you or your family members you live with been unable to get utilities (heat, electricity) when it was really needed?: No   Food Insecurity: Low Risk  (2024)    Food Insecurity     Within the past 12 months, did you worry that your food would run out before you got money to buy more?: No     Within the past 12 months, did the food you bought just not last and you didn t have money to get more?: No   Transportation Needs: Low Risk  (2024)    Transportation Needs     Within the  "past 12 months, has lack of transportation kept you from medical appointments, getting your medicines, non-medical meetings or appointments, work, or from getting things that you need?: No   Interpersonal Safety: Low Risk  (2/2/2024)    Interpersonal Safety     Do you feel physically and emotionally safe where you currently live?: Yes     Within the past 12 months, have you been hit, slapped, kicked or otherwise physically hurt by someone?: No     Within the past 12 months, have you been humiliated or emotionally abused in other ways by your partner or ex-partner?: No   Housing Stability: Low Risk  (2/2/2024)    Housing Stability     Do you have housing? : Yes     Are you worried about losing your housing?: No       VITALS:  Patient Vitals for the past 24 hrs:   BP Temp Temp src Pulse Resp SpO2 Height Weight   12/04/24 1456 (!) 157/69 98.1  F (36.7  C) Temporal 73 24 99 % 1.549 m (5' 1\") 59.1 kg (130 lb 4.8 oz)       PHYSICAL EXAM    VITAL SIGNS: BP (!) 157/69   Pulse 73   Temp 98.1  F (36.7  C) (Temporal)   Resp 24   Ht 1.549 m (5' 1\")   Wt 59.1 kg (130 lb 4.8 oz)   LMP  (LMP Unknown)   SpO2 99%   BMI 24.62 kg/m      General Appearance: Well-appearing, well-nourished, no acute distress. Laying in exam bed.  Head:  Normocephalic, without obvious abnormality, atraumatic  Eyes:  PERRL, conjunctiva/corneas clear, EOM's intact,  ENT:  Lips, mucosa, and tongue normal, membranes are moist without pallor  Neck:  Normal ROM, symmetrical, trachea midline    Chest:  No tenderness or deformity, no crepitus  Cardio:  Regular rate and rhythm, no murmur, rub or gallop, 2+ pulses symmetric in all extremities  Pulm:  Clear to auscultation bilaterally, respirations unlabored,  Back:  ROM normal, no CVA tenderness, no spinal tenderness, no paraspinal tenderness. Entire back has erythematous lacy rash.  Abdomen:  Distended abdomen with diffuse mild tenderness. No rebound or guarding.  Musculoskeletal: Full ROM, no edema, no " cyanosis, good ROM of major joints  Integument:  Warm, Dry, No erythema, No rash.    Neurologic:  Alert & oriented.  No focal deficits appreciated.    Psychiatric:  Affect normal, Judgment normal, Mood normal.      LABS  Results for orders placed or performed during the hospital encounter of 12/04/24 (from the past 24 hours)   UA with Microscopic reflex to Culture    Specimen: Urine, Clean Catch   Result Value Ref Range    Color Urine Colorless Colorless, Straw, Light Yellow, Yellow    Appearance Urine Clear Clear    Glucose Urine Negative Negative mg/dL    Bilirubin Urine Negative Negative    Ketones Urine Negative Negative mg/dL    Specific Gravity Urine 1.002 1.001 - 1.030    Blood Urine Negative Negative    pH Urine 7.0 5.0 - 7.0    Protein Albumin Urine Negative Negative mg/dL    Urobilinogen Urine <2.0 <2.0 mg/dL    Nitrite Urine Negative Negative    Leukocyte Esterase Urine Negative Negative    RBC Urine <1 <=2 /HPF    WBC Urine <1 <=5 /HPF    Narrative    Urine Culture not indicated   CBC with platelets + differential    Narrative    The following orders were created for panel order CBC with platelets + differential.  Procedure                               Abnormality         Status                     ---------                               -----------         ------                     CBC with platelets and d...[[id number]]                      Final result                 Please view results for these tests on the individual orders.   Basic metabolic panel   Result Value Ref Range    Sodium 141 135 - 145 mmol/L    Potassium 3.8 3.4 - 5.3 mmol/L    Chloride 106 98 - 107 mmol/L    Carbon Dioxide (CO2) 27 22 - 29 mmol/L    Anion Gap 8 7 - 15 mmol/L    Urea Nitrogen 7.2 6.0 - 20.0 mg/dL    Creatinine 0.85 0.51 - 0.95 mg/dL    GFR Estimate 86 >60 mL/min/1.73m2    Calcium 9.2 8.8 - 10.4 mg/dL    Glucose 72 70 - 99 mg/dL   Hepatic function panel   Result Value Ref Range    Protein Total 6.8 6.4 - 8.3 g/dL     Albumin 4.6 3.5 - 5.2 g/dL    Bilirubin Total 0.3 <=1.2 mg/dL    Alkaline Phosphatase 49 40 - 150 U/L    AST 19 0 - 45 U/L    ALT 17 0 - 50 U/L    Bilirubin Direct <0.20 0.00 - 0.30 mg/dL   Lipase   Result Value Ref Range    Lipase 46 13 - 60 U/L   CBC with platelets and differential   Result Value Ref Range    WBC Count 7.2 4.0 - 11.0 10e3/uL    RBC Count 4.82 3.80 - 5.20 10e6/uL    Hemoglobin 14.0 11.7 - 15.7 g/dL    Hematocrit 40.4 35.0 - 47.0 %    MCV 84 78 - 100 fL    MCH 29.0 26.5 - 33.0 pg    MCHC 34.7 31.5 - 36.5 g/dL    RDW 12.7 10.0 - 15.0 %    Platelet Count 219 150 - 450 10e3/uL    % Neutrophils 60 %    % Lymphocytes 29 %    % Monocytes 5 %    % Eosinophils 5 %    % Basophils 0 %    % Immature Granulocytes 0 %    NRBCs per 100 WBC 0 <1 /100    Absolute Neutrophils 4.3 1.6 - 8.3 10e3/uL    Absolute Lymphocytes 2.1 0.8 - 5.3 10e3/uL    Absolute Monocytes 0.4 0.0 - 1.3 10e3/uL    Absolute Eosinophils 0.3 0.0 - 0.7 10e3/uL    Absolute Basophils 0.0 0.0 - 0.2 10e3/uL    Absolute Immature Granulocytes 0.0 <=0.4 10e3/uL    Absolute NRBCs 0.0 10e3/uL   CT Abdomen Pelvis w/o Contrast    Narrative    EXAM: CT ABDOMEN PELVIS W/O CONTRAST  LOCATION: [facility]  DATE: 12/4/2024    INDICATION: Diffuse abdominal pain and bloating (has contrast allergy)  COMPARISON: 11/29/2024. MRI 2/20/2024  TECHNIQUE: CT scan of the abdomen and pelvis was performed without IV contrast. Multiplanar reformats were obtained. Dose reduction techniques were used.  CONTRAST: None.    FINDINGS:   LOWER CHEST: Normal.    HEPATOBILIARY: No change in liver lesions including the large central low-attenuation lesion demonstrated to represent benign hemangiomata previously.    PANCREAS: Normal.    SPLEEN: Normal.    ADRENAL GLANDS: Normal.    KIDNEYS/BLADDER: No stones or hydronephrosis. Left-sided periureteral phleboliths are unchanged.    BOWEL: No obstruction or inflammatory change.    LYMPH NODES:  Normal.    VASCULATURE: Normal.    PELVIC ORGANS: No adnexal lesions or free fluid. Surgical clips again identified, now within posterior pelvis.    MUSCULOSKELETAL: Normal.      Impression    IMPRESSION:   1.  No etiology for symptoms evident. Nothing obstructive or inflammatory involving bowel.  2.  Stable liver lesions previously shown to represent benign hemangiomata.           RADIOLOGY  CT Abdomen Pelvis w/o Contrast   Final Result   IMPRESSION:    1.  No etiology for symptoms evident. Nothing obstructive or inflammatory involving bowel.   2.  Stable liver lesions previously shown to represent benign hemangiomata.                  MEDICATIONS GIVEN IN THE EMERGENCY:  Medications   sodium chloride 0.9% BOLUS 1,000 mL (0 mLs Intravenous Stopped 12/4/24 1800)   morphine (PF) injection 4 mg (4 mg Intravenous $Given 12/4/24 1832)   ketorolac (TORADOL) injection 15 mg (15 mg Intravenous $Given 12/4/24 1714)       NEW PRESCRIPTIONS STARTED AT TODAY'S ER VISIT  New Prescriptions    No medications on file        I, Sandra Hurtado, am serving as a scribe to document services personally performed by Willy Blake D.O., based on my observations and the provider's statements to me.  I, Willy Blake D.O., attest that Sandra Hurtado is acting in a scribe capacity, has observed my performance of the services and has documented them in accordance with my direction.     Willy Blake D.O.  Emergency Medicine  [facility] EMERGENCY DEPARTMENT  [address] [place] [id number]  [phone]  Dept: [phone]       Willy Blake,   12/04/24 7084

## 2024-12-04 NOTE — ED TRIAGE NOTES
Pt presents with upper abd pain and bloating. Pt was seen last week for a ruptured ovarian cyst and things have improved but this bloating started. Pt states she has diarrhea for 2 days and not this discomfort. Pt also has a rash on her back.     Triage Assessment (Adult)       Row Name 12/04/24 1458          Triage Assessment    Airway WDL WDL        Respiratory WDL    Respiratory WDL WDL        Skin Circulation/Temperature WDL    Skin Circulation/Temperature WDL WDL        Cardiac WDL    Cardiac WDL WDL        Peripheral/Neurovascular WDL    Peripheral Neurovascular WDL WDL        Cognitive/Neuro/Behavioral WDL    Cognitive/Neuro/Behavioral WDL WDL

## 2024-12-09 NOTE — PROGRESS NOTES
Assessment/Plan:    Ovarian cyst rupture  Overall doing well - improving, though still some cramping - suspect related to pelvic fluid that has not yet been reabsorbed. Pt will continue to monitor. If symptoms worsen or do not resolve over the next couple of weeks then she will message and we can repeat pelvic US. Discussed using tylenol/ibuprofen as needed, heat to area.        Follow up: as needed    Vianey Lux MD  [facility]    Subjective:   Orquidea Mckeon is a 44 year old female is here today for ER follow-up - ovarian cyst rupture    -seen on ER 11/29 - R flank pain, cyst rupture on CT/US  -seen again on ER 12/4 - abd pain, bloating, rash - related to cyst rupture    -improving - bowels/gas pretty much back to normal  -some pelvic cramping - reminds her of uterine cramps  -hx total hysterectomy      Patient Active Problem List   Diagnosis    Herpes Simplex Type I    Gastroesophageal reflux disease, esophagitis presence not specified    PMDD (premenstrual dysphoric disorder)    Hemangioma of liver    Eczema    Lightheadedness    Dizziness    History of breast augmentation    Rupture of implant of left breast, subsequent encounter     Past Medical History:   Diagnosis Date    Hemangioma     location: liver     Past Surgical History:   Procedure Laterality Date    APPENDECTOMY      BREAST SURGERY      COSMETIC MAMMOPLASTY AUGMENTATION Bilateral 3/20/2024    Procedure: MAMMOPLASTY, AUGMENTATION, COSMETIC BILATERAL;  Surgeon: Pankaj Mccrary MD;  [place] [facility] [place]    COSMETIC MASTOPEXY BILATERAL Bilateral 3/20/2024    Procedure: MASTOPEXY, BILATERAL, COSMETIC;  Surgeon: Pankaj Mccrary MD;  [place] [facility] [place]    LAPAROSCOPIC ASSISTED HYSTERECTOMY VAGINAL N/A 8/20/2020    Procedure: LAPAROSCOPIC ASSISTED VAGINAL HYSTERECTOMY BILATERAL SALPINGECTOMY CYSTOSCOPY;  Surgeon: Chelsea Mitchell MD;  Location: [facility];  Service: Gynecology    REMOVE IMPLANT BREAST Bilateral 3/20/2024     Procedure: REMOVAL, IMPLANT, BREAST BILATERAL;  Surgeon: Pankaj Mccrary MD;  [place] [facility] [place]    [facility] LIGATE FALLOPIAN TUBE      Description: Tubal Ligation;  Recorded: 2013;  Comments: for sterilization on      Current Outpatient Medications   Medication Sig Dispense Refill    albuterol (PROAIR HFA/PROVENTIL HFA/VENTOLIN HFA) 108 (90 Base) MCG/ACT inhaler Inhale 1-2 puffs into the lungs every 4 hours as needed for shortness of breath / dyspnea or wheezing 8.5 g 0    desvenlafaxine succinate (PRISTIQ) 25 MG 24 hr tablet TAKE 1 TABLET(25 MG) BY MOUTH DAILY 90 tablet 3    dicyclomine (BENTYL) 20 MG tablet Take 1 tablet (20 mg) by mouth 4 times daily as needed (abdominal pain) 20 tablet 0    diphenhydrAMINE (BENADRYL ALLERGY) 25 MG capsule Administer 30 min - 2 hours pre - IV contrast injection 2 capsule 0    docusate sodium (COLACE) 100 MG capsule Take 1 capsule (100 mg) by mouth 2 times daily 20 capsule 0    ketoconazole (NIZORAL) 2 % external cream Apply topically daily 30 g 1     No current facility-administered medications for this visit.     Allergies   Allergen Reactions    Blood-Group Specific Substance Unknown     Anti-D   present. Expect delays in blood for transfusion.  Draw 2 lavender  for type and screen orders.    Iodinated Contrast Media [Iodinated Contrast Media] Hives     Social History     Socioeconomic History    Marital status:      Spouse name: Not on file    Number of children: Not on file    Years of education: Not on file    Highest education level: Not on file   Occupational History    Not on file   Tobacco Use    Smoking status: Former     Current packs/day: 0.00     Types: Cigarettes     Quit date: 3/4/2006     Years since quittin.7    Smokeless tobacco: Never   Vaping Use    Vaping status: Never Used   Substance and Sexual Activity    Alcohol use: Yes     Alcohol/week: 1.0 standard drink of alcohol     Comment: rare    Drug use: No    Sexual activity: Yes      Partners: Male     Birth control/protection: Surgical   Other Topics Concern    Not on file   Social History Narrative    Not on file     Social Drivers of Health     Financial Resource Strain: Low Risk  (2/2/2024)    Financial Resource Strain     Within the past 12 months, have you or your family members you live with been unable to get utilities (heat, electricity) when it was really needed?: No   Food Insecurity: Low Risk  (2/2/2024)    Food Insecurity     Within the past 12 months, did you worry that your food would run out before you got money to buy more?: No     Within the past 12 months, did the food you bought just not last and you didn t have money to get more?: No   Transportation Needs: Low Risk  (2/2/2024)    Transportation Needs     Within the past 12 months, has lack of transportation kept you from medical appointments, getting your medicines, non-medical meetings or appointments, work, or from getting things that you need?: No   Physical Activity: Not on file   Stress: Not on file   Social Connections: Not on file   Interpersonal Safety: Low Risk  (12/10/2024)    Interpersonal Safety     Do you feel physically and emotionally safe where you currently live?: Yes     Within the past 12 months, have you been hit, slapped, kicked or otherwise physically hurt by someone?: No     Within the past 12 months, have you been humiliated or emotionally abused in other ways by your partner or ex-partner?: No   Housing Stability: Low Risk  (2/2/2024)    Housing Stability     Do you have housing? : Yes     Are you worried about losing your housing?: No     Family History   Problem Relation Age of Onset    Hemangiomas Sister     Hemangiomas Brother     Breast Cancer Maternal Grandmother     Kidney Disease Maternal Grandfather     Alcoholism Maternal Grandfather     Breast Cancer Maternal Aunt      Review of systems is as stated in HPI, and the remainder of system review is otherwise negative.    Objective:     BP  "110/70   Pulse 67   Temp 97.8  F (36.6  C) (Temporal)   Resp 21   Ht 1.549 m (5' 1\")   Wt 57.1 kg (125 lb 14.4 oz)   LMP  (LMP Unknown)   SpO2 97%   BMI 23.79 kg/m      General appearance: awake, NAD  HEENT: atraumatic, normocephalic, no scleral icterus or injection, moist mucous membranes  Lungs: breathing comfortably on room air  Abd: active bowel sounds, soft, non-distended, mildly tender in pelvic region  Extremities: moving all extremities  Neuro: alert, oriented x3, CNs grossly intact, no focal deficits appreciated  Psych: normal mood/affect/behavior, answering questions appropriately, linear thought process    "

## 2024-12-10 ENCOUNTER — OFFICE VISIT (OUTPATIENT)
Dept: FAMILY MEDICINE | Facility: CLINIC | Age: 44
End: 2024-12-10
Payer: COMMERCIAL

## 2024-12-10 VITALS
SYSTOLIC BLOOD PRESSURE: 110 MMHG | HEART RATE: 67 BPM | TEMPERATURE: 97.8 F | OXYGEN SATURATION: 97 % | DIASTOLIC BLOOD PRESSURE: 70 MMHG | WEIGHT: 125.9 LBS | BODY MASS INDEX: 23.77 KG/M2 | RESPIRATION RATE: 21 BRPM | HEIGHT: 61 IN

## 2024-12-10 DIAGNOSIS — N83.209 OVARIAN CYST RUPTURE: Primary | ICD-10-CM

## 2024-12-10 PROCEDURE — 99213 OFFICE O/P EST LOW 20 MIN: CPT | Performed by: FAMILY MEDICINE

## 2024-12-10 ASSESSMENT — PAIN SCALES - GENERAL: PAINLEVEL_OUTOF10: NO PAIN (0)

## 2025-01-06 ENCOUNTER — PRE VISIT (OUTPATIENT)
Dept: OTOLARYNGOLOGY | Facility: CLINIC | Age: 45
End: 2025-01-06

## 2025-02-24 NOTE — ANESTHESIA POSTPROCEDURE EVALUATION
[facility] Transportation Resources*  (Call [facility]/211 if need additional resources.)    [facility] Transit System  What they offer: Provides public transportation to the [facility] and parts of [place] and [facility].  Website: http://www.ridegrtc.com/  Phone Number: 654.137.9495  [facility] Specialized Services (CARE & CARE Plus)  What they offer: Provides public transportation access to individuals with disabilities who may not reasonably be able to use [facility] fixed route bus service. Provides escitz-dz-yrvgulprtnh services under the guidelines of the ADA.  Website: http://Intacct/services/specialized-transportation/care  Phone Number: 398.682.7333  [employer] Connection  What they offer: [employer] Connection provides 2 round trip rides/month to non-emergency medical appointments (must qualify)  Website:  https://seniorconnections-va.org/services/support-to-stay-home/ride-connections/  Phone Number: 789.430.8650  Eligibility Requirements: Individuals with disabilities (18+) or older adults (60+) and live in the [facility] or the [facility] of [place], [facility] and [place].  Sliding scale fee system.  You must give 7 days notice to schedules rides, which are subject to availability.      [employer]  What they offer: Donation based transportation services for veterans, families in need, the elderly, and those with disabilities.  Website: https://www.FullStory.TableGrabber/  Phone Number: 519.174.3655.  Please allow two weeks notice in scheduling rides, which are subject to availability.    Additional Information: Offers transport to appointments, grocery store, airport, etc. in the areas of [facility], [place], [place], and [place].      [facility] Transit - Community transit service serving [place], [place], [facility], [place], [place], [place], [place], [place],  Patient: Orquidea Mckeon  Procedure(s):  LAPAROSCOPIC ASSISTED VAGINAL HYSTERECTOMY BILATERAL SALPINGECTOMY CYSTOSCOPY  Anesthesia type: general    Patient location: PACU  Last vitals:   Vitals Value Taken Time   /61 8/20/2020  1:00 PM   Temp 36.8  C (98.2  F) 8/20/2020  1:10 PM   Pulse 60 8/20/2020  1:10 PM   Resp 16 8/20/2020  1:10 PM   SpO2 95 % 8/20/2020  1:10 PM   Vitals shown include unvalidated device data.  Post vital signs: stable  Level of consciousness: awake and responds to simple questions  Post-anesthesia pain: pain controlled  Post-anesthesia nausea and vomiting: no  Pulmonary: unassisted, return to baseline  Cardiovascular: stable and blood pressure at baseline  Hydration: adequate  Anesthetic events: no    QCDR Measures:  ASA# 11 - Yue-op Cardiac Arrest: ASA11B - Patient did NOT experience unanticipated cardiac arrest  ASA# 12 - Yue-op Mortality Rate: ASA12B - Patient did NOT die  ASA# 13 - PACU Re-Intubation Rate: ASA13B - Patient did NOT require a new airway mgmt  ASA# 10 - Composite Anes Safety: ASA10A - No serious adverse event    Additional Notes:

## 2025-03-15 ENCOUNTER — HEALTH MAINTENANCE LETTER (OUTPATIENT)
Age: 45
End: 2025-03-15

## 2025-03-31 NOTE — PROGRESS NOTES
Assessment/Plan:    Dermatitis  Unclear cause of rash, will trial triamcinolone, dermatology referral placed as well.   - triamcinolone (KENALOG) 0.1 % external cream  Dispense: 45 g; Refill: 0  - Adult Dermatology  Referral    Plantar warts  Pt with plantar warts, overall small/early in appearance - she will try OTC remedies at this time.        Follow up: as needed    Vianey Lux MD  [facility]    Subjective:   Orquidea Mckeon is a 44 year old female is here today for plantar warts    -left upper eye lid rash, see message from 2022 - using ketoconazole but not really working - wondering about seeing dermatology - hasn't been wearing eye shadow (though still wearing mascara), uses gentle makeup removal per report - itchy  -plantar warts on R foot - hasn't used any OTC yet, tried to pick at it with nail clipper    Answers submitted by the patient for this visit:  General Questionnaire (Submitted on 3/31/2025)  Chief Complaint: Chronic problems general questions HPI Form  What is the reason for your visit today? : Warts on my right foot  How many days per week do you miss taking your medication?: 0  Questionnaire about: Chronic problems general questions HPI Form (Submitted on 3/31/2025)  Chief Complaint: Chronic problems general questions HPI Form    Patient Active Problem List   Diagnosis    Herpes Simplex Type I    Gastroesophageal reflux disease, esophagitis presence not specified    PMDD (premenstrual dysphoric disorder)    Hemangioma of liver    Eczema    Lightheadedness    Dizziness    History of breast augmentation    Rupture of implant of left breast, subsequent encounter     Past Medical History:   Diagnosis Date    Hemangioma     location: liver     Past Surgical History:   Procedure Laterality Date    APPENDECTOMY      BREAST SURGERY      COSMETIC MAMMOPLASTY AUGMENTATION Bilateral 3/20/2024    Procedure: MAMMOPLASTY, AUGMENTATION, COSMETIC BILATERAL;  Surgeon: Demetra  MD Pankaj;  Location: [facility] OR    COSMETIC MASTOPEXY BILATERAL Bilateral 3/20/2024    Procedure: MASTOPEXY, BILATERAL, COSMETIC;  Surgeon: Pankaj Mccrary MD;  Location: [facility] OR    LAPAROSCOPIC ASSISTED HYSTERECTOMY VAGINAL N/A 8/20/2020    Procedure: LAPAROSCOPIC ASSISTED VAGINAL HYSTERECTOMY BILATERAL SALPINGECTOMY CYSTOSCOPY;  Surgeon: Chelsea Mitchell MD;  Location: [facility];  Service: Gynecology    REMOVE IMPLANT BREAST Bilateral 3/20/2024    Procedure: REMOVAL, IMPLANT, BREAST BILATERAL;  Surgeon: Pankaj Mccrary MD;  Location: [facility] OR    [facility] LIGATE FALLOPIAN TUBE      Description: Tubal Ligation;  Recorded: 06/27/2013;  Comments: for sterilization on 7/09     Current Outpatient Medications   Medication Sig Dispense Refill    albuterol (PROAIR HFA/PROVENTIL HFA/VENTOLIN HFA) 108 (90 Base) MCG/ACT inhaler Inhale 1-2 puffs into the lungs every 4 hours as needed for shortness of breath / dyspnea or wheezing 8.5 g 0    desvenlafaxine succinate (PRISTIQ) 25 MG 24 hr tablet TAKE 1 TABLET(25 MG) BY MOUTH DAILY 90 tablet 3    dicyclomine (BENTYL) 20 MG tablet Take 1 tablet (20 mg) by mouth 4 times daily as needed (abdominal pain) 20 tablet 0    diphenhydrAMINE (BENADRYL ALLERGY) 25 MG capsule Administer 30 min - 2 hours pre - IV contrast injection 2 capsule 0    docusate sodium (COLACE) 100 MG capsule Take 1 capsule (100 mg) by mouth 2 times daily 20 capsule 0    ketoconazole (NIZORAL) 2 % external cream Apply topically daily 30 g 1    triamcinolone (KENALOG) 0.1 % external cream Apply topically 2 times daily. To eyelid rash - use for max 3 weeks and then take a week off. 45 g 0     No current facility-administered medications for this visit.     Allergies   Allergen Reactions    Blood-Group Specific Substance Unknown     Anti-D   present. Expect delays in blood for transfusion.  Draw 2 lavender  for type and screen orders.    Iodinated Contrast Media [Iodinated Contrast Media] Hives     Social  History     Socioeconomic History    Marital status:      Spouse name: Not on file    Number of children: Not on file    Years of education: Not on file    Highest education level: Not on file   Occupational History    Not on file   Tobacco Use    Smoking status: Former     Current packs/day: 0.00     Types: Cigarettes     Quit date: 3/4/2006     Years since quittin.0    Smokeless tobacco: Never   Vaping Use    Vaping status: Never Used   Substance and Sexual Activity    Alcohol use: Yes     Alcohol/week: 1.0 standard drink of alcohol     Comment: rare    Drug use: No    Sexual activity: Yes     Partners: Male     Birth control/protection: Surgical   Other Topics Concern    Not on file   Social History Narrative    Not on file     Social Drivers of Health     Financial Resource Strain: Low Risk  (2024)    Financial Resource Strain     Within the past 12 months, have you or your family members you live with been unable to get utilities (heat, electricity) when it was really needed?: No   Food Insecurity: Low Risk  (2024)    Food Insecurity     Within the past 12 months, did you worry that your food would run out before you got money to buy more?: No     Within the past 12 months, did the food you bought just not last and you didn t have money to get more?: No   Transportation Needs: Low Risk  (2024)    Transportation Needs     Within the past 12 months, has lack of transportation kept you from medical appointments, getting your medicines, non-medical meetings or appointments, work, or from getting things that you need?: No   Physical Activity: Not on file   Stress: Not on file   Social Connections: Not on file   Interpersonal Safety: Low Risk  (12/10/2024)    Interpersonal Safety     Do you feel physically and emotionally safe where you currently live?: Yes     Within the past 12 months, have you been hit, slapped, kicked or otherwise physically hurt by someone?: No     Within the past 12  "months, have you been humiliated or emotionally abused in other ways by your partner or ex-partner?: No   Housing Stability: Low Risk  (2/2/2024)    Housing Stability     Do you have housing? : Yes     Are you worried about losing your housing?: No     Family History   Problem Relation Age of Onset    Hemangiomas Sister     Hemangiomas Brother     Breast Cancer Maternal Grandmother     Kidney Disease Maternal Grandfather     Alcoholism Maternal Grandfather     Breast Cancer Maternal Aunt      Review of systems is as stated in HPI, and the remainder of system review is otherwise negative.    Objective:     /79 (BP Location: Left arm, Patient Position: Sitting, Cuff Size: Adult Regular)   Pulse 78   Temp 97.6  F (36.4  C) (Temporal)   Resp 18   Ht 1.549 m (5' 1\")   Wt 59.4 kg (131 lb)   LMP  (LMP Unknown)   SpO2 100%   BMI 24.75 kg/m      General appearance: awake, NAD  HEENT: atraumatic, normocephalic, no scleral icterus or injection  Lungs: breathing comfortably on room air  Skin: erythematous rash on L upper eyelid - multiple plantar warts on bottom of L foot  Neuro: alert, oriented x3, CNs grossly intact, no focal deficits appreciated  Psych: normal mood/affect/behavior, answering questions appropriately, linear thought process    "

## 2025-04-01 ENCOUNTER — OFFICE VISIT (OUTPATIENT)
Dept: FAMILY MEDICINE | Facility: CLINIC | Age: 45
End: 2025-04-01
Payer: COMMERCIAL

## 2025-04-01 VITALS
OXYGEN SATURATION: 100 % | BODY MASS INDEX: 24.73 KG/M2 | WEIGHT: 131 LBS | HEART RATE: 78 BPM | DIASTOLIC BLOOD PRESSURE: 79 MMHG | RESPIRATION RATE: 18 BRPM | HEIGHT: 61 IN | TEMPERATURE: 97.6 F | SYSTOLIC BLOOD PRESSURE: 129 MMHG

## 2025-04-01 DIAGNOSIS — B07.0 PLANTAR WARTS: ICD-10-CM

## 2025-04-01 DIAGNOSIS — L30.9 DERMATITIS: Primary | ICD-10-CM

## 2025-04-01 PROCEDURE — 99213 OFFICE O/P EST LOW 20 MIN: CPT | Performed by: FAMILY MEDICINE

## 2025-04-01 PROCEDURE — 3078F DIAST BP <80 MM HG: CPT | Performed by: FAMILY MEDICINE

## 2025-04-01 PROCEDURE — 3074F SYST BP LT 130 MM HG: CPT | Performed by: FAMILY MEDICINE

## 2025-04-01 RX ORDER — TRIAMCINOLONE ACETONIDE 1 MG/G
CREAM TOPICAL 2 TIMES DAILY
Qty: 45 G | Refills: 0 | Status: SHIPPED | OUTPATIENT
Start: 2025-04-01

## 2025-04-01 NOTE — PATIENT INSTRUCTIONS
Plantar warts   -try compound W solution nightly and cover with duct tape  -usually takes weeks to months to go away

## 2025-06-17 NOTE — PROGRESS NOTES
"Assessment/Plan:    Alteration in neurologic function  Abnormal finding on MRI of brain  Given unusual neurologic symptoms recently and MRI brain from 2023 noting some stable but abnormal findings (possible gliosis), recommend further evaluation with repeat brain MRI.   - MR Brain w/o & w Contrast    Encounter for screening for malignant neoplasm of breast, unspecified screening modality  Pt hesitant to do another mammogram as last year it resulted in rupture of her breast implant. Will look into possible coverage for breast MRI instead.     Hemangioma of liver  Discussed reasonable to do yearly LFTs and RUQ US every 2-3 years for monitoring purposes.     Plantar warts  Not responding to OTC treatments and worsening/spreading on feet - will return for cryotherapy.        Follow up: as needed    Vianey Lux MD  [facility]    Subjective:   Orquidea Mckeon is a 44 year old female is here today for neurological concerns    -seen at urgency room on 6/10 for paresthesias, epigastric pain, generalized weakness - some focal numbness/tingling bilateral lower face but not in stroke distribution - EKG nml, CBC normal, troponin normal, LFTs normal, BMP normal  HPI per their note  \"Orquidea Mckeon is a 44 y.o. female who presents for evaluation of numbness and discomfort in her right arm, as well as multiple other symptoms. The patient states that for the last 3 weeks she has been noticing right forearm pain, like something is clamping down on her right forearm. She has been helping her mother redo her house, so she thought this was just muscle pain, but 2 days ago she started having aching throughout her whole body. Around this time she started having tingling/numbness to the bottom half of her face; she describes decreased sensation to touch around her cheeks/jaw. These symptoms improved yesterday, but today the symptoms returned along with indigestion/pain in the epigastric region. She reports her entire " "body feeling achy, but the pain is worse on the right side, most significant in her forearm. She has felt intermittent lightheadedness, and occasionally will difficulty focusing during those episodes. She describes this as a different feeling from the vertigo symptoms she has had in the past. She denies any nausea, vomiting, shortness of breath, fevers, cough, sore throat, runny nose, leg swelling. She does not take any daily medications. She is a non-smoker. \"  -prior brain MRIs 2017 and 2023 - 2023 had noted \" a few T2 hyperintense foci in the white matter are nonspecific but most commonly reflect gliosis related to prior ischemic or inflammatory insult. These are unchanged.\"    -R forearm pain started maybe a couple months ago and then was having tingling in entire R arm and tingling in both sides of face  -has gotten better - feels like it is her muscles - feels weaker  -feels like muscles more achy  -no vision loss or double vision - more floaters  -thinks is becoming near sighted  -states had spinal meningitis as a baby  -is R handed    -fam hx breast cancer: mom's side great gma, gma, aunt   -mom no breast cancer as of now  -last mammogram ruptured her saline breast implant - resulting in extra medical visits and surgery ($20,000+ and recovery time)   -pt hesitant to do another mammogram - was told silicone implants less likely to rupture with mammogram    Patient Active Problem List   Diagnosis    Herpes Simplex Type I    Gastroesophageal reflux disease, esophagitis presence not specified    PMDD (premenstrual dysphoric disorder)    Hemangioma of liver    Eczema    Lightheadedness    Dizziness    History of breast augmentation    Rupture of implant of left breast, subsequent encounter     Past Medical History:   Diagnosis Date    Hemangioma     location: liver     Past Surgical History:   Procedure Laterality Date    ABDOMEN SURGERY      APPENDECTOMY      BREAST SURGERY      COSMETIC MAMMOPLASTY AUGMENTATION " Bilateral 03/20/2024    Procedure: MAMMOPLASTY, AUGMENTATION, COSMETIC BILATERAL;  Surgeon: Pankaj Mccrary MD;  Location: [facility] OR    COSMETIC MASTOPEXY BILATERAL Bilateral 03/20/2024    Procedure: MASTOPEXY, BILATERAL, COSMETIC;  Surgeon: Pankaj Mccrary MD;  Location: [facility] OR    LAPAROSCOPIC ASSISTED HYSTERECTOMY VAGINAL N/A 08/20/2020    Procedure: LAPAROSCOPIC ASSISTED VAGINAL HYSTERECTOMY BILATERAL SALPINGECTOMY CYSTOSCOPY;  Surgeon: Chelsea Mitchell MD;  Location: [facility];  Service: Gynecology    REMOVE IMPLANT BREAST Bilateral 03/20/2024    Procedure: REMOVAL, IMPLANT, BREAST BILATERAL;  Surgeon: Pankaj Mccrary MD;  Location: [facility] OR    [facility] LIGATE FALLOPIAN TUBE      Description: Tubal Ligation;  Recorded: 06/27/2013;  Comments: for sterilization on 7/09     Current Outpatient Medications   Medication Sig Dispense Refill    albuterol (PROAIR HFA/PROVENTIL HFA/VENTOLIN HFA) 108 (90 Base) MCG/ACT inhaler Inhale 1-2 puffs into the lungs every 4 hours as needed for shortness of breath / dyspnea or wheezing 8.5 g 0    desvenlafaxine succinate (PRISTIQ) 25 MG 24 hr tablet TAKE 1 TABLET(25 MG) BY MOUTH DAILY 90 tablet 3    dicyclomine (BENTYL) 20 MG tablet Take 1 tablet (20 mg) by mouth 4 times daily as needed (abdominal pain) 20 tablet 0    diphenhydrAMINE (BENADRYL ALLERGY) 25 MG capsule Administer 30 min - 2 hours pre - IV contrast injection 2 capsule 0    docusate sodium (COLACE) 100 MG capsule Take 1 capsule (100 mg) by mouth 2 times daily 20 capsule 0    ketoconazole (NIZORAL) 2 % external cream Apply topically daily 30 g 1    triamcinolone (KENALOG) 0.1 % external cream Apply topically 2 times daily. To eyelid rash - use for max 3 weeks and then take a week off. 45 g 0     No current facility-administered medications for this visit.     Allergies   Allergen Reactions    Blood-Group Specific Substance Unknown     Anti-D   present. Expect delays in blood for transfusion.  Draw 2 lavender   for type and screen orders.    Iodinated Contrast Media [Iodinated Contrast Media] Hives     Social History     Socioeconomic History    Marital status:      Spouse name: Not on file    Number of children: Not on file    Years of education: Not on file    Highest education level: Not on file   Occupational History    Not on file   Tobacco Use    Smoking status: Former     Current packs/day: 0.00     Types: Cigarettes     Quit date: 3/4/2006     Years since quittin.3    Smokeless tobacco: Never   Vaping Use    Vaping status: Never Used   Substance and Sexual Activity    Alcohol use: Yes     Alcohol/week: 1.0 standard drink of alcohol     Comment: rare    Drug use: No    Sexual activity: Yes     Partners: Male     Birth control/protection: Surgical   Other Topics Concern    Not on file   Social History Narrative    Not on file     Social Drivers of Health     Financial Resource Strain: Low Risk  (2024)    Financial Resource Strain     Within the past 12 months, have you or your family members you live with been unable to get utilities (heat, electricity) when it was really needed?: No   Food Insecurity: Low Risk  (2024)    Food Insecurity     Within the past 12 months, did you worry that your food would run out before you got money to buy more?: No     Within the past 12 months, did the food you bought just not last and you didn t have money to get more?: No   Transportation Needs: Low Risk  (2024)    Transportation Needs     Within the past 12 months, has lack of transportation kept you from medical appointments, getting your medicines, non-medical meetings or appointments, work, or from getting things that you need?: No   Physical Activity: Not on file   Stress: Not on file   Social Connections: Not on file   Interpersonal Safety: Low Risk  (2025)    Interpersonal Safety     Do you feel physically and emotionally safe where you currently live?: Yes     Within the past 12 months, have you  "been hit, slapped, kicked or otherwise physically hurt by someone?: No     Within the past 12 months, have you been humiliated or emotionally abused in other ways by your partner or ex-partner?: No   Housing Stability: Low Risk  (2/2/2024)    Housing Stability     Do you have housing? : Yes     Are you worried about losing your housing?: No     Family History   Problem Relation Age of Onset    Hemangiomas Sister     Hemangiomas Brother     Breast Cancer Maternal Grandmother     Kidney Disease Maternal Grandfather     Alcoholism Maternal Grandfather     Breast Cancer Maternal Aunt     Diabetes Cousin      Review of systems is as stated in HPI, and the remainder of system review is otherwise negative.    Objective:     /67   Pulse 64   Temp 97.6  F (36.4  C) (Temporal)   Resp 16   Ht 1.549 m (5' 1\")   Wt 58.1 kg (128 lb)   LMP  (LMP Unknown)   SpO2 98%   BMI 24.19 kg/m      General appearance: awake, NAD  HEENT: atraumatic, normocephalic, no scleral icterus or injection  Lungs: breathing comfortably on room air  Skin: small numerous plantar warts  Neuro: alert, oriented x3, CNs grossly intact, subjective R arm and face sensation changes  Psych: normal mood/affect/behavior, answering questions appropriately, linear thought process    "

## 2025-06-18 ENCOUNTER — OFFICE VISIT (OUTPATIENT)
Dept: FAMILY MEDICINE | Facility: CLINIC | Age: 45
End: 2025-06-18
Payer: COMMERCIAL

## 2025-06-18 VITALS
WEIGHT: 128 LBS | RESPIRATION RATE: 16 BRPM | DIASTOLIC BLOOD PRESSURE: 67 MMHG | BODY MASS INDEX: 24.17 KG/M2 | HEART RATE: 64 BPM | HEIGHT: 61 IN | SYSTOLIC BLOOD PRESSURE: 108 MMHG | OXYGEN SATURATION: 98 % | TEMPERATURE: 97.6 F

## 2025-06-18 DIAGNOSIS — R29.90 ALTERATION IN NEUROLOGIC FUNCTION: Primary | ICD-10-CM

## 2025-06-18 DIAGNOSIS — Z91.041 CONTRAST MEDIA ALLERGY: ICD-10-CM

## 2025-06-18 DIAGNOSIS — R90.89 ABNORMAL FINDING ON MRI OF BRAIN: ICD-10-CM

## 2025-06-18 DIAGNOSIS — B07.0 PLANTAR WARTS: ICD-10-CM

## 2025-06-18 DIAGNOSIS — D18.03 HEMANGIOMA OF LIVER: ICD-10-CM

## 2025-06-18 DIAGNOSIS — Z12.39 ENCOUNTER FOR SCREENING FOR MALIGNANT NEOPLASM OF BREAST, UNSPECIFIED SCREENING MODALITY: ICD-10-CM

## 2025-06-18 PROCEDURE — 3078F DIAST BP <80 MM HG: CPT | Performed by: FAMILY MEDICINE

## 2025-06-18 PROCEDURE — 3074F SYST BP LT 130 MM HG: CPT | Performed by: FAMILY MEDICINE

## 2025-06-18 PROCEDURE — 99214 OFFICE O/P EST MOD 30 MIN: CPT | Performed by: FAMILY MEDICINE

## 2025-06-18 RX ORDER — PREDNISONE 50 MG/1
TABLET ORAL
Qty: 3 TABLET | Refills: 0 | Status: SHIPPED | OUTPATIENT
Start: 2025-06-18

## 2025-06-19 NOTE — PATIENT INSTRUCTIONS
Medication to take prior contrast to prevent allergic reaction:  -Three doses of 50 mg oral prednisone administered 13 hours, 7 hours, and 1 hour prior to contrast administration, plus 50 mg oral diphenhydramine/benadryl administered one hour prior to contrast administration

## 2025-07-05 ENCOUNTER — HOSPITAL ENCOUNTER (OUTPATIENT)
Dept: MRI IMAGING | Facility: HOSPITAL | Age: 45
Discharge: HOME | End: 2025-07-05
Attending: FAMILY MEDICINE | Admitting: FAMILY MEDICINE
Payer: COMMERCIAL

## 2025-07-05 DIAGNOSIS — R29.90 ALTERATION IN NEUROLOGIC FUNCTION: ICD-10-CM

## 2025-07-05 DIAGNOSIS — R90.89 ABNORMAL FINDING ON MRI OF BRAIN: ICD-10-CM

## 2025-07-05 PROCEDURE — 255N000002 HC RX 255 OP 636: Performed by: FAMILY MEDICINE

## 2025-07-05 PROCEDURE — 70553 MRI BRAIN STEM W/O & W/DYE: CPT

## 2025-07-05 PROCEDURE — A9585 GADOBUTROL INJECTION: HCPCS | Performed by: FAMILY MEDICINE

## 2025-07-05 RX ORDER — GADOBUTROL 604.72 MG/ML
0.1 INJECTION INTRAVENOUS ONCE
Status: COMPLETED | OUTPATIENT
Start: 2025-07-05 | End: 2025-07-05

## 2025-07-05 RX ADMIN — GADOBUTROL 5.8 ML: 604.72 INJECTION INTRAVENOUS at 11:55

## 2025-07-07 ENCOUNTER — RESULTS FOLLOW-UP (OUTPATIENT)
Dept: FAMILY MEDICINE | Facility: CLINIC | Age: 45
End: 2025-07-07
Payer: COMMERCIAL

## 2025-07-07 DIAGNOSIS — R29.90 ALTERATION IN NEUROLOGIC FUNCTION: Primary | ICD-10-CM

## 2025-07-07 DIAGNOSIS — R90.89 ABNORMAL FINDING ON MRI OF BRAIN: ICD-10-CM

## 2025-07-08 ENCOUNTER — PATIENT OUTREACH (OUTPATIENT)
Dept: CARE COORDINATION | Facility: CLINIC | Age: 45
End: 2025-07-08
Payer: COMMERCIAL

## 2025-07-10 ENCOUNTER — PATIENT OUTREACH (OUTPATIENT)
Dept: CARE COORDINATION | Facility: CLINIC | Age: 45
End: 2025-07-10
Payer: COMMERCIAL

## 2025-08-06 ENCOUNTER — PATIENT OUTREACH (OUTPATIENT)
Dept: CARE COORDINATION | Facility: CLINIC | Age: 45
End: 2025-08-06
Payer: COMMERCIAL

## 2025-08-06 ENCOUNTER — MYC REFILL (OUTPATIENT)
Dept: FAMILY MEDICINE | Facility: CLINIC | Age: 45
End: 2025-08-06
Payer: COMMERCIAL

## 2025-08-06 DIAGNOSIS — R45.4 IRRITABILITY: ICD-10-CM

## 2025-08-06 RX ORDER — DESVENLAFAXINE 25 MG/1
25 TABLET, EXTENDED RELEASE ORAL DAILY
Qty: 90 TABLET | Refills: 3 | Status: SHIPPED | OUTPATIENT
Start: 2025-08-06

## (undated) DEVICE — SUCTION TIP YANKAUER W/O VENT K86

## (undated) DEVICE — PEN MARKING SKIN TYCO DEVON DUAL TIP 31145868

## (undated) DEVICE — DRSG KERLIX 4 1/2"X4YDS ROLL 6730

## (undated) DEVICE — GLOVE BIOGEL PI MICRO SZ 6.5 48565

## (undated) DEVICE — ESU CLEANER TIP 31142717

## (undated) DEVICE — PREP CHLORAPREP 26ML TINTED ORANGE  260815

## (undated) DEVICE — SOL NACL 0.9% IRRIG 1000ML BOTTLE 07138-09

## (undated) DEVICE — ESU ELEC BLADE HEX-LOCKING 2.5" E1450X

## (undated) DEVICE — GLOVE GAMMEX NEOPRENE ULTRA SZ 7.5 LF 8515

## (undated) DEVICE — STPL SKIN 35W ROTATING HEAD PRW35

## (undated) DEVICE — SYR BULB IRRIG DOVER 60 ML LATEX FREE 67000

## (undated) DEVICE — DRSG ABDOMINAL 07 1/2X8" 7197D

## (undated) DEVICE — LABEL MEDICATION SYSTEM 3303-P

## (undated) DEVICE — BLADE KNIFE SURG 15 371115

## (undated) DEVICE — GLOVE BIOGEL PI MICRO INDICATOR UNDERGLOVE SZ 7.0 48970

## (undated) DEVICE — ESU ELEC BLADE 6" COATED E1450-6

## (undated) DEVICE — BNDG ELASTIC 6"X5YDS UNSTERILE 6611-60

## (undated) DEVICE — SU PDS II 2-0 SH 27" Z317H

## (undated) DEVICE — STPL SKIN 35W 059037

## (undated) DEVICE — DRAPE IOBAN INCISE 23X17" 6650EZ

## (undated) DEVICE — BNDG ELASTIC 6" DBL LENGTH UNSTERILE 6611-16

## (undated) DEVICE — GLOVE BIOGEL PI MICRO INDICATOR UNDERGLOVE SZ 8.0 48980

## (undated) DEVICE — DRSG XEROFORM 5X9" 8884431605

## (undated) DEVICE — PACK MINOR CUSTOM ASC

## (undated) DEVICE — SU VICRYL 2-0 SH 27" UND J417H

## (undated) DEVICE — SPONGE LAP 18X18" X8435

## (undated) DEVICE — PREP CHLORHEXIDINE 4% 4OZ (HIBICLENS) 57504

## (undated) DEVICE — BASIN SET MINOR DISP

## (undated) DEVICE — ESU PENCIL SMOKE EVAC W/ROCKER SWITCH 0703-047-000

## (undated) DEVICE — SU WND CLOSURE VLOC 90 ABS 4-0 18" P-12 VLOCM0023

## (undated) DEVICE — SU MONOCRYL 4-0 PS-2 27" UND Y426H

## (undated) DEVICE — DRAPE SHEET REV FOLD 3/4 9349

## (undated) DEVICE — UNIVERSAL DRAPE PACK 29118

## (undated) DEVICE — SU MONOCRYL 3-0 SH 27" UND Y416H

## (undated) DEVICE — ESU GROUND PAD ADULT W/CORD E7507

## (undated) DEVICE — BOWL STERILE 32OZ DYND50320

## (undated) DEVICE — ESU ELEC BLADE 6" COATED/INSULATED E1455-6

## (undated) DEVICE — SUCTION MANIFOLD NEPTUNE 2 SYS 1 PORT 702-025-000

## (undated) DEVICE — KLEIN INFILTRATION SET SINGLE SPIKE ITS-10

## (undated) DEVICE — SUCTION SLEEVE NEPTUNE 2 165MM 0703-005-165

## (undated) DEVICE — LINEN TOWEL PACK X5 5464

## (undated) RX ORDER — TRANEXAMIC ACID 100 MG/ML
INJECTION, SOLUTION INTRAVENOUS
Status: DISPENSED
Start: 2024-03-20

## (undated) RX ORDER — GLYCOPYRROLATE 0.2 MG/ML
INJECTION INTRAMUSCULAR; INTRAVENOUS
Status: DISPENSED
Start: 2024-03-20

## (undated) RX ORDER — FENTANYL CITRATE-0.9 % NACL/PF 10 MCG/ML
PLASTIC BAG, INJECTION (ML) INTRAVENOUS
Status: DISPENSED
Start: 2024-03-20

## (undated) RX ORDER — CEFAZOLIN SODIUM 1 G/3ML
INJECTION, POWDER, FOR SOLUTION INTRAMUSCULAR; INTRAVENOUS
Status: DISPENSED
Start: 2024-03-20

## (undated) RX ORDER — FENTANYL CITRATE 50 UG/ML
INJECTION, SOLUTION INTRAMUSCULAR; INTRAVENOUS
Status: DISPENSED
Start: 2024-03-20

## (undated) RX ORDER — CEFAZOLIN SODIUM 2 G/50ML
SOLUTION INTRAVENOUS
Status: DISPENSED
Start: 2024-03-20

## (undated) RX ORDER — PROPOFOL 10 MG/ML
INJECTION, EMULSION INTRAVENOUS
Status: DISPENSED
Start: 2024-03-20

## (undated) RX ORDER — OXYCODONE HYDROCHLORIDE 5 MG/1
TABLET ORAL
Status: DISPENSED
Start: 2024-03-20

## (undated) RX ORDER — HYDROMORPHONE HYDROCHLORIDE 1 MG/ML
INJECTION, SOLUTION INTRAMUSCULAR; INTRAVENOUS; SUBCUTANEOUS
Status: DISPENSED
Start: 2024-03-20

## (undated) RX ORDER — GENTAMICIN 40 MG/ML
INJECTION, SOLUTION INTRAMUSCULAR; INTRAVENOUS
Status: DISPENSED
Start: 2024-03-20

## (undated) RX ORDER — DEXAMETHASONE SODIUM PHOSPHATE 4 MG/ML
INJECTION, SOLUTION INTRA-ARTICULAR; INTRALESIONAL; INTRAMUSCULAR; INTRAVENOUS; SOFT TISSUE
Status: DISPENSED
Start: 2024-03-20

## (undated) RX ORDER — ACETAMINOPHEN 325 MG/1
TABLET ORAL
Status: DISPENSED
Start: 2024-03-20

## (undated) RX ORDER — ONDANSETRON 2 MG/ML
INJECTION INTRAMUSCULAR; INTRAVENOUS
Status: DISPENSED
Start: 2024-03-20

## (undated) RX ORDER — EPINEPHRINE 1 MG/ML
INJECTION, SOLUTION INTRAMUSCULAR; SUBCUTANEOUS
Status: DISPENSED
Start: 2024-03-20